# Patient Record
Sex: MALE | Race: WHITE | NOT HISPANIC OR LATINO | Employment: FULL TIME | URBAN - METROPOLITAN AREA
[De-identification: names, ages, dates, MRNs, and addresses within clinical notes are randomized per-mention and may not be internally consistent; named-entity substitution may affect disease eponyms.]

---

## 2017-01-02 DIAGNOSIS — M10.9 PODAGRA: Primary | ICD-10-CM

## 2017-01-02 RX ORDER — ALLOPURINOL 300 MG/1
300 TABLET ORAL DAILY
Qty: 135 TABLET | Refills: 0 | Status: SHIPPED | OUTPATIENT
Start: 2017-01-02 | End: 2017-03-28

## 2017-01-02 NOTE — TELEPHONE ENCOUNTER
Routing refill request to provider for review/approval because:  Labs not current:  Uric, cr, CBC, CMP.  Please sign if ok. Labs are ordered as future.  Please help patient schedule a lab only visit for fasting labs. (TC)  Basia Barroso, ADOLFO  Triage Nurse

## 2017-01-02 NOTE — TELEPHONE ENCOUNTER
ALLOPURINOL 300MG       Last Written Prescription Date: 8/21/2015  Last Fill Quantity: 135, # refills: 3  Last Office Visit with [facility] [place], [facility] or [facility] prescribing provider:  11/7/2016        URIC ACID   Date Value Ref Range Status   12/19/2015 3.9 3.5 - 7.2 mg/dL Final   ]  CREATININE   Date Value Ref Range Status   12/19/2015 0.93 0.66 - 1.25 mg/dL Final   ]  WBC      8.4   7/13/2015  RBC     4.72   7/13/2015  HGB     11.8   6/17/2016  HCT     41.8   7/13/2015  No components found with this name: mct  MCV       89   7/13/2015  MCH     31.6   7/13/2015  MCHC     35.6   7/13/2015  RDW     12.7   7/13/2015  PLT      211   7/13/2015  AST       27   12/19/2015  ALT       67   12/19/2015

## 2017-01-02 NOTE — TELEPHONE ENCOUNTER
rx sent. Due for fasting labs as below. Please let know.    Gali Del Valle MD  Internal Medicine/Pediatrics

## 2017-01-17 DIAGNOSIS — M10.9 PODAGRA: ICD-10-CM

## 2017-01-17 LAB
ALBUMIN SERPL-MCNC: 4.4 G/DL (ref 3.4–5)
ALP SERPL-CCNC: 31 U/L (ref 40–150)
ALT SERPL W P-5'-P-CCNC: 104 U/L (ref 0–70)
ANION GAP SERPL CALCULATED.3IONS-SCNC: 5 MMOL/L (ref 3–14)
AST SERPL W P-5'-P-CCNC: 60 U/L (ref 0–45)
BILIRUB SERPL-MCNC: 0.4 MG/DL (ref 0.2–1.3)
BUN SERPL-MCNC: 17 MG/DL (ref 7–30)
CALCIUM SERPL-MCNC: 9.5 MG/DL (ref 8.5–10.1)
CHLORIDE SERPL-SCNC: 106 MMOL/L (ref 94–109)
CHOLEST SERPL-MCNC: 169 MG/DL
CO2 SERPL-SCNC: 30 MMOL/L (ref 20–32)
CREAT SERPL-MCNC: 1.08 MG/DL (ref 0.66–1.25)
ERYTHROCYTE [DISTWIDTH] IN BLOOD BY AUTOMATED COUNT: 12.6 % (ref 10–15)
GFR SERPL CREATININE-BSD FRML MDRD: 76 ML/MIN/1.7M2
GLUCOSE SERPL-MCNC: 107 MG/DL (ref 70–99)
HCT VFR BLD AUTO: 39.9 % (ref 40–53)
HDLC SERPL-MCNC: 29 MG/DL
HGB BLD-MCNC: 14 G/DL (ref 13.3–17.7)
LDLC SERPL CALC-MCNC: 95 MG/DL
MCH RBC QN AUTO: 31.5 PG (ref 26.5–33)
MCHC RBC AUTO-ENTMCNC: 35.1 G/DL (ref 31.5–36.5)
MCV RBC AUTO: 90 FL (ref 78–100)
NONHDLC SERPL-MCNC: 140 MG/DL
PLATELET # BLD AUTO: 236 10E9/L (ref 150–450)
POTASSIUM SERPL-SCNC: 4.1 MMOL/L (ref 3.4–5.3)
PROT SERPL-MCNC: 8.2 G/DL (ref 6.8–8.8)
RBC # BLD AUTO: 4.45 10E12/L (ref 4.4–5.9)
SODIUM SERPL-SCNC: 141 MMOL/L (ref 133–144)
TRIGL SERPL-MCNC: 225 MG/DL
URATE SERPL-MCNC: 4.7 MG/DL (ref 3.5–7.2)
WBC # BLD AUTO: 7.1 10E9/L (ref 4–11)

## 2017-01-17 PROCEDURE — 80053 COMPREHEN METABOLIC PANEL: CPT | Performed by: INTERNAL MEDICINE

## 2017-01-17 PROCEDURE — 85027 COMPLETE CBC AUTOMATED: CPT | Performed by: INTERNAL MEDICINE

## 2017-01-17 PROCEDURE — 36415 COLL VENOUS BLD VENIPUNCTURE: CPT | Performed by: INTERNAL MEDICINE

## 2017-01-17 PROCEDURE — 80061 LIPID PANEL: CPT | Performed by: INTERNAL MEDICINE

## 2017-01-17 PROCEDURE — 84550 ASSAY OF BLOOD/URIC ACID: CPT | Performed by: INTERNAL MEDICINE

## 2017-02-20 ENCOUNTER — OFFICE VISIT (OUTPATIENT)
Dept: PEDIATRICS | Facility: CLINIC | Age: 37
End: 2017-02-20
Payer: COMMERCIAL

## 2017-02-20 VITALS
HEART RATE: 62 BPM | WEIGHT: 260 LBS | HEIGHT: 72 IN | TEMPERATURE: 97.4 F | BODY MASS INDEX: 35.21 KG/M2 | DIASTOLIC BLOOD PRESSURE: 80 MMHG | OXYGEN SATURATION: 96 % | SYSTOLIC BLOOD PRESSURE: 108 MMHG

## 2017-02-20 DIAGNOSIS — J02.9 ACUTE PHARYNGITIS, UNSPECIFIED ETIOLOGY: Primary | ICD-10-CM

## 2017-02-20 LAB
DEPRECATED S PYO AG THROAT QL EIA: NORMAL
MICRO REPORT STATUS: NORMAL
SPECIMEN SOURCE: NORMAL

## 2017-02-20 PROCEDURE — 87081 CULTURE SCREEN ONLY: CPT | Performed by: INTERNAL MEDICINE

## 2017-02-20 PROCEDURE — 87880 STREP A ASSAY W/OPTIC: CPT | Performed by: INTERNAL MEDICINE

## 2017-02-20 PROCEDURE — 99213 OFFICE O/P EST LOW 20 MIN: CPT | Performed by: INTERNAL MEDICINE

## 2017-02-20 NOTE — MR AVS SNAPSHOT
After Visit Summary   2/20/2017    Denny Herrera    MRN: [id number]           Patient Information     Date Of Birth          1980        Visit Information        Provider Department      2/20/2017 12:20 PM Gali Del Valle MD [facility]        Today's Diagnoses     Acute pharyngitis    -  1      Care Instructions    1. Strep positive  2. Azithromycin 2 pills today, then 1 pill once a day for next 4 days.  3. Change out toothbrush after 48 hours  4. Contagious until on antibiotics for 24 hours        Follow-ups after your visit        Who to contact     If you have questions or need follow up information about today's clinic visit or your schedule please contact [facility] directly at [phone].  Normal or non-critical lab and imaging results will be communicated to you by Zanghart, letter or phone within 4 business days after the clinic has received the results. If you do not hear from us within 7 days, please contact the clinic through [employer] or phone. If you have a critical or abnormal lab result, we will notify you by phone as soon as possible.  Submit refill requests through [employer] or call your pharmacy and they will forward the refill request to us. Please allow 3 business days for your refill to be completed.          Additional Information About Your Visit        MyChart Information     [employer] gives you secure access to your electronic health record. If you see a primary care provider, you can also send messages to your care team and make appointments. If you have questions, please call your primary care clinic.  If you do not have a primary care provider, please call [phone] and they will assist you.        Care EveryWhere ID     This is your Care EveryWhere ID. This could be used by other organizations to access your [place] medical records  [id number]        Your Vitals Were     Pulse Temperature Height Pulse Oximetry BMI (Body Mass Index)        62 97.4  F (36.3  C) (Tympanic) 6' (1.829 m) 96% 35.26 kg/m2        Blood Pressure from Last 3 Encounters:   02/20/17 108/80   11/07/16 143/88   06/17/16 139/85    Weight from Last 3 Encounters:   02/20/17 260 lb (117.9 kg)   11/07/16 263 lb 14.4 oz (119.7 kg)   06/15/16 258 lb 2.5 oz (117.1 kg)              We Performed the Following     Beta strep group A culture     Strep, Rapid Screen        Primary Care Provider Office Phone # Fax #    Gali Del Valle [phone]       [facility] [address] [place] [address] [address] [place] [address]        Thank you!     Thank you for choosing [facility]  for your care. Our goal is always to provide you with excellent care. Hearing back from our patients is one way we can continue to improve our services. Please take a few minutes to complete the written survey that you may receive in the mail after your visit with us. Thank you!             Your Updated Medication List - Protect others around you: Learn how to safely use, store and throw away your medicines at www.disposemymeds.org.          This list is accurate as of: 2/20/17  1:30 PM.  Always use your most recent med list.                   Brand Name Dispense Instructions for use    albuterol 108 (90 BASE) MCG/ACT Inhaler    PROAIR HFA/PROVENTIL HFA/VENTOLIN HFA    1 Inhaler    Inhale 2 puffs into the lungs every 4 hours as needed for shortness of breath / dyspnea or wheezing       allopurinol 300 MG tablet    ZYLOPRIM    135 tablet    Take 1 tablet (300 mg) by mouth daily And 1/2 tablet (150 mg) by mouth in evening       fenofibrate 160 MG tablet     90 tablet    Take 1 tablet (160 mg) by mouth daily       MIRALAX powder   Generic drug:  polyethylene glycol     510 g    Take 17 g (1 capful) by mouth daily       Multi-vitamin Tabs tablet      Take 1 tablet by mouth daily       OMEGA-3 FISH OIL PO      Take 1 capsule by mouth daily       VITAMIN D (CHOLECALCIFEROL) PO      Take by mouth  daily

## 2017-02-20 NOTE — NURSING NOTE
Chief Complaint   Patient presents with     URI       Initial /80 (BP Location: Right arm, Patient Position: Chair, Cuff Size: Adult Large)  Pulse 62  Temp 97.4  F (36.3  C) (Tympanic)  Ht 6' (1.829 m)  Wt 260 lb (117.9 kg)  SpO2 96%  BMI 35.26 kg/m2 Estimated body mass index is 35.26 kg/(m^2) as calculated from the following:    Height as of this encounter: 6' (1.829 m).    Weight as of this encounter: 260 lb (117.9 kg).  Medication Reconciliation: complete   Marge Osullivan LPN

## 2017-02-20 NOTE — PROGRESS NOTES
SUBJECTIVE:                                                    Denny Herrera is a 36 year old male who presents to clinic today for the following health issues:      RESPIRATORY SYMPTOMS      Duration: 1 day    Description  sore throat    Severity: moderate    Accompanying signs and symptoms: None    History (predisposing factors):  strep exposure    Precipitating or alleviating factors: None    Therapies tried and outcome:  None    Pt here with son who was diagnosed with strep. Patient developed scratchy throat this am, a little sore. Feels like getting sick. No cough or fevers.      -------------------------------------    Problem list and histories reviewed & adjusted, as indicated.  Additional history: as documented    ROS:  Constitutional, HEENT, cardiovascular, pulmonary, gi and gu systems are negative, except as otherwise noted.    Problem list, Medication list, Allergies, and Medical/Social/Surgical histories reviewed in EPIC and updated as appropriate.    OBJECTIVE:                                                    /80 (BP Location: Right arm, Patient Position: Chair, Cuff Size: Adult Large)  Pulse 62  Temp 97.4  F (36.3  C) (Tympanic)  Ht 6' (1.829 m)  Wt 260 lb (117.9 kg)  SpO2 96%  BMI 35.26 kg/m2   Body mass index is 35.26 kg/(m^2).  General Appearance: healthy, alert and no distress  Eyes:   no discharge, erythema.  Normal pupils.  Nose: no discharge and normal mucosa  Oropharynx: mild erythema of posterior oropharnyx  Respiratory: lungs clear to auscultation - no rales, rhonchi or wheezes.  Cardiovascular: regular rate and rhythm, normal S1 S2, no S3 or S4 and no murmur, click or rub.  No peripheral edema.  Skin: no rashes or lesions.  Well perfused and normal turgor.    Diagnostic Test Results:  Results for orders placed or performed in visit on 02/20/17   Strep, Rapid Screen   Result Value Ref Range    Specimen Description Throat     Rapid Strep A Screen       NEGATIVE: No Group A  streptococcal antigen detected by immunoassay, await   culture report.      Micro Report Status FINAL 02/20/2017    Beta strep group A culture   Result Value Ref Range    Specimen Description Throat     Culture Micro No Beta Streptococcus isolated     Micro Report Status FINAL 02/22/2017         ASSESSMENT/PLAN:                                                      (J02.9) Acute pharyngitis  (primary encounter diagnosis)  Comment: strep negative.   Plan: Strep, Rapid Screen, Beta strep group A         Culture  - may have been too early to detect  - will call if continues to have sore throat and develops fevers. Would tx over the phone with PCN given known exposure    Follow up with Provider - annual visit and prn     [facility] MANNY

## 2017-02-22 LAB
BACTERIA SPEC CULT: NORMAL
MICRO REPORT STATUS: NORMAL
SPECIMEN SOURCE: NORMAL

## 2017-03-28 ENCOUNTER — MYC MEDICAL ADVICE (OUTPATIENT)
Dept: PEDIATRICS | Facility: CLINIC | Age: 37
End: 2017-03-28

## 2017-03-28 DIAGNOSIS — E78.1 HYPERTRIGLYCERIDEMIA: ICD-10-CM

## 2017-03-28 DIAGNOSIS — Z30.09 VISIT FOR VASECTOMY EVALUATION: Primary | ICD-10-CM

## 2017-03-28 DIAGNOSIS — M10.9 PODAGRA: ICD-10-CM

## 2017-03-28 RX ORDER — ALLOPURINOL 300 MG/1
300 TABLET ORAL DAILY
Qty: 135 TABLET | Refills: 2 | Status: SHIPPED | OUTPATIENT
Start: 2017-03-28 | End: 2018-04-22

## 2017-03-28 RX ORDER — FENOFIBRATE 160 MG/1
160 TABLET ORAL DAILY
Qty: 90 TABLET | Refills: 2 | Status: SHIPPED | OUTPATIENT
Start: 2017-03-28 | End: 2018-04-22

## 2017-03-28 NOTE — TELEPHONE ENCOUNTER
Fenofibrate 160 mg       Last Written Prescription Date: 02/29/16  Last Fill Quantity: 90, # refills: 1    Last Office Visit with [facility] [place], [facility] or [facility] prescribing provider:  02/20/17-for acute visit   Future Office Visit:      Cholesterol   Date Value Ref Range Status   01/17/2017 169 <200 mg/dL Final     HDL Cholesterol   Date Value Ref Range Status   01/17/2017 29 (L) >39 mg/dL Final     LDL Cholesterol Calculated   Date Value Ref Range Status   01/17/2017 95 <100 mg/dL Final     Comment:     Desirable:       <100 mg/dl     Triglycerides   Date Value Ref Range Status   01/17/2017 225 (H) <150 mg/dL Final     Comment:     Borderline high:  150-199 mg/dl   High:             200-499 mg/dl   Very high:       >499 mg/dl       Cholesterol/HDL Ratio   Date Value Ref Range Status   06/13/2014 6.1 (H) 0.0 - 5.0 Final     ALT   Date Value Ref Range Status   01/17/2017 104 (H) 0 - 70 U/L Final      Routing refill request to provider for review/approval because:  Labs out of range:  ALT elevated.    2. Allopurinol  300 mg      Last Written Prescription Date: 01/02/17  Last Fill Quantity: 135, # refills: 0  Last Office Visit with [facility] [place] [facility], [facility] or [facility] prescribing provider:  02/20/17        Uric Acid   Date Value Ref Range Status   01/17/2017 4.7 3.5 - 7.2 mg/dL Final   ]  Creatinine   Date Value Ref Range Status   01/17/2017 1.08 0.66 - 1.25 mg/dL Final   ]  Lab Results   Component Value Date    WBC 7.1 01/17/2017     Lab Results   Component Value Date    RBC 4.45 01/17/2017     Lab Results   Component Value Date    HGB 14.0 01/17/2017     Lab Results   Component Value Date    HCT 39.9 01/17/2017     No components found for: MCT  Lab Results   Component Value Date    MCV 90 01/17/2017     Lab Results   Component Value Date    MCH 31.5 01/17/2017     Lab Results   Component Value Date    MCHC 35.1 01/17/2017     Lab Results   Component Value Date    RDW 12.6 01/17/2017     Lab Results   Component Value Date     01/17/2017      Lab Results   Component Value Date    AST 60 01/17/2017     Lab Results   Component Value Date     01/17/2017     Routing refill request to provider for review/approval because:  Labs out of range:  Elevated ALT/AST  ABDON Low RN

## 2017-05-08 ENCOUNTER — OFFICE VISIT (OUTPATIENT)
Dept: UROLOGY | Facility: CLINIC | Age: 37
End: 2017-05-08
Payer: COMMERCIAL

## 2017-05-08 VITALS — WEIGHT: 260 LBS | HEIGHT: 72 IN | HEART RATE: 72 BPM | OXYGEN SATURATION: 95 % | BODY MASS INDEX: 35.21 KG/M2

## 2017-05-08 DIAGNOSIS — Z30.2 ENCOUNTER FOR STERILIZATION: Primary | ICD-10-CM

## 2017-05-08 PROCEDURE — 99203 OFFICE O/P NEW LOW 30 MIN: CPT | Performed by: UROLOGY

## 2017-05-08 ASSESSMENT — PAIN SCALES - GENERAL: PAINLEVEL: MILD PAIN (2)

## 2017-05-08 NOTE — MR AVS SNAPSHOT
After Visit Summary   5/8/2017    Denny Herrera    MRN: [id number]           Patient Information     Date Of Birth          1980        Visit Information        Provider Department      5/8/2017 3:00 PM Alden Albarran MD [facility] [place]        Today's Diagnoses     Encounter for sterilization    -  1      Care Instructions    [facility]  Vasectomy Information  [phone]    DATE OF PROCEDURE ___________________________________    TIME OF PROCEDURE ___________________________    TIME TO REPORT FOR PROCEDURE _______________________________    Your Physician:  _____ Olivier Kaplan M.D.     _____ French Garcia M.D.     _____ Alden Albarran M.D.    LOCATION OF PROCEDURE:     _____ [facility]  _____ [address]   [address] [facility]  [address]    [place]   [address]    [place]   [address]    Preoperative Instructions:    _____ You may have breakfast on the morning of your procedure.  If your procedure is    in the afternoon, you may have lunch as well.    _____ You must have someone drive you home after the procedure if you have been    prescribed an oral sedative (valium).    _____ Do not take any aspirin, blood-thinning or anti-inflammatory medication for at    least 7-10 days before the procedure (this includes but is not limited to Advil,   Aleve, Ecotrin and Bufferin).      Postoperative Instructions Follow Vasectomy    Under routine circumstances, please note the following:    -No heavy lifting (over 15 lbs) for 48 hour.  -You may shower after 48 hours.  You may have a tub bath or use a swimming pool after one week postoperatively.  Your doctor will advise you if he feels it is helpful to soak in a bathtub postoperatively.  -Do not engage in intercourse for at least ten days and then proceed when comfortable.  -Wear an athletic supporter for 48 hours postoperatively or  "until any discomfort ceases.  -Your physician will instruct you regarding the use of ice in the recovery room or at home after the procedure, as necessary.  -Please remember as you resume your activity that you may experience some discomfor and/or swelling for the one to two weeks following the procedure.  If this occurs decrease activity and slightly elevate the scrotum (athletic supporter).  -As your stitches dissolve it may appear that the incision is \"gaping.\"  This is normal.    Necessary follow-up:  You do not need a follow up appointment unless you have problems after your procedure.  Please call our office at [phone] if you do.    At the time of your procedure you will be given the supplies for your post procedure follow up.  The test should be done AT LEAST THREE MONTHS AFTER your vasecomy.  During this time, be certain to maintain birth control measure!    Between the time of your procedure and the time that you have your first sperm count it is very important that you have a minimum of 30 ejaculations.  If you have any questions about this, please consult your physician.    If the sperm count that is done at three months is negative, you will be considered sterile.  Until, you are told that you are free to discontinue birth control you are considered fertile.    If your sperm counts reveal the presence of sperm, you will be advised to repeat the test until a negative result is obtained.     NOTE:  Please call our office one week after dropping off your sample for the result.  Test results will only be given to the patient.               Follow-ups after your visit        Follow-up notes from your care team     Return for Schedule vasectomy in office.      Your next 10 appointments already scheduled     Jun 02, 2017 10:30 AM CDT   Vasectomy with Alden Albarran MD, U[facility] VAS ROOM   [facility] ([facility])    [address]  [address]  [facility] [id number] [phone]              Who to contact     If you have questions or need follow up information about today's clinic visit or your schedule please contact [facility] [place] directly at [phone].  Normal or non-critical lab and imaging results will be communicated to you by MyChart, letter or phone within 4 business days after the clinic has received the results. If you do not hear from us within 7 days, please contact the clinic through MyChart or phone. If you have a critical or abnormal lab result, we will notify you by phone as soon as possible.  Submit refill requests through [employer] or call your pharmacy and they will forward the refill request to us. Please allow 3 business days for your refill to be completed.          Additional Information About Your Visit        MyChart Information     [employer] gives you secure access to your electronic health record. If you see a primary care provider, you can also send messages to your care team and make appointments. If you have questions, please call your primary care clinic.  If you do not have a primary care provider, please call [phone] and they will assist you.        Care EveryWhere ID     This is your Care EveryWhere ID. This could be used by other organizations to access your [place] medical records  [id number]        Your Vitals Were     Pulse Height Pulse Oximetry BMI (Body Mass Index)          72 1.829 m (6') 95% 35.26 kg/m2         Blood Pressure from Last 3 Encounters:   02/20/17 108/80   11/07/16 143/88   06/17/16 139/85    Weight from Last 3 Encounters:   05/08/17 117.9 kg (260 lb)   02/20/17 117.9 kg (260 lb)   11/07/16 119.7 kg (263 lb 14.4 oz)              Today, you had the following     No orders found for display       Primary Care Provider Office Phone # Fax #    Gali Del Valle [phone]       [address]  [facility] [address] [place] [address]        Thank you!     Thank you for choosing [facility] [place]  for your care. Our goal is always to provide you with excellent care. Hearing back from our patients is one way we can continue to improve our services. Please take a few minutes to complete the written survey that you may receive in the mail after your visit with us. Thank you!             Your Updated Medication List - Protect others around you: Learn how to safely use, store and throw away your medicines at www.disposemymeds.org.          This list is accurate as of: 5/8/17  3:11 PM.  Always use your most recent med list.                   Brand Name Dispense Instructions for use    albuterol 108 (90 BASE) MCG/ACT Inhaler    PROAIR HFA/PROVENTIL HFA/VENTOLIN HFA    1 Inhaler    Inhale 2 puffs into the lungs every 4 hours as needed for shortness of breath / dyspnea or wheezing       allopurinol 300 MG tablet    ZYLOPRIM    135 tablet    Take 1 tablet (300 mg) by mouth daily And 1/2 tablet (150 mg) by mouth in evening       fenofibrate 160 MG tablet     90 tablet    Take 1 tablet (160 mg) by mouth daily       MIRALAX powder   Generic drug:  polyethylene glycol     510 g    Take 17 g (1 capful) by mouth daily       Multi-vitamin Tabs tablet      Take 1 tablet by mouth daily       OMEGA-3 FISH OIL PO      Take 1 capsule by mouth daily       VITAMIN D (CHOLECALCIFEROL) PO      Take by mouth daily

## 2017-05-08 NOTE — NURSING NOTE
Chief Complaint   Patient presents with     Other     vas consult     ABDON Parker CMA  Pt last saw blood in semen back in 1999.

## 2017-05-08 NOTE — LETTER
5/8/2017       RE: Denny Herrera  [id number] [facility] [id number]     Dear Colleague,    Thank you for referring your patient, Denny Herrera, to the [facility]OLOG[facility] [place] [facility]. Please see a copy of my visit note below.    VASECTOMY CONSULTATION NOTE  DATE OF VISIT: 5/8/2017    PATIENT NAME: Denny Herrera    YOB: 1980      REASON FOR CONSULTATION: Mr. Denny Herrera is a 36 year old year old gentleman who came to the urology clinic today requesting a vasectomy. He has 3 children and he wishes to have a vasectomy for birth control.     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 2 for R knee, 3 L knee, ACL and meniscus arthroscopic procedures x 3, 2 open surgery     COLONOSCOPY N/A 8/19/2015    Procedure: COLONOSCOPY;  Surgeon: Timbo Greenberg MD;  Location:  GI     ORTHOPEDIC SURGERY       OSTEOTOMY TIBIA Right 6/15/2016    Procedure: OSTEOTOMY TIBIA;  Surgeon: Tim Valdez MD;  [facility]: [facility] OR     REMOVE  HARDWARE KNEE Right 6/15/2016    Procedure: REMOVE HARDWARE KNEE;  Surgeon: Tim Valdez MD;  [facility] OR       MEDICATIONS:   Current Outpatient Prescriptions:      allopurinol (ZYLOPRIM) 300 MG tablet, Take 1 tablet (300 mg) by mouth daily And 1/2 tablet (150 mg) by mouth in evening, Disp: 135 tablet, Rfl: 2     fenofibrate 160 MG tablet, Take 1 tablet (160 mg) by mouth daily, Disp: 90 tablet, Rfl: 2     polyethylene glycol (MIRALAX) powder, Take 17 g (1 capful) by mouth daily, Disp: 510 g, Rfl: 1     Omega-3 Fatty Acids (OMEGA-3 FISH OIL PO), Take 1 capsule by mouth daily, Disp: , Rfl:      multivitamin, therapeutic with minerals (MULTI-VITAMIN) TABS, Take 1 tablet by mouth daily, Disp: , Rfl:      VITAMIN D, CHOLECALCIFEROL, PO, Take by mouth daily, Disp: , Rfl:      albuterol (PROAIR HFA, PROVENTIL HFA, VENTOLIN HFA) 108 (90 BASE) MCG/ACT inhaler, Inhale 2 puffs into the lungs every 4 hours as needed for shortness of breath / dyspnea or wheezing (Patient not taking: Reported on 5/8/2017), Disp: 1 Inhaler, Rfl: 1    ALLERGIES:   Allergies   Allergen Reactions     No Known Drug Allergies        FAMILY HISTORY:   Family History   Problem Relation Age of Onset     Family History Negative Other      neg for RA, IBD, psoriasis, does not know much about his father side of his family, neg for gout     Arthritis Mother      back DJD     Alcohol/Drug Mother      Depression Mother      Psychotic Disorder Mother      Endocrine Disease Mother      Unknown/Adopted Mother      back issues     Arthritis Brother      back DJD     Arthritis Maternal Grandmother      back DJD     Circulatory Maternal Grandmother      Eye Disorder Maternal Grandmother      CANCER Maternal Aunt      ?type     Hypertension Brother      Circulatory Maternal Grandfather      Eye Disorder Maternal Grandfather      Hypertension Other      maternal grandparents     Alcohol/Drug Brother      Unknown/Adopted Brother      back issues      "Unknown/Adopted Other      maternal grandparents - back issues       SOCIAL HISTORY:   Social History     Social History     Marital status:      Spouse name: N/A     Number of children: 1     Years of education: N/A     Occupational History     IT work [employer]     Social History Main Topics     Smoking status: Never Smoker     Smokeless tobacco: Never Used     Alcohol use 0.0 - 1.0 oz/week     0 - 2 Standard drinks or equivalent per week      Comment: rarely     Drug use: No     Sexual activity: Yes     Partners: Female     Birth control/ protection: Pill     Other Topics Concern     Not on file     Social History Narrative       HEIGHT: 6' 0\"     WEIGHT: 260 lbs 0 oz   BP: Data Unavailable    PULSE: 72    EXAM: He is alert and oriented and well-appearing.  Examination of the scrotum reveals normal scrotal skin.  The testicles are normal to palpation bilaterally with no intratesticular lesions.  He has normally palpable vasa bilaterally.    DIAGNOSIS: Request for sterilization    PLAN: The risks of the procedure as well as expectations for recovery and outcomes were splint in detail to him.  He was counseled on the risks for bleeding infection and pain after the procedure.  He was instructed to continue to use contraception until he had proven azoospermia on a semen specimen.  This would normally be collected at least 3 months after the procedure.  He was instructed to hold all anticoagulants medications for one week prior to the procedure.  He was also instructed to shave the scrotum prior to procedure.  It was recommended that he have someone else drive him home after his vasectomy.  In light of these risks and expectations he would like to proceed.  We are scheduling a vasectomy in the office in the near future.    Alden Albarran M.D.        "

## 2017-05-08 NOTE — PROGRESS NOTES
VASECTOMY CONSULTATION NOTE  DATE OF VISIT: 5/8/2017    PATIENT NAME: Denny Herrera    YOB: 1980      REASON FOR CONSULTATION: Mr. Denny Herrera is a 36 year old year old gentleman who came to the urology clinic today requesting a vasectomy. He has 3 children and he wishes to have a vasectomy for birth control.     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 2 for R knee, 3 L knee, ACL and meniscus arthroscopic procedures x 3, 2 open surgery     COLONOSCOPY N/A 8/19/2015    Procedure: COLONOSCOPY;  Surgeon: Timbo Greenberg MD;  Location:  GI     ORTHOPEDIC SURGERY       OSTEOTOMY TIBIA Right 6/15/2016    Procedure: OSTEOTOMY TIBIA;  Surgeon: Tim Valdez MD;  [facility] OR     REMOVE HARDWARE KNEE Right 6/15/2016    Procedure: REMOVE HARDWARE KNEE;  Surgeon: Tim Valdez MD;  [facility]       MEDICATIONS:   Current Outpatient Prescriptions:      allopurinol (ZYLOPRIM) 300 MG tablet, Take 1 tablet (300 mg) by mouth daily And 1/2 tablet (150 mg) by mouth in evening, Disp: 135  tablet, Rfl: 2     fenofibrate 160 MG tablet, Take 1 tablet (160 mg) by mouth daily, Disp: 90 tablet, Rfl: 2     polyethylene glycol (MIRALAX) powder, Take 17 g (1 capful) by mouth daily, Disp: 510 g, Rfl: 1     Omega-3 Fatty Acids (OMEGA-3 FISH OIL PO), Take 1 capsule by mouth daily, Disp: , Rfl:      multivitamin, therapeutic with minerals (MULTI-VITAMIN) TABS, Take 1 tablet by mouth daily, Disp: , Rfl:      VITAMIN D, CHOLECALCIFEROL, PO, Take by mouth daily, Disp: , Rfl:      albuterol (PROAIR HFA, PROVENTIL HFA, VENTOLIN HFA) 108 (90 BASE) MCG/ACT inhaler, Inhale 2 puffs into the lungs every 4 hours as needed for shortness of breath / dyspnea or wheezing (Patient not taking: Reported on 5/8/2017), Disp: 1 Inhaler, Rfl: 1    ALLERGIES:   Allergies   Allergen Reactions     No Known Drug Allergies        FAMILY HISTORY:   Family History   Problem Relation Age of Onset     Family History Negative Other      neg for RA, IBD, psoriasis, does not know much about his father side of his family, neg for gout     Arthritis Mother      back DJD     Alcohol/Drug Mother      Depression Mother      Psychotic Disorder Mother      Endocrine Disease Mother      Unknown/Adopted Mother      back issues     Arthritis Brother      back DJD     Arthritis Maternal Grandmother      back DJD     Circulatory Maternal Grandmother      Eye Disorder Maternal Grandmother      CANCER Maternal Aunt      ?type     Hypertension Brother      Circulatory Maternal Grandfather      Eye Disorder Maternal Grandfather      Hypertension Other      maternal grandparents     Alcohol/Drug Brother      Unknown/Adopted Brother      back issues     Unknown/Adopted Other      maternal grandparents - back issues       SOCIAL HISTORY:   Social History     Social History     Marital status:      Spouse name: N/A     Number of children: 1     Years of education: N/A     Occupational History     IT work [employer]     Social History Main Topics      "Smoking status: Never Smoker     Smokeless tobacco: Never Used     Alcohol use 0.0 - 1.0 oz/week     0 - 2 Standard drinks or equivalent per week      Comment: rarely     Drug use: No     Sexual activity: Yes     Partners: Female     Birth control/ protection: Pill     Other Topics Concern     Not on file     Social History Narrative       HEIGHT: 6' 0\"     WEIGHT: 260 lbs 0 oz   BP: Data Unavailable    PULSE: 72    EXAM: He is alert and oriented and well-appearing.  Examination of the scrotum reveals normal scrotal skin.  The testicles are normal to palpation bilaterally with no intratesticular lesions.  He has normally palpable vasa bilaterally.    DIAGNOSIS: Request for sterilization    PLAN: The risks of the procedure as well as expectations for recovery and outcomes were splint in detail to him.  He was counseled on the risks for bleeding infection and pain after the procedure.  He was instructed to continue to use contraception until he had proven azoospermia on a semen specimen.  This would normally be collected at least 3 months after the procedure.  He was instructed to hold all anticoagulants medications for one week prior to the procedure.  He was also instructed to shave the scrotum prior to procedure.  It was recommended that he have someone else drive him home after his vasectomy.  In light of these risks and expectations he would like to proceed.  We are scheduling a vasectomy in the office in the near future.    Alden Albarran M.D.    "

## 2017-05-08 NOTE — PATIENT INSTRUCTIONS
[facility] UROLOGY  Vasectomy Information  [phone]    DATE OF PROCEDURE ___________________________________    TIME OF PROCEDURE ___________________________    TIME TO REPORT FOR PROCEDURE _______________________________    Your Physician:  _____ Olivier Kaplan M.D.     _____ French Garcia M.D.     _____ Alden Albarran M.D.    LOCATION OF PROCEDURE:     _____ [facility]  _____ [address]   [address]   [address] [facility]  [address]    [place]   [address]    [place]   [address]    Preoperative Instructions:    _____ You may have breakfast on the morning of your procedure.  If your procedure is    in the afternoon, you may have lunch as well.    _____ You must have someone drive you home after the procedure if you have been    prescribed an oral sedative (valium).    _____ Do not take any aspirin, blood-thinning or anti-inflammatory medication for at    least 7-10 days before the procedure (this includes but is not limited to Advil,   Aleve, Ecotrin and Bufferin).      Postoperative Instructions Follow Vasectomy    Under routine circumstances, please note the following:    -No heavy lifting (over 15 lbs) for 48 hour.  -You may shower after 48 hours.  You may have a tub bath or use a swimming pool after one week postoperatively.  Your doctor will advise you if he feels it is helpful to soak in a bathtub postoperatively.  -Do not engage in intercourse for at least ten days and then proceed when comfortable.  -Wear an athletic supporter for 48 hours postoperatively or until any discomfort ceases.  -Your physician will instruct you regarding the use of ice in the recovery room or at home after the procedure, as necessary.  -Please remember as you resume your activity that you may experience some discomfor and/or swelling for the one to two weeks following the procedure.  If this occurs decrease activity and slightly elevate the scrotum (athletic  "supporter).  -As your stitches dissolve it may appear that the incision is \"gaping.\"  This is normal.    Necessary follow-up:  You do not need a follow up appointment unless you have problems after your procedure.  Please call our office at [phone] if you do.    At the time of your procedure you will be given the supplies for your post procedure follow up.  The test should be done AT LEAST THREE MONTHS AFTER your vasecomy.  During this time, be certain to maintain birth control measure!    Between the time of your procedure and the time that you have your first sperm count it is very important that you have a minimum of 30 ejaculations.  If you have any questions about this, please consult your physician.    If the sperm count that is done at three months is negative, you will be considered sterile.  Until, you are told that you are free to discontinue birth control you are considered fertile.    If your sperm counts reveal the presence of sperm, you will be advised to repeat the test until a negative result is obtained.     NOTE:  Please call our office one week after dropping off your sample for the result.  Test results will only be given to the patient.         "

## 2017-05-30 DIAGNOSIS — Z30.2 ENCOUNTER FOR STERILIZATION: Primary | ICD-10-CM

## 2017-06-02 ENCOUNTER — OFFICE VISIT (OUTPATIENT)
Dept: UROLOGY | Facility: CLINIC | Age: 37
End: 2017-06-02
Payer: COMMERCIAL

## 2017-06-02 VITALS — HEART RATE: 68 BPM | OXYGEN SATURATION: 97 %

## 2017-06-02 DIAGNOSIS — Z30.2 ENCOUNTER FOR STERILIZATION: Primary | ICD-10-CM

## 2017-06-02 PROCEDURE — 88302 TISSUE EXAM BY PATHOLOGIST: CPT | Performed by: UROLOGY

## 2017-06-02 PROCEDURE — 55250 REMOVAL OF SPERM DUCT(S): CPT | Performed by: UROLOGY

## 2017-06-02 RX ORDER — HYDROCODONE BITARTRATE AND ACETAMINOPHEN 5; 325 MG/1; MG/1
1 TABLET ORAL EVERY 4 HOURS PRN
Qty: 10 TABLET | Refills: 0 | Status: SHIPPED | OUTPATIENT
Start: 2017-06-02 | End: 2017-07-27

## 2017-06-02 ASSESSMENT — PAIN SCALES - GENERAL
PAINLEVEL: NO PAIN (0)
PAINLEVEL: NO PAIN (0)

## 2017-06-02 NOTE — NURSING NOTE
Prior to the start of the procedure and with procedural staff participation, I verbally confirmed the patient s identity using two indicators, relevant allergies, that the procedure was appropriate and matched the consent or emergent situation, and that the correct equipment/implants were available. Immediately prior to starting the procedure I conducted the Time Out with the procedural staff and re-confirmed the patient s name, procedure, and site/side. (The Joint Commission universal protocol was followed.)  Yes    Sedation (Moderate or Deep): None  Pt has signed the consent form today confirming that a VASECTOMY is the correct procedure today. I verbally confirmed the patient s identity using two indicators, that the pt has started any medication as prescribed for this procedure, relevant allergies, that they have not used blood thinning products in the last 7 - 10 days, and that the correct equipment was available. Immediately prior to starting the procedure I conducted the Time Out with the MD and re-confirmed the patient s name and procedure. Pathology ordered and sent to lab. Post-procedure information, also specimen collection cup with instructions, given to pt at time of check out.    ABDON Parker CMA

## 2017-06-02 NOTE — NURSING NOTE
The following medication was given:     MEDICATION:  8.4% sodium bicarbonate  ROUTE: inject  SITE: scrotum  DOSE: 50 mEq  LOT #: -ev  : Libby  EXPIRATION DATE: 1aug2017  NDC#: 6955-5919-29   Was there drug waste? Yes  Amount of drug waste (mL): 45.  Reason for waste:  Single use vial      Matt Parker CMA  June 2, 2017

## 2017-06-02 NOTE — PATIENT INSTRUCTIONS
POST VASECTOMY INSTRUCTIONS    1.) If you have any concerns or questions, please contact our office at [phone].     2.) It is okay to take a shower, however, do not soak in water (bath,swimming, hot tub,etc....) until your incision is healed.    3.) You might notice some swelling, mild bruising, and discomfort for several days after your vasectomy. This is to be expected. For at least the next 24 hours, an ice pack should be applied for 20 minutes every hour that you are awake. Ice will help with discomfort and swelling. Do not place directly on the skin.    4.) No intercourse, strenuous activity or exercise for at least 7-10 days, even if you feel fine.    5.) You need to wear good scrotal support while you are healing. We strongly recommend an athletic supporter or a pair of regular briefs that are one size too small. Boxer briefs do not offer enough support.    6.) Tylenol as directed on the bottle is preferred for discomfort. Please avoid any blood thinning products such as ibuprofen and aspirin (Motrin, Advil, Excedrin, Aleve, ect..) for at least the next week.    7.) It is normal to have mild drainage from the incision area for several days. However, please contact our office if you notice: bright red blood that does not stop after three days, increased pain, heat at the incision, red streaks, foul smelling discharge, or if you start to run a fever.     8.) YOU MUST CONTINUE BIRTH CONTROL UNTIL WE CONFIRM YOUR STERILITY.  This process can take up to a year to complete (rare occurrence).     9.) You have been given a form with specimen cup and instructions for your follow up specimen. You will be cleared once we receive ONE negative specimen. If your specimen comes back positive (sperm seen) you will be asked to repeat the test. This does not mean that your vasectomy has failed.

## 2017-06-02 NOTE — PROGRESS NOTES
OFFICE VASECTOMY OPERATIVE NOTE    DATE: 06/02/17  PATIENT: Denny Herrera    YOB: 1980    Denny Herrera is a 36 year old male.  He has 3 children and he wishes a vasectomy for birth control.  He has read the brochure and he has shaved himself.  I reviewed the vasectomy procedure with him explaining that it would be done with a local anesthetic given just in the location where the vasectomy would be done.  It would be done through scalpel-less incisions with the removal of segments of the vasa, cauterization of the ends, and burying the ends separate with sutures.      Pt. Understands:  1/1000-1/3000 risk of future pregnancy even with perfectly done vasectomy  -vasectomy is a permanent procedure    -he may cryopreserve sperm if he wishes   -1-5% risk of post-vasectomy pain syndrome   -1-5% risk of complication, primarily infection or bleeding  - he needs to have a semen sample that shows no sperm before getting approval for unprotected intercourse.      Complications such as bleeding, infection, and damage to other tissues in the area were discussed.  I recommended that an ice bag be placed on the scrotum off and on tonight to help reduce pain and swelling.      He was reminded that he was not sterile immediately after the vasectomy that it would take at least 20 ejaculations to empty the vas of any remaining sperm.  He was not to provide a semen sample until after the 20th ejaculation and not before 12 weeks after the vas. He was  to fulfill both of those requirements.   He understands it is his responsibility to find out the results of the vas before proceeding with intercourse without birth control protection.  Other items discussed were activity afterwards, returning to work, voluntary physical activity,  resuming sexual activity, clothing to wear, bathing, and care of the vas site and expected changes in the site as healing progresses.  After signing the permit, bilateral vasectomy was done as  described through scalpel-less incisions.       ANESTHESIA: Local    DETAILS OF PROCEDURE: The risks of the procedure were explained in detail to the patient and informed consent was obtained. The patient was placed supine on the procedure table and the penis and scrotum were prepped and draped in the standard sterile fashion. The right vas deferens was isolated and brought up to the median raphe of the scrotum. 1% lidocaine local anesthesia was used to infiltrate the skin and the spermatic cord. A sharp hemostat was used to make a skin puncture. Adventitial tissues were swept away from the vas. A 1 cm segment of the vas was excised and sent for pathology. The proximal and distal lumina of the vas were cauterized and then each segment was tied off in a knuckling-fashion with a 3-0 chromic suture. Hemostasis was ensured and the segments were released back into the scrotum. Next the left vas was brought up to the same incision and a vasectomy was performed in the similar fashion. At the end of the procedure a single 3-0 chromic suture was placed in the skin.     COMPLICATIONS: None    TAKE HOME MEDICATIONS: Vicodin, 1-2 tabs every 6 hours, PRN    DISMISSAL INSTRUCTIONS:  - Ice pack to scrotum 15 to 20 minutes each hour awake for 36 to 40 hours.  - No strenuous activity or ejaculation for 14 days.  - No unprotected sexual activity until proven azoospermia on semen samples at 3 months.  - Referred to patient handout for normal postop expectations and indications to contact nurse or physician.    M.D.: Alden Albarran M.D.

## 2017-06-02 NOTE — LETTER
6/2/2017       RE: Denny Herrera  [address] [facility] [address]     Dear Colleague,    Thank you for referring your patient, Denny Herrera, to the [facility] [place] at [facility]. Please see a copy of my visit note below.    OFFICE VASECTOMY OPERATIVE NOTE    DATE: 06/02/17  PATIENT: Denny Herrera    YOB: 1980    Denny Herrera is a 36 year old male.  He has 3 children and he wishes a vasectomy for birth control.  He has read the brochure and he has shaved himself.  I reviewed the vasectomy procedure with him explaining that it would be done with a local anesthetic given just in the location where the vasectomy would be done.  It would be done through scalpel-less incisions with the removal of segments of the vasa, cauterization of the ends, and burying the ends separate with sutures.      Pt. Understands:  1/1000-1/3000 risk of future pregnancy even with perfectly done vasectomy  -vasectomy is a permanent procedure    -he may cryopreserve sperm if he wishes   -1-5% risk of post-vasectomy pain syndrome   -1-5% risk of complication, primarily infection or bleeding  - he needs to have a semen sample that shows no sperm before getting approval for unprotected intercourse.      Complications such as bleeding, infection, and damage to other tissues in the area were discussed.  I recommended that an ice bag be placed on the scrotum off and on tonight to help reduce pain and swelling.      He was reminded that he was not sterile immediately after the vasectomy that it would take at least 20 ejaculations to empty the vas of any remaining sperm.  He was not to provide a semen sample until after the 20th ejaculation and not before 12 weeks after the vas. He was  to fulfill both of those requirements.   He understands it is his responsibility to find out the results of the vas before proceeding with intercourse without birth control  protection.  Other items discussed were activity afterwards, returning to work, voluntary physical activity,  resuming sexual activity, clothing to wear, bathing, and care of the vas site and expected changes in the site as healing progresses.  After signing the permit, bilateral vasectomy was done as described through scalpel-less incisions.     ANESTHESIA: Local    DETAILS OF PROCEDURE: The risks of the procedure were explained in detail to the patient and informed consent was obtained. The patient was placed supine on the procedure table and the penis and scrotum were prepped and draped in the standard sterile fashion. The right vas deferens was isolated and brought up to the median raphe of the scrotum. 1% lidocaine local anesthesia was used to infiltrate the skin and the spermatic cord. A sharp hemostat was used to make a skin puncture. Adventitial tissues were swept away from the vas. A 1 cm segment of the vas was excised and sent for pathology. The proximal and distal lumina of the vas were cauterized and then each segment was tied off in a knuckling-fashion with a 3-0 chromic suture. Hemostasis was ensured and the segments were released back into the scrotum. Next the left vas was brought up to the same incision and a vasectomy was performed in the similar fashion. At the end of the procedure a single 3-0 chromic suture was placed in the skin.     COMPLICATIONS: None    TAKE HOME MEDICATIONS: Vicodin, 1-2 tabs every 6 hours, PRN    DISMISSAL INSTRUCTIONS:  - Ice pack to scrotum 15 to 20 minutes each hour awake for 36 to 40 hours.  - No strenuous activity or ejaculation for 14 days.  - No unprotected sexual activity until proven azoospermia on semen samples at 3 months.  - Referred to patient handout for normal postop expectations and indications to contact nurse or physician.    M.D.: Alden Albarran M.D.

## 2017-06-02 NOTE — MR AVS SNAPSHOT
After Visit Summary   6/2/2017    Denny Herrera    MRN: [id number]           Patient Information     Date Of Birth          1980        Visit Information        Provider Department      6/2/2017 10:30 AM Alden Albarran MD; UB VAS ROOM [facility] [place]        Today's Diagnoses     Encounter for sterilization    -  1      Care Instructions    POST VASECTOMY INSTRUCTIONS    1.) If you have any concerns or questions, please contact our office at [phone].     2.) It is okay to take a shower, however, do not soak in water (bath,swimming, hot tub,etc....) until your incision is healed.    3.) You might notice some swelling, mild bruising, and discomfort for several days after your vasectomy. This is to be expected. For at least the next 24 hours, an ice pack should be applied for 20 minutes every hour that you are awake. Ice will help with discomfort and swelling. Do not place directly on the skin.    4.) No intercourse, strenuous activity or exercise for at least 7-10 days, even if you feel fine.    5.) You need to wear good scrotal support while you are healing. We strongly recommend an athletic supporter or a pair of regular briefs that are one size too small. Boxer briefs do not offer enough support.    6.) Tylenol as directed on the bottle is preferred for discomfort. Please avoid any blood thinning products such as ibuprofen and aspirin (Motrin, Advil, Excedrin, Aleve, ect..) for at least the next week.    7.) It is normal to have mild drainage from the incision area for several days. However, please contact our office if you notice: bright red blood that does not stop after three days, increased pain, heat at the incision, red streaks, foul smelling discharge, or if you start to run a fever.     8.) YOU MUST CONTINUE BIRTH CONTROL UNTIL WE CONFIRM YOUR STERILITY.  This process can take up to a year to complete (rare occurrence).     9.) You have  been given a form with specimen cup and instructions for your follow up specimen. You will be cleared once we receive ONE negative specimen. If your specimen comes back positive (sperm seen) you will be asked to repeat the test. This does not mean that your vasectomy has failed.             Follow-ups after your visit        Who to contact     If you have questions or need follow up information about today's clinic visit or your schedule please contact [facility] [place] directly at [phone].  Normal or non-critical lab and imaging results will be communicated to you by my[employer]hart, letter or phone within 4 business days after the clinic has received the results. If you do not hear from us within 7 days, please contact the clinic through [employer] or phone. If you have a critical or abnormal lab result, we will notify you by phone as soon as possible.  Submit refill requests through [employer] or call your pharmacy and they will forward the refill request to us. Please allow 3 business days for your refill to be completed.          Additional Information About Your Visit        my[employer]us[employer]J-Kan Information     [employer] gives you secure access to your electronic health record. If you see a primary care provider, you can also send messages to your care team and make appointments. If you have questions, please call your primary care clinic.  If you do not have a primary care provider, please call [phone] and they will assist you.        Care EveryWhere ID     This is your Care EveryWhere ID. This could be used by other organizations to access your [place] medical records  [id number]        Your Vitals Were     Pulse Pulse Oximetry                68 97%           Blood Pressure from Last 3 Encounters:   02/20/17 108/80   11/07/16 143/88   06/17/16 139/85    Weight from Last 3 Encounters:   05/08/17 117.9 kg (260 lb)   02/20/17 117.9 kg (260 lb)   11/07/16 119.7 kg (263 lb 14.4 oz)               We Performed the Following     Surgical pathology exam [[id number]]          Today's Medication Changes          These changes are accurate as of: 6/2/17 10:58 AM.  If you have any questions, ask your nurse or doctor.               Start taking these medicines.        Dose/Directions    HYDROcodone-acetaminophen 5-325 MG per tablet   Commonly known as:  NORCO   Used for:  Encounter for sterilization        Dose:  1 tablet   Take 1 tablet by mouth every 4 hours as needed for pain maximum 6 tablet(s) per day   Quantity:  10 tablet   Refills:  0            Where to get your medicines      Some of these will need a paper prescription and others can be bought over the counter.  Ask your nurse if you have questions.     Bring a paper prescription for each of these medications     HYDROcodone-acetaminophen 5-325 MG per tablet                Primary Care Provider Office Phone # Fax #    Gali Del Valle [phone]       [address] [place] [address]        Thank you!     Thank you for choosing [facility] [place]  for your care. Our goal is always to provide you with excellent care. Hearing back from our patients is one way we can continue to improve our services. Please take a few minutes to complete the written survey that you may receive in the mail after your visit with us. Thank you!             Your Updated Medication List - Protect others around you: Learn how to safely use, store and throw away your medicines at www.disposemymeds.org.          This list is accurate as of: 6/2/17 10:58 AM.  Always use your most recent med list.                   Brand Name Dispense Instructions for use    albuterol 108 (90 BASE) MCG/ACT Inhaler    PROAIR HFA/PROVENTIL HFA/VENTOLIN HFA    1 Inhaler    Inhale 2 puffs into the lungs every 4 hours as needed for shortness of breath / dyspnea or wheezing       allopurinol 300 MG tablet     ZYLOPRIM    135 tablet    Take 1 tablet (300 mg) by mouth daily And 1/2 tablet (150 mg) by mouth in evening       fenofibrate 160 MG tablet     90 tablet    Take 1 tablet (160 mg) by mouth daily       HYDROcodone-acetaminophen 5-325 MG per tablet    NORCO    10 tablet    Take 1 tablet by mouth every 4 hours as needed for pain maximum 6 tablet(s) per day       MIRALAX powder   Generic drug:  polyethylene glycol     510 g    Take 17 g (1 capful) by mouth daily       Multi-vitamin Tabs tablet      Take 1 tablet by mouth daily       OMEGA-3 FISH OIL PO      Take 1 capsule by mouth daily       VITAMIN D (CHOLECALCIFEROL) PO      Take by mouth daily

## 2017-06-06 LAB — COPATH REPORT: NORMAL

## 2017-07-17 DIAGNOSIS — M25.561 ACUTE PAIN OF RIGHT KNEE: Primary | ICD-10-CM

## 2017-07-27 ENCOUNTER — OFFICE VISIT (OUTPATIENT)
Dept: PEDIATRICS | Facility: CLINIC | Age: 37
End: 2017-07-27
Payer: COMMERCIAL

## 2017-07-27 VITALS
BODY MASS INDEX: 36.16 KG/M2 | DIASTOLIC BLOOD PRESSURE: 80 MMHG | OXYGEN SATURATION: 98 % | HEART RATE: 71 BPM | SYSTOLIC BLOOD PRESSURE: 132 MMHG | WEIGHT: 267 LBS | RESPIRATION RATE: 12 BRPM | HEIGHT: 72 IN

## 2017-07-27 DIAGNOSIS — S83.241D TEAR OF MEDIAL MENISCUS OF RIGHT KNEE, CURRENT, UNSPECIFIED TEAR TYPE, SUBSEQUENT ENCOUNTER: ICD-10-CM

## 2017-07-27 DIAGNOSIS — Z01.818 PREOP GENERAL PHYSICAL EXAM: Primary | ICD-10-CM

## 2017-07-27 PROCEDURE — 99214 OFFICE O/P EST MOD 30 MIN: CPT | Mod: GC | Performed by: PEDIATRICS

## 2017-07-27 NOTE — LETTER
[address] [address]                  [phone]   July 27, 2017        To whom it may concern:    Denny Herrera is a patient under my care. Given a recent surgery, he will need to have access to an elevator for the next couple of months. Thank you.       Please contact me if you have any questions.           Best regards,      Maria D Callejas MD  IM/Peds PGY-4

## 2017-07-27 NOTE — PATIENT INSTRUCTIONS
Thank you for coming in to clinic today. It was a pleasure to meet you. I am currently building my patient panel and would be happy to see you back in clinic. I currently see patients on Thursday mornings and afternoons.     Maria D Callejas MD  Internal Medicine-Pediatrics Resident  For appointments: [phone]      Doctor's Instructions:         Before Your Surgery      Call your surgeon if there is any change in your health. This includes signs of a cold or flu (such as a sore throat, runny nose, cough, rash or fever).    Do not smoke, drink alcohol or take over the counter medicine (unless your surgeon or primary care doctor tells you to) for the 24 hours before and after surgery.    If you take prescribed drugs: Follow your doctor s orders about which medicines to take and which to stop until after surgery.    Eating and drinking prior to surgery: follow the instructions from your surgeon    Take a shower or bath the night before surgery. Use the soap your surgeon gave you to gently clean your skin. If you do not have soap from your surgeon, use your regular soap. Do not shave or scrub the surgery site.  Wear clean pajamas and have clean sheets on your bed.

## 2017-07-27 NOTE — PROGRESS NOTES
[facility] [place]  [address]  [address]  [place] [address]  [phone]  Dept: [phone]    PRE-OP EVALUATION:  Today's date: 2017    Denny Herrera (: 1980) presents for pre-operative evaluation assessment as requested by Dr. Jared Valdez.  He requires evaluation and anesthesia risk assessment prior to undergoing surgery/procedure for treatment of right knee injury.  Proposed procedure: Right knee arthroscopy     Date of Surgery/ Procedure: 17  Time of Surgery/ Procedure: 1600  Hospital/Surgical Facility: [facility]   Fax number for surgical facility: not available  Primary Physician: Gali Del Valle  Type of Anesthesia Anticipated: General    Patient has a Health Care Directive or Living Will:  NO    Preop Questions 2017   1.  Do you have a history of heart attack, stroke, stent, bypass or surgery on an artery in the head, neck, heart or legs? No   2.  Do you ever have any pain or discomfort in your chest? No   3.  Do you have a history of  Heart Failure? No   4.   Are you troubled by shortness of breath when:  walking on a level surface, or up a slight hill, or at night? No   5.  Do you currently have a cold, bronchitis or other respiratory infection? No   6.  Do you have a cough, shortness of breath, or wheezing? No   7.  Do you sometimes get pains in the calves of your legs when you walk? No   8. Do you or anyone in your family have previous history of blood clots? No   9.  Do you or does anyone in your family have a serious bleeding problem such as prolonged bleeding following surgeries or cuts? No   10. Have you ever had problems with anemia or been told to take iron pills? No   11. Have you had any abnormal blood loss such as black, tarry or bloody stools? No   12. Have you ever had a blood transfusion? No   13. Have you or any of your relatives ever had problems with anesthesia? No   14. Do you have sleep apnea, excessive snoring or daytime  drowsiness? No   15. Do you have any prosthetic heart valves? No   16. Do you have prosthetic joints? No           HPI:                                                      Brief HPI related to upcoming procedure: 1. Near complete radial tear of the posterior body/posterior horn of the medial meniscus superimposed on meniscal degeneration. This is in the setting of prior meniscectomy. 2. 6mm transverse by 6mm AP area of full-thickness cartilage loss in the medial femoral condyle in the site of donor osteochondral allograft with underlying bony edema in the medial femoral condyle      Pre-HYPERTENSION - Not currently on any medication for HTN management. Currently denies any symptoms referable to elevated blood pressure. Specifically denies chest pain, palpitations, dyspnea, orthopnea, PND or peripheral edema. Blood pressure readings have been in 130s in clinic.                                                                                                                                       .  HYPERLIPIDEMIA - Patient has a long history of significant Hyperlipidemia requiring medication for treatment with recent good control. Patient reports no problems or side effects with the medication.                                                                                                                                                       .    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 No evidence of sclerosis on exam.       Raynaud's syndrome 03/14/2014     Priority: Medium     Hepatic steatosis 03/14/2014     Priority: Medium      Hypertriglyceridemia 02/08/2013     Priority: Medium     Obesity 02/08/2013     Priority: Medium     Elevated blood pressure reading without diagnosis of hypertension 02/23/2012     Priority: Medium     CARDIOVASCULAR SCREENING; LDL GOAL LESS THAN 160 06/20/2011     Priority: Medium      Past Medical History:   Diagnosis Date     Ankle joint pain 11/9/2012     Blood in semen      CARDIOVASCULAR SCREENING; LDL GOAL LESS THAN 160 6/20/2011     Elevated blood pressure reading without diagnosis of hypertension      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     Gout      Hepatic steatosis 3/14/2014     Hypertriglyceridaemia      Hypertriglyceridemia 2/8/2013     Obesity 2/8/2013     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 2 for R knee, 3 L knee, ACL and meniscus arthroscopic procedures x 3, 2 open surgery     COLONOSCOPY N/A 8/19/2015    Procedure: COLONOSCOPY;  Surgeon: Timbo Greenberg MD;  Location:  GI     ORTHOPEDIC SURGERY       OSTEOTOMY TIBIA Right 6/15/2016    Procedure: OSTEOTOMY TIBIA;  Surgeon: Tim Valdez MD;  Location: [facility] OR     REMOVE HARDWARE KNEE Right 6/15/2016    Procedure: REMOVE HARDWARE KNEE;  Surgeon: Tim Valdez MD;  Location: [facility] OR     Current Outpatient Prescriptions   Medication Sig Dispense Refill     allopurinol (ZYLOPRIM) 300 MG tablet Take 1 tablet (300 mg) by mouth daily And 1/2 tablet (150 mg) by mouth in evening 135 tablet 2     fenofibrate 160 MG tablet Take 1 tablet (160 mg) by mouth daily 90  tablet 2     polyethylene glycol (MIRALAX) powder Take 17 g (1 capful) by mouth daily 510 g 1     Omega-3 Fatty Acids (OMEGA-3 FISH OIL PO) Take 1 capsule by mouth daily       multivitamin, therapeutic with minerals (MULTI-VITAMIN) TABS Take 1 tablet by mouth daily       VITAMIN D, CHOLECALCIFEROL, PO Take by mouth daily       OTC products: no recent use of OTC ASA, NSAIDS or Steroids    Allergies   Allergen Reactions     No Known Drug Allergies       Latex Allergy: NO    Social History   Substance Use Topics     Smoking status: Never Smoker     Smokeless tobacco: Never Used     Alcohol use 0.0 - 1.0 oz/week     0 - 2 Standard drinks or equivalent per week      Comment: rarely     History   Drug Use No       REVIEW OF SYSTEMS:                                                    No fevers chills cough cold congestion. No chest pain, shortness of breath or dyspnea with exertion. No abdominal pain, nausea, vomiting, diarrhea, constipation. No urinary symptoms. NO joint pain outside of his right knee or skin changes.     EXAM:                                                    /80 (BP Location: Right arm, Patient Position: Chair, Cuff Size: Adult Regular)  Pulse 71  Resp 12  Ht 6' (1.829 m)  Wt 267 lb (121.1 kg)  SpO2 98%  BMI 36.21 kg/m2     Exam:  General: the patient is pleasant, resting comfortably, no acute distress.   HEENT: Normocephalic, atraumatic.  Lungs: Breathing comfortably on room air. Clear to auscultation, no wheezes or crackles.   CV: RRR, nl s1 and s2, no murmurs.   Abdomen: Soft, nondistended, nontender. No rebound or guarding. Bowel sounds active.  Extremities: No lower extremity edema. Peripheral pulses strong and symmetric.   Skin: no appreciable skin lesions/rashes on exposed skin.   Neuro: Alert and oriented x4, grossly normal neurologic exam. Antalgic gait    DIAGNOSTICS:                                                    No labs or EKG required for low risk surgery (cataract, skin  procedure, breast biopsy, etc)    Recent Labs   Lab Test  01/17/17   0714  06/17/16   0646   12/19/15   0823  07/13/15   1949   HGB  14.0  11.8*   < >   --   14.9   PLT  236   --    --    --   211   NA  141   --    --   140   --    POTASSIUM  4.1   --    --   3.7   --    CR  1.08   --    --   0.93   --     < > = values in this interval not displayed.        IMPRESSION:                                                    Reason for surgery/procedure: meniscal injury  Diagnosis/reason for consult: preop evaluation    The proposed surgical procedure is considered INTERMEDIATE risk.    REVISED CARDIAC RISK INDEX  The patient has the following serious cardiovascular risks for perioperative complications such as (MI, PE, VFib and 3  AV Block):  No serious cardiac risks  INTERPRETATION: 0 risks: Class I (very low risk - 0.4% complication rate)    The patient has the following additional risks for perioperative complications:  Obesity.      ICD-10-CM    1. Preop general physical exam Z01.818    2. Tear of medial meniscus of right knee, current, unspecified tear type, subsequent encounter S83.470D        RECOMMENDATIONS:                                                        Cardiovascular Risk  Pre-hypertension, not currently on medications.     --Patient instructed to hold all of his medications on the day of surgery and resume the following day    APPROVAL GIVEN to proceed with proposed procedure, without further diagnostic evaluation       Signed Electronically by: Maria D Callejas MD    Copy of this evaluation report is provided to requesting physician.    [facility] Preop Guidelines    ===========  STAFF NOTE:  Patient seen with resident physician today.  I was physically present during key portions of the visit and participated in the evaluation and management of the patient today.     ASSESSMENT:      ICD-10-CM    1. Preop general physical exam Z01.818    2. Tear of medial meniscus of right knee, current, unspecified tear  type, subsequent encounter S83.241I        Matt Andrews MD

## 2017-07-27 NOTE — NURSING NOTE
Chief Complaint   Patient presents with     Pre-Op Exam       Initial /80 (BP Location: Right arm, Patient Position: Chair, Cuff Size: Adult Regular)  Pulse 71  Resp 12  Ht 6' (1.829 m)  Wt 267 lb (121.1 kg)  SpO2 98%  BMI 36.21 kg/m2 Estimated body mass index is 36.21 kg/(m^2) as calculated from the following:    Height as of this encounter: 6' (1.829 m).    Weight as of this encounter: 267 lb (121.1 kg).  Medication Reconciliation: complete  Sylvia Jones, CMA

## 2017-07-27 NOTE — MR AVS SNAPSHOT
After Visit Summary   7/27/2017    Denny Herrera    MRN: [id number]           Patient Information     Date Of Birth          1980        Visit Information        Provider Department      7/27/2017 3:45 PM Maria D Callejas MD [facility]        Today's Diagnoses     Preop general physical exam    -  1      Care Instructions    Thank you for coming in to clinic today. It was a pleasure to meet you. I am currently building my patient panel and would be happy to see you back in clinic. I currently see patients on Thursday mornings and afternoons.     Maria D Callejas MD  Internal Medicine-Pediatrics Resident  For appointments: [phone]      Doctor's Instructions:         Before Your Surgery      Call your surgeon if there is any change in your health. This includes signs of a cold or flu (such as a sore throat, runny nose, cough, rash or fever).    Do not smoke, drink alcohol or take over the counter medicine (unless your surgeon or primary care doctor tells you to) for the 24 hours before and after surgery.    If you take prescribed drugs: Follow your doctor s orders about which medicines to take and which to stop until after surgery.    Eating and drinking prior to surgery: follow the instructions from your surgeon    Take a shower or bath the night before surgery. Use the soap your surgeon gave you to gently clean your skin. If you do not have soap from your surgeon, use your regular soap. Do not shave or scrub the surgery site.  Wear clean pajamas and have clean sheets on your bed.           Follow-ups after your visit        Who to contact     If you have questions or need follow up information about today's clinic visit or your schedule please contact [facility] directly at [phone].  Normal or non-critical lab and imaging results will be communicated to you by MyChart, letter or phone within 4 business days after the clinic has received the results. If you do not hear from us  within 7 days, please contact the clinic through [employer] or phone. If you have a critical or abnormal lab result, we will notify you by phone as soon as possible.  Submit refill requests through [employer] or call your pharmacy and they will forward the refill request to us. Please allow 3 business days for your refill to be completed.          Additional Information About Your Visit        [facility] Medical Information     [employer] gives you secure access to your electronic health record. If you see a primary care provider, you can also send messages to your care team and make appointments. If you have questions, please call your primary care clinic.  If you do not have a primary care provider, please call [phone] and they will assist you.        Care EveryWhere ID     This is your Care EveryWhere ID. This could be used by other organizations to access your [facility] medical records  [id number]        Your Vitals Were     Pulse Respirations Height Pulse Oximetry BMI (Body Mass Index)       71 12 6' (1.829 m) 98% 36.21 kg/m2        Blood Pressure from Last 3 Encounters:   07/27/17 132/80   02/20/17 108/80   11/07/16 143/88    Weight from Last 3 Encounters:   07/27/17 267 lb (121.1 kg)   05/08/17 260 lb (117.9 kg)   02/20/17 260 lb (117.9 kg)              Today, you had the following     No orders found for display         Today's Medication Changes          These changes are accurate as of: 7/27/17  4:46 PM.  If you have any questions, ask your nurse or doctor.               Stop taking these medicines if you haven't already. Please contact your care team if you have questions.     albuterol 108 (90 BASE) MCG/ACT Inhaler   Commonly known as:  PROAIR HFA/PROVENTIL HFA/VENTOLIN HFA   Stopped by:  Maria D Callejas MD           HYDROcodone-acetaminophen 5-325 MG per tablet   Commonly known as:  NORCO   Stopped by:  Maria D Callejas MD                    Primary Care Provider Office Phone # Fax #    Gali Del Valle MD  [phone]       [facility] [address] [place] [address]        Equal Access to Services     VENKAT HARTMAN : Hadii aad ku hadkieranerick Noonan, marizada norrisblueha, jasmina kalienda maite, hardeep mondragonnikolai tushar. So [facility] [phone].    ATENCIÓN: Si habla español, tiene a tapia disposición servicios gratuitos de asistencia lingüística. Llame al [phone].    We comply with applicable federal civil rights laws and [place] laws. We do not discriminate on the basis of race, color, national origin, age, disability sex, sexual orientation or gender identity.            Thank you!     [facility]  [facility] your [facility]. Our goal is always to provide you with excellent care. Hearing back from our patients is one way we can continue to improve our services. Please take a few minutes to complete the written survey that you may receive in the mail after your visit with us. Thank you!             Your Updated Medication List - Protect others around you: Learn how to safely use, store and throw away your medicines at www.disposemymeds.org.          This list is accurate as of: 7/27/17  4:46 PM.  Always use your most recent med list.                   Brand Name Dispense Instructions for use Diagnosis    allopurinol 300 MG tablet    ZYLOPRIM    135 tablet    Take 1 tablet (300 mg) by mouth daily And 1/2 tablet (150 mg) by mouth in evening    Podagra       fenofibrate 160 MG tablet     90 tablet    Take 1 tablet (160 mg) by mouth daily    Hypertriglyceridemia       MIRALAX powder   Generic drug:  polyethylene glycol     510 g    Take 17 g (1 capful) by mouth daily    Status post osteotomy       Multi-vitamin Tabs tablet      Take 1 tablet by mouth daily        OMEGA-3 FISH OIL PO      Take 1 capsule by mouth daily        VITAMIN D (CHOLECALCIFEROL) PO      Take by mouth daily

## 2017-08-03 ENCOUNTER — THERAPY VISIT (OUTPATIENT)
Dept: PHYSICAL THERAPY | Facility: CLINIC | Age: 37
End: 2017-08-03
Payer: COMMERCIAL

## 2017-08-03 DIAGNOSIS — Z98.890 STATUS POST SURGERY: ICD-10-CM

## 2017-08-03 DIAGNOSIS — M25.561 ACUTE PAIN OF RIGHT KNEE: ICD-10-CM

## 2017-08-03 PROCEDURE — 97110 THERAPEUTIC EXERCISES: CPT | Mod: GP | Performed by: PHYSICAL THERAPIST

## 2017-08-03 PROCEDURE — 97161 PT EVAL LOW COMPLEX 20 MIN: CPT | Mod: GP | Performed by: PHYSICAL THERAPIST

## 2017-08-03 ASSESSMENT — ACTIVITIES OF DAILY LIVING (ADL)
WALK: ACTIVITY IS VERY DIFFICULT
STAND: ACTIVITY IS FAIRLY DIFFICULT
HOW_WOULD_YOU_RATE_THE_CURRENT_FUNCTION_OF_YOUR_KNEE_DURING_YOUR_USUAL_DAILY_ACTIVITIES_ON_A_SCALE_FROM_0_TO_100_WITH_100_BEING_YOUR_LEVEL_OF_KNEE_FUNCTION_PRIOR_TO_YOUR_INJURY_AND_0_BEING_THE_INABILITY_TO_PERFORM_ANY_OF_YOUR_USUAL_DAILY_ACTIVITIES?: 30
PAIN: THE SYMPTOM AFFECTS MY ACTIVITY SEVERELY
AS_A_RESULT_OF_YOUR_KNEE_INJURY,_HOW_WOULD_YOU_RATE_YOUR_CURRENT_LEVEL_OF_DAILY_ACTIVITY?: SEVERELY ABNORMAL
RAW_SCORE: 21
SWELLING: THE SYMPTOM AFFECTS MY ACTIVITY SEVERELY
LIMPING: THE SYMPTOM AFFECTS MY ACTIVITY SEVERELY
KNEE_ACTIVITY_OF_DAILY_LIVING_SUM: 21
SIT WITH YOUR KNEE BENT: ACTIVITY IS MINIMALLY DIFFICULT
SQUAT: ACTIVITY IS VERY DIFFICULT
GO DOWN STAIRS: ACTIVITY IS VERY DIFFICULT
HOW_WOULD_YOU_RATE_THE_OVERALL_FUNCTION_OF_YOUR_KNEE_DURING_YOUR_USUAL_DAILY_ACTIVITIES?: SEVERELY ABNORMAL
KNEEL ON THE FRONT OF YOUR KNEE: I AM UNABLE TO DO THE ACTIVITY
RISE FROM A CHAIR: ACTIVITY IS MINIMALLY DIFFICULT
STIFFNESS: THE SYMPTOM AFFECTS MY ACTIVITY SEVERELY
GIVING WAY, BUCKLING OR SHIFTING OF KNEE: THE SYMPTOM AFFECTS MY ACTIVITY MODERATELY
KNEE_ACTIVITY_OF_DAILY_LIVING_SCORE: 30
GO UP STAIRS: ACTIVITY IS VERY DIFFICULT
WEAKNESS: THE SYMPTOM AFFECTS MY ACTIVITY SEVERELY

## 2017-08-03 NOTE — PROGRESS NOTES
Subjective:    Patient is a 37 year old male presenting with rehab left knee hpi. The history is provided by the patient. No  was used.   Denny Herrera is a 37 year old male with a right knee condition.  Condition occurred with:  A fall/slip.  Condition occurred: in the community.  This is a new condition  Pt reports tearing meniscus/cartilage graft surgically implanted one year ago while stepping off a deck a few weeks ago. DOS 7/31/17..    Patient reports pain:  Anterior and medial.  Radiates to:  Knee.  Pain is described as aching and is intermittent and reported as 7/10.  Associated symptoms:  Loss of strength and loss of motion/stiffness. Pain is worse during the day.  Symptoms are exacerbated by activity, weight bearing, standing, walking, ascending stairs and descending stairs and relieved by rest.  Since onset symptoms are gradually improving.    Previous treatment includes physical therapy.  There was moderate improvement following previous treatment.  General health as reported by patient is good.  Pertinent medical history includes:  Overweight, osteoarthritis and high blood pressure.  Medical allergies: no.  Other surgeries include:  Orthopedic surgery (R and L knee).  Current medications:  Pain medication.  Current occupation is IT  .  Patient is working in normal job without restrictions.  Primary job tasks include:  Prolonged sitting.    Barriers include:  None as reported by the patient.    Red flags:  None as reported by the patient.                        Objective:      Gait:    Gait Type:  Antalgic   Assistive Devices:  Crutches                                                        Knee Evaluation:  ROM:      PROM    Hyperextension: Left:   Right:  0  Extension: Left:   Right:  0  Flexion: Left:   Right:  102            Palpation:      Left knee tenderness not present at:  Incisional  Right knee tenderness present at:  Incisional  Edema:  Normal            General      ROS    Assessment/Plan:      Patient is a 37 year old male with right side knee complaints.    Patient has the following significant findings with corresponding treatment plan.                Diagnosis 1:  S/p R knee scope  Pain -  hot/cold therapy, self management, education, directional preference exercise and home program  Decreased ROM/flexibility - manual therapy and therapeutic exercise  Decreased joint mobility - manual therapy and therapeutic exercise  Decreased strength - therapeutic exercise and therapeutic activities  Impaired balance - neuro re-education and therapeutic activities  Decreased proprioception - neuro re-education and therapeutic activities  Inflammation - cold therapy  Impaired gait - gait training  Impaired muscle performance - neuro re-education  Decreased function - therapeutic activities    Therapy Evaluation Codes:   1) History comprised of:   Personal factors that impact the plan of care:      None.    Comorbidity factors that impact the plan of care are:      High blood pressure, Osteoarthritis and Overweight.     Medications impacting care: Pain.  2) Examination of Body Systems comprised of:   Body structures and functions that impact the plan of care:      Knee.   Activity limitations that impact the plan of care are:      Bathing, Bending, Dressing, Squatting/kneeling, Stairs and Walking.  3) Clinical presentation characteristics are:   Stable/Uncomplicated.  4) Decision-Making    Low complexity using standardized patient assessment instrument and/or measureable assessment of functional outcome.  Cumulative Therapy Evaluation is: Low complexity.    Previous and current functional limitations:  (See Goal Flow Sheet for this information)    Short term and Long term goals: (See Goal Flow Sheet for this information)     Communication ability:  Patient appears to be able to clearly communicate and understand verbal and written communication and follow directions  correctly.  Treatment Explanation - The following has been discussed with the patient:   RX ordered/plan of care  Anticipated outcomes  Possible risks and side effects  This patient would benefit from PT intervention to resume normal activities.   Rehab potential is good.    Frequency:  2 X week, once daily  Duration:  for 4 weeks  Discharge Plan:  Achieve all LTG.  Independent in home treatment program.  Reach maximal therapeutic benefit.    Please refer to the daily flowsheet for treatment today, total treatment time and time spent performing 1:1 timed codes.

## 2017-08-08 ENCOUNTER — THERAPY VISIT (OUTPATIENT)
Dept: PHYSICAL THERAPY | Facility: CLINIC | Age: 37
End: 2017-08-08
Payer: COMMERCIAL

## 2017-08-08 DIAGNOSIS — Z98.890 STATUS POST SURGERY: ICD-10-CM

## 2017-08-08 DIAGNOSIS — M25.561 ACUTE PAIN OF RIGHT KNEE: ICD-10-CM

## 2017-08-08 PROCEDURE — 97112 NEUROMUSCULAR REEDUCATION: CPT | Mod: GP | Performed by: PHYSICAL THERAPIST

## 2017-08-08 PROCEDURE — 97010 HOT OR COLD PACKS THERAPY: CPT | Mod: GP | Performed by: PHYSICAL THERAPIST

## 2017-08-08 PROCEDURE — 97110 THERAPEUTIC EXERCISES: CPT | Mod: GP | Performed by: PHYSICAL THERAPIST

## 2017-08-11 ENCOUNTER — THERAPY VISIT (OUTPATIENT)
Dept: PHYSICAL THERAPY | Facility: CLINIC | Age: 37
End: 2017-08-11
Payer: COMMERCIAL

## 2017-08-11 DIAGNOSIS — Z98.890 STATUS POST SURGERY: ICD-10-CM

## 2017-08-11 DIAGNOSIS — M25.561 ACUTE PAIN OF RIGHT KNEE: ICD-10-CM

## 2017-08-11 PROCEDURE — 97110 THERAPEUTIC EXERCISES: CPT | Mod: GP | Performed by: PHYSICAL THERAPIST

## 2017-08-11 PROCEDURE — 97112 NEUROMUSCULAR REEDUCATION: CPT | Mod: GP | Performed by: PHYSICAL THERAPIST

## 2017-08-11 PROCEDURE — 97530 THERAPEUTIC ACTIVITIES: CPT | Mod: GP | Performed by: PHYSICAL THERAPIST

## 2017-08-11 ASSESSMENT — ACTIVITIES OF DAILY LIVING (ADL)
AS_A_RESULT_OF_YOUR_KNEE_INJURY,_HOW_WOULD_YOU_RATE_YOUR_CURRENT_LEVEL_OF_DAILY_ACTIVITY?: NEARLY NORMAL
STAND: ACTIVITY IS NOT DIFFICULT
GO DOWN STAIRS: ACTIVITY IS MINIMALLY DIFFICULT
GO UP STAIRS: ACTIVITY IS MINIMALLY DIFFICULT
KNEEL ON THE FRONT OF YOUR KNEE: ACTIVITY IS FAIRLY DIFFICULT
PAIN: I HAVE THE SYMPTOM BUT IT DOES NOT AFFECT MY ACTIVITY
STIFFNESS: I HAVE THE SYMPTOM BUT IT DOES NOT AFFECT MY ACTIVITY
LIMPING: I DO NOT HAVE THE SYMPTOM
RAW_SCORE: 58
RISE FROM A CHAIR: ACTIVITY IS NOT DIFFICULT
HOW_WOULD_YOU_RATE_THE_CURRENT_FUNCTION_OF_YOUR_KNEE_DURING_YOUR_USUAL_DAILY_ACTIVITIES_ON_A_SCALE_FROM_0_TO_100_WITH_100_BEING_YOUR_LEVEL_OF_KNEE_FUNCTION_PRIOR_TO_YOUR_INJURY_AND_0_BEING_THE_INABILITY_TO_PERFORM_ANY_OF_YOUR_USUAL_DAILY_ACTIVITIES?: 80
KNEE_ACTIVITY_OF_DAILY_LIVING_SCORE: 82.86
SIT WITH YOUR KNEE BENT: ACTIVITY IS NOT DIFFICULT
WALK: ACTIVITY IS MINIMALLY DIFFICULT
HOW_WOULD_YOU_RATE_THE_OVERALL_FUNCTION_OF_YOUR_KNEE_DURING_YOUR_USUAL_DAILY_ACTIVITIES?: NEARLY NORMAL
KNEE_ACTIVITY_OF_DAILY_LIVING_SUM: 58
SWELLING: I HAVE THE SYMPTOM BUT IT DOES NOT AFFECT MY ACTIVITY
SQUAT: ACTIVITY IS MINIMALLY DIFFICULT
WEAKNESS: I HAVE THE SYMPTOM BUT IT DOES NOT AFFECT MY ACTIVITY
GIVING WAY, BUCKLING OR SHIFTING OF KNEE: I HAVE THE SYMPTOM BUT IT DOES NOT AFFECT MY ACTIVITY

## 2017-08-11 NOTE — MR AVS SNAPSHOT
After Visit Summary   8/11/2017    Denny Herrera    MRN: [id number]           Patient Information     Date Of Birth          1980        Visit Information        Provider Department      8/11/2017 10:20 AM Gerber Amador, PT [facility]        Today's Diagnoses     Acute pain of right knee        Status post surgery           Follow-ups after your visit        Your next 10 appointments already scheduled     Aug 15, 2017  7:50 AM CDT   RENETTA Extremity with Jose Durand PT   [facility] ([facility])    [address]  [facility] [address]   [phone]            Aug 18, 2017  8:20 AM CDT   RENETTA Extremity with Gerber Amador PT   [facility] ([facility])    [address]  [facility] [id number] [phone]              Who to contact     If you have questions or need follow up information about today's clinic visit or your schedule please contact [facility] directly at [phone].  Normal or non-critical lab and imaging results will be communicated to you by MyChart, letter or phone within 4 business days after the clinic has received the results. If you do not hear from us within 7 days, please contact the clinic through [employer] or phone. If you have a critical or abnormal lab result, we will notify you by phone as soon as possible.  Submit refill requests through Tred or call your pharmacy and they will forward the refill request to us. Please allow 3 business days for your refill to be completed.          Additional Information About Your Visit        MyChart Information     [employer] gives you secure access to your electronic health record. If you see a primary care provider, you can also send messages to your care team and make  appointments. If you have questions, please call your primary care clinic.  If you do not have a primary care provider, please call [phone] and they will assist you.        Care EveryWhere ID     This is your Care EveryWhere ID. This could be used by other organizations to access your [facility] medical records  [id number]         Blood Pressure from Last 3 Encounters:   07/27/17 132/80   02/20/17 108/80   11/07/16 143/88    Weight from Last 3 Encounters:   07/27/17 121.1 kg (267 lb)   05/08/17 117.9 kg (260 lb)   02/20/17 117.9 kg (260 lb)              We Performed the Following     NEUROMUSCULAR RE-EDUCATION     THERAPEUTIC ACTIVITIES     THERAPEUTIC EXERCISES        Primary Care Provider Office Phone # Fax #    Gali Del Valle [phone] [address] [place] [address]        Equal Access to Services     [facility]: Hadii andrea kim hadasho Soomaali, waaxda luqadaha, qaybta kaalmada adeegyada, hardeep mcneal . So [facility] [phone].    ATENCIÓN: Si habla español, tiene a tapia disposición servicios gratuitos de asistencia lingüística. Llame al [phone].    We comply with applicable federal civil rights laws and [place] laws. We do not discriminate on the basis of race, color, national origin, age, disability sex, sexual orientation or gender identity.            Thank you!     Thank you for choosing [facility]  for your care. Our goal is always to provide you with excellent care. Hearing back from our patients is one way we can continue to improve our services. Please take a few minutes to complete the written survey that you may receive in the mail after your visit with us. Thank you!             Your Updated Medication List - Protect others around you: Learn how to safely use, store and throw away your medicines at www.disposemymeds.org.          This list is accurate as of: 8/11/17  10:51 AM.  Always use your most recent med list.                   Brand Name Dispense Instructions for use Diagnosis    allopurinol 300 MG tablet    ZYLOPRIM    135 tablet    Take 1 tablet (300 mg) by mouth daily And 1/2 tablet (150 mg) by mouth in evening    Podagra       fenofibrate 160 MG tablet     90 tablet    Take 1 tablet (160 mg) by mouth daily    Hypertriglyceridemia       MIRALAX powder   Generic drug:  polyethylene glycol     510 g    Take 17 g (1 capful) by mouth daily    Status post osteotomy       Multi-vitamin Tabs tablet      Take 1 tablet by mouth daily        OMEGA-3 FISH OIL PO      Take 1 capsule by mouth daily        VITAMIN D (CHOLECALCIFEROL) PO      Take by mouth daily

## 2017-09-05 NOTE — PROGRESS NOTES
"Discharge Note    Progress reporting period is from initial eval to Aug 11, 2017.     Denny failed to return for next follow up visit and current status is unknown.  Please see information below for last relevant information on current status.  Patient seen for Rxs Used: 3 visits.  SUBJECTIVE  Subjective changes noted by patient:  Subjective: doing well functioning at 80%, still weak and stiff but improving.  Pt stated he may be transferring care to PT at work location.  Current pain level is Current Pain level: 3/10.     Previous pain level was  Initial Pain level: 7/10.   Changes in function:  Yes (See Goal flowsheet attached for changes in current functional level)  Adverse reaction to treatment or activity: None    OBJECTIVE  Changes noted in objective findings: Objective: AROM: 0-0-121 (heel slide) fair control 4\" step, SLS 30sec on airex with mod mm activity     ASSESSMENT/PLAN  Diagnosis: s/p knee scope   DIAGP:  Diagnoses of Acute pain of right knee and Status post surgery were pertinent to this visit.  Updated problem list and treatment plan:   Pain - HEP  Decreased ROM/flexibility - HEP  Decreased function - HEP  Decreased strength - HEP  Impaired muscle performance - HEP  Decreased proprioception - HEP  STG/LTGs have been met or progress has been made towards goals:  Yes, please see goal flowsheet for most current information  Assessment of Progress: current status is unknown.  Last current status: Assessment of progress: Pt is progressing as expected   Self Management Plans:  HEP  I have re-evaluated this patient and find that the nature, scope, duration and intensity of the therapy is appropriate for the medical condition of the patient.  Denny continues to require the following intervention to meet STG and LTG's:  HEP.    Recommendations:  Discharge with current home program.  Patient to follow up with MD as needed.    Please refer to the daily flowsheet for treatment today, total treatment time and " time spent performing 1:1 timed codes.

## 2017-10-25 ENCOUNTER — OFFICE VISIT (OUTPATIENT)
Dept: PEDIATRICS | Facility: CLINIC | Age: 37
End: 2017-10-25
Payer: COMMERCIAL

## 2017-10-25 VITALS
OXYGEN SATURATION: 94 % | SYSTOLIC BLOOD PRESSURE: 142 MMHG | HEIGHT: 72 IN | BODY MASS INDEX: 35.3 KG/M2 | RESPIRATION RATE: 16 BRPM | DIASTOLIC BLOOD PRESSURE: 100 MMHG | HEART RATE: 85 BPM | WEIGHT: 260.6 LBS

## 2017-10-25 DIAGNOSIS — E66.812 CLASS 2 OBESITY DUE TO EXCESS CALORIES WITHOUT SERIOUS COMORBIDITY WITH BODY MASS INDEX (BMI) OF 35.0 TO 35.9 IN ADULT: ICD-10-CM

## 2017-10-25 DIAGNOSIS — E78.1 HYPERTRIGLYCERIDEMIA: ICD-10-CM

## 2017-10-25 DIAGNOSIS — J02.9 ACUTE PHARYNGITIS, UNSPECIFIED ETIOLOGY: ICD-10-CM

## 2017-10-25 DIAGNOSIS — L72.3 SEBACEOUS CYST: ICD-10-CM

## 2017-10-25 DIAGNOSIS — R73.03 PREDIABETES: ICD-10-CM

## 2017-10-25 DIAGNOSIS — E66.09 CLASS 2 OBESITY DUE TO EXCESS CALORIES WITHOUT SERIOUS COMORBIDITY WITH BODY MASS INDEX (BMI) OF 35.0 TO 35.9 IN ADULT: ICD-10-CM

## 2017-10-25 DIAGNOSIS — R03.0 ELEVATED BLOOD PRESSURE READING WITHOUT DIAGNOSIS OF HYPERTENSION: Primary | ICD-10-CM

## 2017-10-25 DIAGNOSIS — K76.0 HEPATIC STEATOSIS: ICD-10-CM

## 2017-10-25 PROCEDURE — 99214 OFFICE O/P EST MOD 30 MIN: CPT | Performed by: PHYSICIAN ASSISTANT

## 2017-10-25 NOTE — PROGRESS NOTES
SUBJECTIVE:   Denny Herrera is a 37 year old male who presents to clinic today for the following health issues:    High blood pressure     Patient seen at [facility] two days ago for a sore throat (negative strep/culture). BP was elevated (140/100) and has continued to be high since. This morning 172/111 left arm - Right arm 146/106.     Lightheaded the past several days when changing positions. Denies SOB, chest pain, palpitations, numbness, tingling, weakness. Can feel pulse. Patient is fatigued.     ST x one week. RST NEGATIVE. No fevers, chills. Cough from PND.     Mentions that about 1.5 weeks ago had a large boil on back. Wife lanced it and it has reduced in size. No discharge. No pain.      ROS:  C: NEGATIVE for fever, chills POSITIVE for fatigue  E/M: NEGATIVE for ear, mouth and throat problems  R: NEGATIVE for significant cough or SOB  CV: NEGATIVE for chest pain, palpitations or peripheral edema  GI: NEGATIVE for nausea, abdominal pain  MUSCULOSKELETAL: NEGATIVE for significant arthralgias or myalgia  NEURO: POSITIVE for lightheadedness; NEGATIVE for weakness, or paresthesias    OBJECTIVE:                                                    BP (!) 142/100 (BP Location: Right arm, Patient Position: Chair, Cuff Size: Adult Large)  Pulse 85  Resp 16  Ht 6' (1.829 m)  Wt 260 lb 9.6 oz (118.2 kg)  SpO2 94%  BMI 35.34 kg/m2  Body mass index is 35.34 kg/(m^2).   GENERAL: alert, no distress  HENT: ear canals- normal; TMs- normal; Nose- normal; Mouth- no ulcers, no lesions; no sinus tenderness   NECK: no tenderness, no adenopathy  RESP: lungs clear to auscultation - no rales, no rhonchi, no wheezes  CV: regular rates and rhythm, normal S1 S2, no S3 or S4 and no murmur, no click or rub  SKIN: inspection of the right upper back reveals a small sebaceous cyst. Slight redness. No warmth, tenderness or discharge.     Diagnostic test results:  No results found for this or any previous visit (from the past 24  hour(s)).       ASSESSMENT/PLAN:                                                    (R03.0) Elevated blood pressure reading without diagnosis of hypertension  (primary encounter diagnosis)  Comment: patient will continue to monitor bp. Return for bp check in 1-2 weeks. Signs for emergent evaluation discussed with patient.  Plan: Comprehensive metabolic panel          (E78.1) Hypertriglyceridemia  Comment: continue to monitor lipids.   Plan: Lipid panel reflex to direct LDL Fasting         (K76.0) Hepatic steatosis  Comment: monitor LFTs.   Plan: Comprehensive metabolic panel          (R73.03) Prediabetes  Comment:   Plan: Comprehensive metabolic panel          (E66.09,  Z68.35) Class 2 obesity due to excess calories without serious comorbidity with body mass index (BMI) of 35.0 to 35.9 in adult  Comment:   Plan:     (J02.9) Acute pharyngitis, unspecified etiology  Comment: continue with symptomatic treatment.   Plan:     (L72.3) Sebaceous cyst  Comment: stable. If symptoms return, will refer to derm for excision.   Plan:     See Patient Instructions    Franky Blanco PA-C  [facility]

## 2017-10-25 NOTE — MR AVS SNAPSHOT
After Visit Summary   10/25/2017    Denny Herrera    MRN: [id number]           Patient Information     Date Of Birth          1980        Visit Information        Provider Department      10/25/2017 8:30 AM Franky Blanco PA-C [facility]        Today's Diagnoses     Elevated blood pressure reading without diagnosis of hypertension    -  1    Hypertriglyceridemia        Hepatic steatosis        Prediabetes          Care Instructions    Increase fluid intake  Continue to check bp three-four times weekly and keep a record  Calibrate home bp cuff at next OV  Have labs completed in [place]   BP check in [place]  Follow up with  Serum             Follow-ups after your visit        Future tests that were ordered for you today     Open Future Orders        Priority Expected Expires Ordered    Comprehensive metabolic panel Routine 11/8/2017 11/25/2017 10/25/2017    Lipid panel reflex to direct LDL Fasting Routine 11/8/2017 11/25/2017 10/25/2017            Who to contact     If you have questions or need follow up information about today's clinic visit or your schedule please contact [facility] directly at [phone].  Normal or non-critical lab and imaging results will be communicated to you by PhaseRxhart, letter or phone within 4 business days after the clinic has received the results. If you do not hear from us within 7 days, please contact the clinic through PhaseRxhart or phone. If you have a critical or abnormal lab result, we will notify you by phone as soon as possible.  Submit refill requests through [employer] or call your pharmacy and they will forward the refill request to us. Please allow 3 business days for your refill to be completed.          Additional Information About Your Visit        MyChart Information     [employer] gives you secure access to your electronic health record. If you see a primary care provider, you can also send messages to your care team  and make appointments. If you have questions, please call your primary care clinic.  If you do not have a primary care provider, please call [phone] and they will assist you.        Care EveryWhere ID     This is your Care EveryWhere ID. This could be used by other organizations to access your [place] medical records  [id number]        Your Vitals Were     Pulse Respirations Height Pulse Oximetry BMI (Body Mass Index)       85 16 6' (1.829 m) 94% 35.34 kg/m2        Blood Pressure from Last 3 Encounters:   10/25/17 (!) 142/100   07/27/17 132/80   02/20/17 108/80    Weight from Last 3 Encounters:   10/25/17 260 lb 9.6 oz (118.2 kg)   07/27/17 267 lb (121.1 kg)   05/08/17 260 lb (117.9 kg)               Primary Care Provider Office Phone # Fax #    Gali Del Valle [phone] [address] [place] [address]        Equal Access to Services     [facility]: Hadii aad ku hadasho Soomaali, waaxda luqadaha, qaybta kaalmada adeegyada, waxay idiin haymauricen kristian mcneal . So [facility] [phone].    ATENCIÓN: Si habla español, tiene a tapia disposición servicios gratuitos de asistencia lingüística. Llame al [phone].    We comply with applicable federal civil rights laws and [place] laws. We do not discriminate on the basis of race, color, national origin, age, disability, sex, sexual orientation, or gender identity.            Thank you!     Thank you for choosing [facility]  for your care. Our goal is always to provide you with excellent care. Hearing back from our patients is one way we can continue to improve our services. Please take a few minutes to complete the written survey that you may receive in the mail after your visit with us. Thank you!             Your Updated Medication List - Protect others around you: Learn how to safely use, store and throw away your medicines at www.disposemymeds.org.          This list is accurate as of: 10/25/17   9:07 AM.  Always use your most recent med list.                   Brand Name Dispense Instructions for use Diagnosis    allopurinol 300 MG tablet    ZYLOPRIM    135 tablet    Take 1 tablet (300 mg) by mouth daily And 1/2 tablet (150 mg) by mouth in evening    Podagra       fenofibrate 160 MG tablet     90 tablet    Take 1 tablet (160 mg) by mouth daily    Hypertriglyceridemia       MIRALAX powder   Generic drug:  polyethylene glycol     510 g    Take 17 g (1 capful) by mouth daily    Status post osteotomy       Multi-vitamin Tabs tablet      Take 1 tablet by mouth daily        OMEGA-3 FISH OIL PO      Take 1 capsule by mouth daily        VITAMIN D (CHOLECALCIFEROL) PO      Take by mouth daily

## 2017-10-25 NOTE — PATIENT INSTRUCTIONS
Increase fluid intake  Continue to check bp three-four times weekly and keep a record  Calibrate home bp cuff at next OV  Have labs completed in [place]   BP check in [place]  Follow up with Dr. Del Valle

## 2017-10-25 NOTE — NURSING NOTE
Chief Complaint   Patient presents with     Hypertension       Initial BP (!) 148/96 (BP Location: Right arm, Patient Position: Chair, Cuff Size: Adult Large)  Pulse 85  Resp 16  Ht 6' (1.829 m)  Wt 265 lb 6.4 oz (120.4 kg)  SpO2 94%  BMI 35.99 kg/m2 Estimated body mass index is 35.99 kg/(m^2) as calculated from the following:    Height as of this encounter: 6' (1.829 m).    Weight as of this encounter: 265 lb 6.4 oz (120.4 kg).  Medication Reconciliation: complete  Sylvia Jones, CMA

## 2017-11-03 ENCOUNTER — OFFICE VISIT (OUTPATIENT)
Dept: FAMILY MEDICINE | Facility: CLINIC | Age: 37
End: 2017-11-03
Payer: COMMERCIAL

## 2017-11-03 VITALS
BODY MASS INDEX: 35.4 KG/M2 | DIASTOLIC BLOOD PRESSURE: 90 MMHG | WEIGHT: 261 LBS | TEMPERATURE: 98.2 F | SYSTOLIC BLOOD PRESSURE: 124 MMHG | OXYGEN SATURATION: 97 % | HEART RATE: 72 BPM

## 2017-11-03 DIAGNOSIS — E78.1 HYPERTRIGLYCERIDEMIA: ICD-10-CM

## 2017-11-03 DIAGNOSIS — R73.03 PREDIABETES: ICD-10-CM

## 2017-11-03 DIAGNOSIS — K76.0 HEPATIC STEATOSIS: ICD-10-CM

## 2017-11-03 DIAGNOSIS — J02.0 ACUTE STREPTOCOCCAL PHARYNGITIS: Primary | ICD-10-CM

## 2017-11-03 DIAGNOSIS — R03.0 ELEVATED BLOOD PRESSURE READING WITHOUT DIAGNOSIS OF HYPERTENSION: ICD-10-CM

## 2017-11-03 PROCEDURE — 80061 LIPID PANEL: CPT | Performed by: FAMILY MEDICINE

## 2017-11-03 PROCEDURE — 99213 OFFICE O/P EST LOW 20 MIN: CPT | Performed by: FAMILY MEDICINE

## 2017-11-03 PROCEDURE — 36415 COLL VENOUS BLD VENIPUNCTURE: CPT | Performed by: FAMILY MEDICINE

## 2017-11-03 PROCEDURE — 80053 COMPREHEN METABOLIC PANEL: CPT | Performed by: FAMILY MEDICINE

## 2017-11-03 RX ORDER — CEPHALEXIN 500 MG/1
500 CAPSULE ORAL 2 TIMES DAILY
Qty: 10 CAPSULE | Refills: 0 | Status: SHIPPED | OUTPATIENT
Start: 2017-11-03 | End: 2017-11-03

## 2017-11-03 RX ORDER — CEPHALEXIN 500 MG/1
500 CAPSULE ORAL 2 TIMES DAILY
Qty: 10 CAPSULE | Refills: 0 | Status: SHIPPED | OUTPATIENT
Start: 2017-11-03 | End: 2017-11-08

## 2017-11-03 NOTE — PROGRESS NOTES
SUBJECTIVE:   Denyn Herrera is a 37 year old male who presents to clinic today for the following health issues:      RESPIRATORY SYMPTOMS      Duration: over a week    Description  sore throat and stomach ache    Severity: mild    Accompanying signs and symptoms: still having ST after being on antibiotics - should be done with 10 day dose for Strep on sunday    History (predisposing factors):  strep     Precipitating or alleviating factors: None    Therapies tried and outcome:  Amoxicillin     Positive strep culture- [facility]          Problem list and histories reviewed & adjusted, as indicated.  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 2 for R knee, 3 L knee, ACL and meniscus arthroscopic procedures x 3, 2 open surgery     COLONOSCOPY N/A 8/19/2015    Procedure: COLONOSCOPY;  Surgeon: Timbo Greenberg MD;  Location:  GI     ORTHOPEDIC SURGERY       OSTEOTOMY TIBIA Right 6/15/2016    Procedure: OSTEOTOMY TIBIA;  Surgeon: Tim Valdez MD;  [facility] OR     REMOVE HARDWARE KNEE Right 6/15/2016    Procedure: REMOVE HARDWARE KNEE;  Surgeon: Tim Valdez MD;  [facility] OR       Social History    Substance Use Topics     Smoking status: Never Smoker     Smokeless tobacco: Never Used     Alcohol use 0.0 - 1.0 oz/week     0 - 2 Standard drinks or equivalent per week      Comment: rarely     Family History   Problem Relation Age of Onset     Family History Negative Other      neg for RA, IBD, psoriasis, does not know much about his father side of his family, neg for gout     Arthritis Mother      back DJD     Alcohol/Drug Mother      Depression Mother      Psychotic Disorder Mother      Endocrine Disease Mother      Unknown/Adopted Mother      back issues     Arthritis Brother      back DJD     Arthritis Maternal Grandmother      back DJD     Circulatory Maternal Grandmother      Eye Disorder Maternal Grandmother      CANCER Maternal Aunt      ?type     Hypertension Brother      Circulatory Maternal Grandfather      Eye Disorder Maternal Grandfather      Hypertension Other      maternal grandparents     Alcohol/Drug Brother      Unknown/Adopted Brother      back issues     Unknown/Adopted Other      maternal grandparents - back issues             Reviewed and updated as needed this visit by clinical staffTobacco  Allergies  Meds  Med Hx  Surg Hx  Fam Hx  Soc Hx      Reviewed and updated as needed this visit by Provider         ROS:  Constitutional, HEENT, pulmonary, gi systems are negative, except as otherwise noted.      OBJECTIVE:   /90 (BP Location: Right arm, Patient Position: Chair, Cuff Size: Adult Large)  Pulse 72  Temp 98.2  F (36.8  C) (Oral)  Wt 261 lb (118.4 kg)  SpO2 97%  BMI 35.4 kg/m2  Body mass index is 35.4 kg/(m^2).  GENERAL: healthy, alert and no distress  HENT: normal cephalic/atraumatic, ear canals and TM's normal, nose and mouth without ulcers or lesions, oral mucous membranes moist and mild erythema throat, left tonsil- 2+ (chronic)   NECK: no adenopathy, no asymmetry, masses, or scars and thyroid normal to palpation  RESP: lungs clear to auscultation - no rales, rhonchi  or wheezes  CV: regular rate and rhythm, normal S1 S2, no S3 or S4, no murmur, click or rub, no peripheral edema and peripheral pulses strong    Diagnostic Test Results:  none     ASSESSMENT/PLAN:         1. Acute streptococcal pharyngitis  - will change Abx to see if this helps. Supportive care reviewed   - cephALEXin (KEFLEX) 500 MG capsule; Take 1 capsule (500 mg) by mouth 2 times daily for 5 days  Dispense: 10 capsule; Refill: 0    See Patient Instructions    Carina Wyatt MD  [facility]

## 2017-11-03 NOTE — MR AVS SNAPSHOT
After Visit Summary   11/3/2017    Denny Herrera    MRN: [id number]           Patient Information     Date Of Birth          1980        Visit Information        Provider Department      11/3/2017 10:45 AM Carina Wyatt MD [facility]        Today's Diagnoses     Acute streptococcal pharyngitis    -  1      Care Instructions    Start on keflex  Gargle warm water with salt  Recommend soft bland food for now  Tylenol/ibuprofen as needed for pain           Follow-ups after your visit        Your next 10 appointments already scheduled     Nov 03, 2017 10:45 AM CDT   SHORT with Carina Wyatt MD   [facility] ([facility])    [address] [place] [address] [phone]              Who to contact     If you have questions or need follow up information about today's clinic visit or your schedule please contact [facility] directly at [phone].  Normal or non-critical lab and imaging results will be communicated to you by Koalifyhart, letter or phone within 4 business days after the clinic has received the results. If you do not hear from us within 7 days, please contact the clinic through DrDoctor or phone. If you have a critical or abnormal lab result, we will notify you by phone as soon as possible.  Submit refill requests through DrDoctor or call your pharmacy and they will forward the refill request to us. Please allow 3 business days for your refill to be completed.          Additional Information About Your Visit        Koalifyhart Information     DrDoctor gives you secure access to your electronic health record. If you see a primary care provider, you can also send messages to your care team and make appointments. If you have questions, please call your primary care clinic.  If you do not have a primary care provider, please call [phone] and they will assist you.        Care  EveryWhere ID     This is your Care EveryWhere ID. This could be used by other organizations to access your [place] medical records  [id number]        Your Vitals Were     Pulse Temperature Pulse Oximetry BMI (Body Mass Index)          72 98.2  F (36.8  C) (Oral) 97% 35.4 kg/m2         Blood Pressure from Last 3 Encounters:   11/03/17 124/90   10/25/17 (!) 142/100   07/27/17 132/80    Weight from Last 3 Encounters:   11/03/17 261 lb (118.4 kg)   10/25/17 260 lb 9.6 oz (118.2 kg)   07/27/17 267 lb (121.1 kg)              Today, you had the following     No orders found for display         Today's Medication Changes          These changes are accurate as of: 11/3/17 10:04 AM.  If you have any questions, ask your nurse or doctor.               Start taking these medicines.        Dose/Directions    cephALEXin 500 MG capsule   Commonly known as:  KEFLEX   Used for:  Acute streptococcal pharyngitis   Started by:  Carina Wyatt MD        Dose:  500 mg   Take 1 capsule (500 mg) by mouth 2 times daily for 5 days   Quantity:  10 capsule   Refills:  0            Where to get your medicines      These medications were sent to [facility] [address] [place] [address]    Hours:  24-hours Phone:  [phone]     cephALEXin 500 MG capsule                Primary Care Provider Office Phone # Fax #    Gali Del Valle [phone]       [facility] [facility] [address] [place] [address]        Equal Access to Services     [facility]: Haddamian Noonan, tierney ramsey, qahardeep reyes. So [facility] [phone].    ATENCIÓN: Si habla español, tiene a tapia disposición servicios gratuitos de asistencia lingüística. Llame al [phone].    We comply with applicable federal civil rights laws and [place] laws. We do not discriminate on the  basis of race, color, national origin, age, disability, sex, sexual orientation, or gender identity.            Thank you!     Thank you for choosing [facility]  for your care. Our goal is always to provide you with excellent care. Hearing back from our patients is one way we can continue to improve our services. Please take a few minutes to complete the written survey that you may receive in the mail after your visit with us. Thank you!             Your Updated Medication List - Protect others around you: Learn how to safely use, store and throw away your medicines at www.disposemymeds.org.          This list is accurate as of: 11/3/17 10:04 AM.  Always use your most recent med list.                   Brand Name Dispense Instructions for use Diagnosis    allopurinol 300 MG tablet    ZYLOPRIM    135 tablet    Take 1 tablet (300 mg) by mouth daily And 1/2 tablet (150 mg) by mouth in evening    Podagra       cephALEXin 500 MG capsule    KEFLEX    10 capsule    Take 1 capsule (500 mg) by mouth 2 times daily for 5 days    Acute streptococcal pharyngitis       fenofibrate 160 MG tablet     90 tablet    Take 1 tablet (160 mg) by mouth daily    Hypertriglyceridemia       MIRALAX powder   Generic drug:  polyethylene glycol     510 g    Take 17 g (1 capful) by mouth daily    Status post osteotomy       Multi-vitamin Tabs tablet      Take 1 tablet by mouth daily        OMEGA-3 FISH OIL PO      Take 1 capsule by mouth daily        VITAMIN D (CHOLECALCIFEROL) PO      Take by mouth daily

## 2017-11-03 NOTE — PATIENT INSTRUCTIONS
Start on keflex  Gargle warm water with salt  Recommend soft bland food for now  Tylenol/ibuprofen as needed for pain

## 2017-11-03 NOTE — NURSING NOTE
Chief Complaint   Patient presents with     Pharyngitis     Hypertension       Initial There were no vitals taken for this visit. Estimated body mass index is 35.34 kg/(m^2) as calculated from the following:    Height as of 10/25/17: 6' (1.829 m).    Weight as of 10/25/17: 260 lb 9.6 oz (118.2 kg).  Medication Reconciliation: complete   Rossy Goode CMA

## 2017-11-04 LAB
ALBUMIN SERPL-MCNC: 4.4 G/DL (ref 3.4–5)
ALP SERPL-CCNC: 26 U/L (ref 40–150)
ALT SERPL W P-5'-P-CCNC: 71 U/L (ref 0–70)
ANION GAP SERPL CALCULATED.3IONS-SCNC: 9 MMOL/L (ref 3–14)
AST SERPL W P-5'-P-CCNC: 38 U/L (ref 0–45)
BILIRUB SERPL-MCNC: 0.6 MG/DL (ref 0.2–1.3)
BUN SERPL-MCNC: 16 MG/DL (ref 7–30)
CALCIUM SERPL-MCNC: 9.4 MG/DL (ref 8.5–10.1)
CHLORIDE SERPL-SCNC: 107 MMOL/L (ref 94–109)
CHOLEST SERPL-MCNC: 178 MG/DL
CO2 SERPL-SCNC: 24 MMOL/L (ref 20–32)
CREAT SERPL-MCNC: 1.02 MG/DL (ref 0.66–1.25)
GFR SERPL CREATININE-BSD FRML MDRD: 81 ML/MIN/1.7M2
GLUCOSE SERPL-MCNC: 103 MG/DL (ref 70–99)
HDLC SERPL-MCNC: 29 MG/DL
LDLC SERPL CALC-MCNC: 109 MG/DL
NONHDLC SERPL-MCNC: 149 MG/DL
POTASSIUM SERPL-SCNC: 3.9 MMOL/L (ref 3.4–5.3)
PROT SERPL-MCNC: 8.3 G/DL (ref 6.8–8.8)
SODIUM SERPL-SCNC: 140 MMOL/L (ref 133–144)
TRIGL SERPL-MCNC: 202 MG/DL

## 2017-11-22 DIAGNOSIS — Z30.2 ENCOUNTER FOR STERILIZATION: ICD-10-CM

## 2017-11-22 LAB — SEMEN ANALYSIS P VAS PNL: NORMAL

## 2017-11-22 PROCEDURE — 89321 SEMEN ANAL SPERM DETECTION: CPT | Performed by: UROLOGY

## 2017-11-27 NOTE — PROGRESS NOTES
I called patient, with no answer. I didn't leave a message. Lien Rodarte LPN 1:06 PM November 27, 2017

## 2018-03-05 ENCOUNTER — OFFICE VISIT (OUTPATIENT)
Dept: PEDIATRICS | Facility: CLINIC | Age: 38
End: 2018-03-05
Payer: COMMERCIAL

## 2018-03-05 VITALS
HEART RATE: 63 BPM | BODY MASS INDEX: 36 KG/M2 | SYSTOLIC BLOOD PRESSURE: 140 MMHG | OXYGEN SATURATION: 98 % | HEIGHT: 72 IN | DIASTOLIC BLOOD PRESSURE: 88 MMHG | TEMPERATURE: 96.9 F | WEIGHT: 265.8 LBS

## 2018-03-05 DIAGNOSIS — R41.840 ATTENTION DEFICIT: Primary | ICD-10-CM

## 2018-03-05 DIAGNOSIS — I10 BENIGN ESSENTIAL HYPERTENSION: ICD-10-CM

## 2018-03-05 DIAGNOSIS — E66.01 MORBID OBESITY (H): ICD-10-CM

## 2018-03-05 PROCEDURE — 99214 OFFICE O/P EST MOD 30 MIN: CPT | Performed by: INTERNAL MEDICINE

## 2018-03-05 RX ORDER — LISINOPRIL 10 MG/1
10 TABLET ORAL DAILY
Qty: 30 TABLET | Refills: 3 | Status: SHIPPED | OUTPATIENT
Start: 2018-03-05 | End: 2018-10-25

## 2018-03-05 NOTE — MR AVS SNAPSHOT
After Visit Summary   3/5/2018    Denny Herrera    MRN: [id number]           Patient Information     Date Of Birth          1980        Visit Information        Provider Department      3/5/2018 2:40 PM Gali Del Valle MD [facility]        Today's Diagnoses     Attention deficit    -  1    Benign essential hypertension          Care Instructions    1. You will be contacted to set up testing for ADHD  - if too far out, can call insurance and find out what is covered for neuropsych testing for ADHD  2. Follow-up once testing is back  3. Start lisinopril 10 mg once a day          Follow-ups after your visit        Additional Services     MENTAL HEALTH REFERRAL  - Adult; Assessments and Testing; ADHD; FMG: [facility] [phone]; We will contact you to schedule the appointment or please call with any questions       All scheduling is subject to the client's specific insurance plan & benefits, provider/location availability, and provider clinical specialities.  Please arrive 15 minutes early for your first appointment and bring your completed paperwork.    Please be aware that coverage of these services is subject to the terms and limitations of your health insurance plan.  Call member services at your health plan with any benefit or coverage questions.                            Who to contact     If you have questions or need follow up information about today's clinic visit or your schedule please contact [facility] directly at [phone].  Normal or non-critical lab and imaging results will be communicated to you by MyChart, letter or phone within 4 business days after the clinic has received the results. If you do not hear from us within 7 days, please contact the clinic through MyChart or phone. If you have a critical or abnormal lab result, we will notify you by phone as soon as possible.  Submit refill requests through [employer] masterhart or call your  "pharmacy and they will forward the refill request to us. Please allow 3 business days for your refill to be completed.          Additional Information About Your Visit        Targeted Instant Communicationshart Information     [employer] gives you secure access to your electronic health record. If you see a primary care provider, you can also send messages to your care team and make appointments. If you have questions, please call your primary care clinic.  If you do not have a primary care provider, please call [phone] and they will assist you.        Care EveryWhere ID     This is your Care EveryWhere ID. This could be used by other organizations to access your [place] medical records  [id number]        Your Vitals Were     Pulse Temperature Height Pulse Oximetry BMI (Body Mass Index)       63 96.9  F (36.1  C) (Tympanic) 5' 11.75\" (1.822 m) 98% 36.3 kg/m2        Blood Pressure from Last 3 Encounters:   03/05/18 140/88   11/03/17 124/90   10/25/17 (!) 142/100    Weight from Last 3 Encounters:   03/05/18 265 lb 12.8 oz (120.6 kg)   11/03/17 261 lb (118.4 kg)   10/25/17 260 lb 9.6 oz (118.2 kg)              We Performed the Following     MENTAL HEALTH REFERRAL  - Adult; Assessments and Testing; ADHD; FMG: [facility] [phone]; We will contact you to schedule the appointment or please call with any questions          Today's Medication Changes          These changes are accurate as of 3/5/18  3:20 PM.  If you have any questions, ask your nurse or doctor.               Start taking these medicines.        Dose/Directions    lisinopril 10 MG tablet   Commonly known as:  PRINIVIL/ZESTRIL   Used for:  Benign essential hypertension   Started by:  Gali Del Valle MD        Dose:  10 mg   Take 1 tablet (10 mg) by mouth daily   Quantity:  30 tablet   Refills:  3            Where to get your medicines      These medications were sent to [facility] [address] [place] [address] [address] AT [facility] & " [place] [address]  [address] [facility] [address]     Phone:  [phone]     lisinopril 10 MG tablet                Primary Care Provider Office Phone # Fax #    Gali Del Valle [phone] [address] [place] [address]        Equal Access to Services     [facility] : Hadii aad ku hadasho Soomaali, waaxda luqadaha, qaybta kaalmada adeegyada, waxay idiin hayaan adeeg khlarrysh larajanin . So [facility] [phone].    ATENCIÓN: Si habla español, tiene a tapia disposición servicios gratuitos de asistencia lingüística. Llame al [phone].    We comply with applicable federal civil rights laws and [place] laws. We do not discriminate on the basis of race, color, national origin, age, disability, sex, sexual orientation, or gender identity.            Thank you!     Thank you for choosing [facility]  for your care. Our goal is always to provide you with excellent care. Hearing back from our patients is one way we can continue to improve our services. Please take a few minutes to complete the written survey that you may receive in the mail after your visit with us. Thank you!             Your Updated Medication List - Protect others around you: Learn how to safely use, store and throw away your medicines at www.disposemymeds.org.          This list is accurate as of 3/5/18  3:20 PM.  Always use your most recent med list.                   Brand Name Dispense Instructions for use Diagnosis    allopurinol 300 MG tablet    ZYLOPRIM    135 tablet    Take 1 tablet (300 mg) by mouth daily And 1/2 tablet (150 mg) by mouth in evening    Podagra       fenofibrate 160 MG tablet     90 tablet    Take 1 tablet (160 mg) by mouth daily    Hypertriglyceridemia       lisinopril 10 MG tablet    PRINIVIL/ZESTRIL    30 tablet    Take 1 tablet (10 mg) by mouth daily    Benign essential hypertension       MIRALAX powder   Generic drug:  polyethylene glycol     510 g    Take 17 g  (1 capful) by mouth daily    Status post osteotomy       Multi-vitamin Tabs tablet      Take 1 tablet by mouth daily        OMEGA-3 FISH OIL PO      Take 1 capsule by mouth daily        VITAMIN D (CHOLECALCIFEROL) PO      Take by mouth daily

## 2018-03-05 NOTE — PROGRESS NOTES
"  SUBJECTIVE:   Denny Herrera is a 37 year old male who presents to clinic today for the following health issues:    Patient would like to be evaluated for ADD. Son showing signs, brother diagnosed with ADD, did tests online and tests came out positive for ADD.    Son having difficulty at school with focusing and getting work done. Patient had similar problems at that age. Continues to have problems with procrastination. Has a hard time focusing on a project for more than an hour. Stops listening to people when they talk for more than a few minutes. Makes lists and forces self to get back to things that need to get done. Has similar issues at home - multiple projects that are started. When researching for son, did a lot of online tests and thinks he meets the criteria for ADD and wonders if he might benefit from treatment.    Elevated BP: has been mildly elevated since he was about 16 years old. Keeps hoping it will improve if he loses some weight, but has not been able to do so. No headaches, SOB or chest pain.    Reviewed and updated as needed this visit by clinical staff  Tobacco  Allergies  Meds  Problems  Med Hx  Surg Hx  Fam Hx  Soc Hx        Reviewed and updated as needed this visit by Provider  Allergies  Meds  Problems       -------------------------------------    ROS:  Constitutional, HEENT, cardiovascular, pulmonary, gi and gu systems are negative, except as otherwise noted.    OBJECTIVE:                                                    /88 (BP Location: Right arm, Patient Position: Sitting, Cuff Size: Adult Large)  Pulse 63  Temp 96.9  F (36.1  C) (Tympanic)  Ht 5' 11.75\" (1.822 m)  Wt 265 lb 12.8 oz (120.6 kg)  SpO2 98%  BMI 36.3 kg/m2   Body mass index is 36.3 kg/(m^2).  General Appearance: healthy, alert and no distress  Eyes:   no discharge, erythema.  Normal pupils.  Respiratory: lungs clear to auscultation - no rales, rhonchi or wheezes.  Cardiovascular: regular rate and " "rhythm, normal S1 S2, no S3 or S4 and no murmur, click or rub.  No peripheral edema.  Skin: no rashes or lesions.  Well perfused and normal turgor.    Diagnostic Test Results:  none      ASSESSMENT/PLAN:                                                      (R48.360) Attention deficit  (primary encounter diagnosis)  Plan: MENTAL HEALTH REFERRAL  - Adult; Assessments         and Testing; ADHD; FMG: Merged with [facility] [phone]; We will contact you to         schedule the appointment or please call with         any questions  - recommended formal neuropsych testing  - discussed options for treatment include psychology to help with time management skills and medication. Briefly discussed medication side effects including risk for elevated blood pressure.     (I10) Benign essential hypertension  Comment: has had mildly elevated blood pressure for years and has wanted to try to lower with lifestyle changes.  Plan: lisinopril (PRINIVIL/ZESTRIL) 10 MG tablet  - has failed lifestyle management trial  - also interested in starting stimulant medications. Discussed BP needs to be controlled prior to starting.  - start lisinopril 10 mg daily - discussed possible side effects  - recheck BP when follow's up for possible ADD, will need BMP at that point as well.    (E66.01) Morbid Obesity  Comment: BMI of 36 with hypertension and hyperlipidemia  Plan: discussed diet/exercise and weight loss    BMI:   Estimated body mass index is 36.3 kg/(m^2) as calculated from the following:    Height as of this encounter: 5' 11.75\" (1.822 m).    Weight as of this encounter: 265 lb 12.8 oz (120.6 kg).   Weight management plan: Discussed healthy diet and exercise guidelines and patient will follow up in 6 months in clinic to re-evaluate.    Follow up with Provider - 1-2 months when results of neuropsych testing back     [facility]          "

## 2018-03-05 NOTE — PATIENT INSTRUCTIONS
1. You will be contacted to set up testing for ADHD  - if too far out, can call insurance and find out what is covered for neuropsych testing for ADHD  2. Follow-up once testing is back  3. Start lisinopril 10 mg once a day

## 2018-04-09 ENCOUNTER — OFFICE VISIT (OUTPATIENT)
Dept: PEDIATRICS | Facility: CLINIC | Age: 38
End: 2018-04-09
Payer: COMMERCIAL

## 2018-04-09 VITALS
BODY MASS INDEX: 37.34 KG/M2 | WEIGHT: 266.7 LBS | HEART RATE: 68 BPM | OXYGEN SATURATION: 95 % | TEMPERATURE: 97.7 F | SYSTOLIC BLOOD PRESSURE: 134 MMHG | DIASTOLIC BLOOD PRESSURE: 76 MMHG | HEIGHT: 71 IN

## 2018-04-09 DIAGNOSIS — M62.838 NECK MUSCLE SPASM: ICD-10-CM

## 2018-04-09 DIAGNOSIS — I10 BENIGN ESSENTIAL HYPERTENSION: ICD-10-CM

## 2018-04-09 DIAGNOSIS — M54.12 CERVICAL RADICULOPATHY: Primary | ICD-10-CM

## 2018-04-09 PROCEDURE — 99214 OFFICE O/P EST MOD 30 MIN: CPT | Performed by: INTERNAL MEDICINE

## 2018-04-09 RX ORDER — CYCLOBENZAPRINE HCL 10 MG
5-10 TABLET ORAL 3 TIMES DAILY PRN
Qty: 90 TABLET | Refills: 1 | Status: SHIPPED | OUTPATIENT
Start: 2018-04-09 | End: 2019-02-21

## 2018-04-09 RX ORDER — PREDNISONE 20 MG/1
40 TABLET ORAL DAILY
Qty: 10 TABLET | Refills: 0 | Status: SHIPPED | OUTPATIENT
Start: 2018-04-09 | End: 2019-02-21

## 2018-04-09 ASSESSMENT — PAIN SCALES - GENERAL: PAINLEVEL: SEVERE PAIN (7)

## 2018-04-09 NOTE — MR AVS SNAPSHOT
After Visit Summary   4/9/2018    Denny Herrera    MRN: [id number]           Patient Information     Date Of Birth          1980        Visit Information        Provider Department      4/9/2018 12:20 PM Gali Del Valle MD [facility]        Today's Diagnoses     Cervical radiculopathy    -  1    Neck muscle spasm          Care Instructions    1. Continue ice  2. Prednisone 40 mg (2 pills) once a day for 5 days - take in the morning with food  3. Cyclobenzaprine at bedtime as needed to help relax muscles  4. Can use naproxen 2 pills up to twice a day to help with inflammation/pain  5. Continue lisinopril at night - if fatigue not improving over the next month, will plan to try switching to amlodipine instead  6. Referral for physical therapy   7. Follow-up after ADD testing          Follow-ups after your visit        Additional Services     [facility] PT, HAND, AND CHIROPRACTIC REFERRAL       === This order will print in the [facility] Scheduling  Office ===    Physical therapy, hand therapy and chiropractic care are available through:    [facility]  [facility]    Call one easy number to schedule at any of the above locations:  [phone].    Your provider has referred you to Physical Therapy at [facility] or [facility]    Indication/Reason for Referral: Low Back Pain  Onset of Illness:  Years, flare over last 9 days  Therapy Orders:  Evaluate and Treat  Special Programs:  None  Special Request:  None    Additional Comments for the therapist or chiropractor:  none    Please be aware that coverage of these services is subject to the terms and limitations of your health insurance plan.  Call member services at your health plan with any benefit or coverage questions.      Please bring the following to your appointment:    >>   Your personal calendar for scheduling future appointments  >>   Comfortable clothing                  Follow-up  "notes from your care team     Return in about 2 months (around 6/9/2018).      Your next 10 appointments already scheduled     May 21, 2018 12:00 PM CDT   (Arrive by 11:30 AM)   New Visit with Cindy Castañeda PsyD   [facility] ([facility]    [address] [place] [address] [phone]            May 29, 2018  2:00 PM CDT   Return Visit with Cindy Castañeda PsyD   [facility] ([facility]    [address]  [facility] [address] [phone]              Who to contact     If you have questions or need follow up information about today's clinic visit or your schedule please contact [facility] directly at [phone].  Normal or non-critical lab and imaging results will be communicated to you by MyChart, letter or phone within 4 business days after the clinic has received the results. If you do not hear from us within 7 days, please contact the clinic through [employer] or phone. If you have a critical or abnormal lab result, we will notify you by phone as soon as possible.  Submit refill requests through [employer] or call your pharmacy and they will forward the refill request to us. Please allow 3 business days for your refill to be completed.          Additional Information About Your Visit        MyChart Information     [employer] gives you secure access to your electronic health record. If you see a primary care provider, you can also send messages to your care team and make appointments. If you have questions, please call your primary care clinic.  If you do not have a primary care provider, please call [phone] and they will assist you.        Care EveryWhere ID     This is your Care EveryWhere ID. This could be used by other organizations to access your [facility] medical records  [id number]        Your Vitals Were     Pulse Temperature Height Pulse Oximetry BMI (Body Mass Index)       68 97.7  F (36.5  C) (Oral) 5' 10.5\" (1.791 m) 95% " 37.73 kg/m2        Blood Pressure from Last 3 Encounters:   04/09/18 134/76   03/05/18 140/88   11/03/17 124/90    Weight from Last 3 Encounters:   04/09/18 266 lb 11.2 oz (121 kg)   03/05/18 265 lb 12.8 oz (120.6 kg)   11/03/17 261 lb (118.4 kg)              We Performed the Following     [facility] PT, HAND, AND CHIROPRACTIC REFERRAL          Today's Medication Changes          These changes are accurate as of 4/9/18  1:07 PM.  If you have any questions, ask your nurse or doctor.               Start taking these medicines.        Dose/Directions    cyclobenzaprine 10 MG tablet   Commonly known as:  FLEXERIL   Used for:  Neck muscle spasm   Started by:  Gali Del Valle MD        Dose:  5-10 mg   Take 0.5-1 tablets (5-10 mg) by mouth 3 times daily as needed for muscle spasms   Quantity:  90 tablet   Refills:  1       predniSONE 20 MG tablet   Commonly known as:  DELTASONE   Used for:  Cervical radiculopathy   Started by:  Gali Del Valle MD        Dose:  40 mg   Take 2 tablets (40 mg) by mouth daily for 5 days   Quantity:  10 tablet   Refills:  0            Where to get your medicines      These medications were sent to [facility] [address] [place] [id number]    Hours:  24-hours Phone:  [phone]     cyclobenzaprine 10 MG tablet    predniSONE 20 MG tablet                Primary Care Provider Office Phone # Fax #    Gali Del Valle [phone] [address] [place] [address]        Equal Access to Services     KAZ HARTMAN AH: Haddamian Noonan, tierney ramsey, qahardeep reyes. So [facility] [phone].    ATENCIÓN: Si habla español, tiene a tapia disposición servicios gratuitos de asistencia lingüística. Wood al [phone].    We comply with applicable federal civil rights laws and [place] laws. We do  not discriminate on the basis of race, color, national origin, age, disability, sex, sexual orientation, or gender identity.            Thank you!     Thank you for choosing [facility]  for your care. Our goal is always to provide you with excellent care. Hearing back from our patients is one way we can continue to improve our services. Please take a few minutes to complete the written survey that you may receive in the mail after your visit with us. Thank you!             Your Updated Medication List - Protect others around you: Learn how to safely use, store and throw away your medicines at www.disposemymeds.org.          This list is accurate as of 4/9/18  1:07 PM.  Always use your most recent med list.                   Brand Name Dispense Instructions for use Diagnosis    allopurinol 300 MG tablet    ZYLOPRIM    135 tablet    Take 1 tablet (300 mg) by mouth daily And 1/2 tablet (150 mg) by mouth in evening    Podagra       cyclobenzaprine 10 MG tablet    FLEXERIL    90 tablet    Take 0.5-1 tablets (5-10 mg) by mouth 3 times daily as needed for muscle spasms    Neck muscle spasm       fenofibrate 160 MG tablet     90 tablet    Take 1 tablet (160 mg) by mouth daily    Hypertriglyceridemia       lisinopril 10 MG tablet    PRINIVIL/ZESTRIL    30 tablet    Take 1 tablet (10 mg) by mouth daily    Benign essential hypertension       MIRALAX powder   Generic drug:  polyethylene glycol     510 g    Take 17 g (1 capful) by mouth daily    Status post osteotomy       Multi-vitamin Tabs tablet      Take 1 tablet by mouth daily        OMEGA-3 FISH OIL PO      Take 1 capsule by mouth daily        predniSONE 20 MG tablet    DELTASONE    10 tablet    Take 2 tablets (40 mg) by mouth daily for 5 days    Cervical radiculopathy       VITAMIN D (CHOLECALCIFEROL) PO      Take by mouth daily

## 2018-04-09 NOTE — PROGRESS NOTES
SUBJECTIVE:   Denny Herrera is a 37 year old male who presents to clinic today for the following health issues:      Musculoskeletal problem/pain      Duration: 9 days    Description  Location: neck pain with right shoulder    Intensity:  severe, 7/10 radiating    Accompanying signs and symptoms: numbness in fingers and tingling in spine low back    History  Previous similar problem: YES- History of previous shoulder neck pain, 2-3 x per year, for 5 years  Previous evaluation:  none    Precipitating or alleviating factors:  Trauma or overuse: no   Aggravating factors include: heat worse for the neck    Therapies tried and outcome: ice on neck, prednisone - helped, naproxen - no help    Pain in neck and shoulder for past 9 days. 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 One day, when took empty totes and turned had acute onset of shooting pain down right side of neck/arm. Was on light duty for 2 weeks and pain resolved. Had no symptoms for several years. Now will have episodes where wakes up in the morning and has pain. Occasionally pain will be bad and locked up and cannot get out of bed. Current episode started on Saturday - had some pain when woke up and gradually got worse as the day went on. Ended up in bed. Pain constant, worse if turns the right way. Used heating pad on neck and developed numbness in fingers on right and tingling at base of neck. Has never had that in the past. Switched to ice, which helped more. Started naproxen 2 pills. Had left over prednisone - took 2 pills and helped significantly. Has been adjusted by chiro twice - helps some with ROM, but worse again the next day. Has never done physical therapy.     HTN: started on lisinopril earlier this month. Blood pressures have been better, but having significant fatigue. Sleeping all of the time. Taking naps on the weekends.    Reviewed and updated as needed this visit by clinical staff  Tobacco  Allergies  Meds   "Problems  Med Hx  Surg Hx  Fam Hx  Soc Hx        Reviewed and updated as needed this visit by Provider  Allergies  Meds  Problems       -------------------------------------    ROS:  Constitutional, HEENT, cardiovascular, pulmonary, gi and gu systems are negative, except as otherwise noted.    OBJECTIVE:                                                    /76 (BP Location: Right arm, Patient Position: Sitting, Cuff Size: Adult Large)  Pulse 68  Temp 97.7  F (36.5  C) (Oral)  Ht 5' 10.5\" (1.791 m)  Wt 266 lb 11.2 oz (121 kg)  SpO2 95%  BMI 37.73 kg/m2   Body mass index is 37.73 kg/(m^2).  General Appearance: healthy, alert and no distress  Eyes:   no discharge, erythema.  Normal pupils.  Neck: mildly decreased flexion, normal extension, mildly decreased rotation to left, rotation to right decreased to about 45 degrees. No significant midline tenderness. Tender and very tights over right neck extending up to occiput and just below shoulder blade  Skin: no rashes or lesions.  Well perfused and normal turgor.  Neurological: normal strength in bilateral upper extremities    Diagnostic Test Results:  none      ASSESSMENT/PLAN:                                                      (M54.12) Cervical radiculopathy  (primary encounter diagnosis)  (M62.838) Neck muscle spasm  Comment: suspect pinched nerve given radicular symptoms; however, definitely with significant tightness.  Plan: predniSONE (DELTASONE) 20 MG tablet, cyclobenzaprine (FLEXERIL) 10 MG tablet, [facility]         PT, HAND, AND CHIROPRACTIC REFERRAL  - prednisone 40 mg daily for 5 days - discussed taking with food and in the am  - cyclobenzaprine as needed for muscle spasms - discussed possible side effects. Likely will only be able to take at night  - naproxen 2 pills twice a day as needed  - ice or heat to area  - referral to PT  - would consider MRI if symptoms not improving in 6-8 weeks    (I10) Benign essential hypertension  Comment: improved " control, but having significant fatigue  Plan:  - continue lisinopril 10 mg daily  - if fatigue not improving, will switch to amlodipine 2.5 mg daily. Discussed possible side effects    Follow up with Provider - in about 6 weeks     [facility]

## 2018-04-09 NOTE — PATIENT INSTRUCTIONS
1. Continue ice  2. Prednisone 40 mg (2 pills) once a day for 5 days - take in the morning with food  3. Cyclobenzaprine at bedtime as needed to help relax muscles  4. Can use naproxen 2 pills up to twice a day to help with inflammation/pain  5. Continue lisinopril at night - if fatigue not improving over the next month, will plan to try switching to amlodipine instead  6. Referral for physical therapy   7. Follow-up after ADD testing

## 2018-04-22 DIAGNOSIS — M10.9 PODAGRA: ICD-10-CM

## 2018-04-22 DIAGNOSIS — E78.1 HYPERTRIGLYCERIDEMIA: ICD-10-CM

## 2018-04-22 NOTE — TELEPHONE ENCOUNTER
"Requested Prescriptions   Pending Prescriptions Disp Refills     allopurinol (ZYLOPRIM) 300 MG tablet [Pharmacy Med Name: ALLOPURINOL 300MG TABLETS]  Last Written Prescription Date:  03/28/2017  Last Fill Quantity: 135 tablet,  # refills: 2   Last office visit: 4/9/2018 with prescribing provider:  Gali Del Valle MD   Future Office Visit:   Next 5 appointments (look out 90 days)     May 29, 2018  2:00 PM CDT   Return Visit with Cindy Castañeda PsyD   [facility] [place] ([facility] [place])    [address]  [place] [address] [phone]                  135 tablet 0     Sig: TAKE 1 TABLET BY MOUTH EVERY DAY AND 1/2 TABLET BY MOUTH EVERY EVENING    Gout Agents Protocol Failed    4/22/2018 12:04 PM       Failed - CBC on file in past 12 months    Recent Labs   Lab Test  01/17/17   0714   WBC  7.1   RBC  4.45   HGB  14.0   HCT  39.9*   PLT  236            Failed - Uric acid greater than or equal to 6 on file in past 12 months    Recent Labs   Lab Test  01/17/17   0714   URIC  4.7     If level is 6mg/dL or greater, ok to refill one time and refer to provider.          Passed - ALT on file in past 12 months    Recent Labs   Lab Test  11/03/17   0848   ALT  71*            Passed - Recent (12 mo) or future (30 days) visit within the authorizing provider's specialty    Patient had office visit in the last 12 months or has a visit in the next 30 days with authorizing provider or within the authorizing provider's specialty.  See \"Patient Info\" tab in inbasket, or \"Choose Columns\" in Meds & Orders section of the refill encounter.           Passed - Patient is age 18 or older       Passed - Normal serum creatinine on file in the past 12 months    Recent Labs   Lab Test  11/03/17   0848   CR  1.02            Routing refill request to provider for review/approval because:  Drug not on the G, P or  Health refill protocol or controlled substance       fenofibrate 160 MG tablet [Pharmacy Med Name: " "FENOFIBRATE 160MG TABLETS]  Last Written Prescription Date:  03/28/2017  Last Fill Quantity: 90 tablet,  # refills: 2   Last office visit: 4/9/2018 with prescribing provider:  Gali Del Valle MD   Future Office Visit:   Next 5 appointments (look out 90 days)     May 29, 2018  2:00 PM CDT   Return Visit with Cindy Castañeda PsyD   [facility] [place] ([facility] [place])    [address] [place] [address] [phone]                  90 tablet 0     Sig: TAKE 1 TABLET(160 MG) BY MOUTH DAILY    Fibrates Passed    4/22/2018 12:04 PM       Passed - Lipid panel on file in past 12 months    Recent Labs   Lab Test  11/03/17   0848   06/13/14   0912   CHOL  178   < >  148   TRIG  202*   < >  314*   HDL  29*   < >  24*   LDL  109*   < >  61   NHDL  149*   < >   --    VLDL   --    --   63*   CHOLHDLRATIO   --    --   6.1*    < > = values in this interval not displayed.              Passed - No abnormal creatine kinase in past 12 months    Recent Labs   Lab Test  03/15/13   1522   CKT  178               Passed - Recent (12 mo) or future (30 days) visit within the authorizing provider's specialty    Patient had office visit in the last 12 months or has a visit in the next 30 days with authorizing provider or within the authorizing provider's specialty.  See \"Patient Info\" tab in inbasket, or \"Choose Columns\" in Meds & Orders section of the refill encounter.           Passed - Patient is age 18 or older          "

## 2018-04-26 RX ORDER — FENOFIBRATE 160 MG/1
TABLET ORAL
Qty: 90 TABLET | Refills: 3 | Status: SHIPPED | OUTPATIENT
Start: 2018-04-26 | End: 2019-02-12

## 2018-04-26 RX ORDER — ALLOPURINOL 300 MG/1
TABLET ORAL
Qty: 135 TABLET | Refills: 3 | Status: SHIPPED | OUTPATIENT
Start: 2018-04-26 | End: 2019-01-30

## 2018-04-27 NOTE — TELEPHONE ENCOUNTER
Ok to fill. Will recheck CBC and uric acid with next labs.    Gali Del Valle MD  Internal Medicine/Pediatrics

## 2018-05-08 PROBLEM — Z82.49 FAMILY HISTORY OF PREMATURE CORONARY HEART DISEASE: Status: ACTIVE | Noted: 2018-05-08

## 2018-05-21 ENCOUNTER — OFFICE VISIT (OUTPATIENT)
Dept: PSYCHOLOGY | Facility: CLINIC | Age: 38
End: 2018-05-21
Attending: INTERNAL MEDICINE
Payer: COMMERCIAL

## 2018-05-21 ENCOUNTER — FCC EXTENDED DOCUMENTATION (OUTPATIENT)
Dept: PSYCHOLOGY | Facility: CLINIC | Age: 38
End: 2018-05-21

## 2018-05-21 DIAGNOSIS — F32.1 MAJOR DEPRESSIVE DISORDER, SINGLE EPISODE, MODERATE (H): Primary | ICD-10-CM

## 2018-05-21 PROCEDURE — 90834 PSYTX W PT 45 MINUTES: CPT | Performed by: PSYCHOLOGIST

## 2018-05-21 ASSESSMENT — ANXIETY QUESTIONNAIRES
7. FEELING AFRAID AS IF SOMETHING AWFUL MIGHT HAPPEN: NOT AT ALL
3. WORRYING TOO MUCH ABOUT DIFFERENT THINGS: SEVERAL DAYS
2. NOT BEING ABLE TO STOP OR CONTROL WORRYING: NOT AT ALL
GAD7 TOTAL SCORE: 2
6. BECOMING EASILY ANNOYED OR IRRITABLE: MORE THAN HALF THE DAYS
2. NOT BEING ABLE TO STOP OR CONTROL WORRYING: NOT AT ALL
1. FEELING NERVOUS, ANXIOUS, OR ON EDGE: NOT AT ALL
GAD7 TOTAL SCORE: 4
6. BECOMING EASILY ANNOYED OR IRRITABLE: MORE THAN HALF THE DAYS
5. BEING SO RESTLESS THAT IT IS HARD TO SIT STILL: NOT AT ALL
GAD7 TOTAL SCORE: 4
GAD7 TOTAL SCORE: 4
7. FEELING AFRAID AS IF SOMETHING AWFUL MIGHT HAPPEN: NOT AT ALL
7. FEELING AFRAID AS IF SOMETHING AWFUL MIGHT HAPPEN: NOT AT ALL
1. FEELING NERVOUS, ANXIOUS, OR ON EDGE: NOT AT ALL
5. BEING SO RESTLESS THAT IT IS HARD TO SIT STILL: NOT AT ALL
4. TROUBLE RELAXING: SEVERAL DAYS
3. WORRYING TOO MUCH ABOUT DIFFERENT THINGS: NOT AT ALL

## 2018-05-21 ASSESSMENT — PATIENT HEALTH QUESTIONNAIRE - PHQ9
SUM OF ALL RESPONSES TO PHQ QUESTIONS 1-9: 7
5. POOR APPETITE OR OVEREATING: NOT AT ALL
SUM OF ALL RESPONSES TO PHQ QUESTIONS 1-9: 7
10. IF YOU CHECKED OFF ANY PROBLEMS, HOW DIFFICULT HAVE THESE PROBLEMS MADE IT FOR YOU TO DO YOUR WORK, TAKE CARE OF THINGS AT HOME, OR GET ALONG WITH OTHER PEOPLE: SOMEWHAT DIFFICULT

## 2018-05-21 NOTE — Clinical Note
Pearls Dr. Del Valle.   I saw the patient for a diagnostic assessment as part of the ADHD evaluation process. I have diagnosed him with Major Depressive Disorder, Single Episode, Mild. I will route the final results and diagnoses to you after the evaluation is completed.   Please let me know if you have any questions.   Cindy Castañeda PsyD

## 2018-05-21 NOTE — PROGRESS NOTES
Progress Note - Initial Session    Client Name:  Denny Herrera Date: 5/21/2018          Service Type: Individual      Session Start Time: 12:00PM  Session End Time: 12:45PM      Session Length: 38 - 52      Session #: 1     Attendees: Client attended alone         Diagnostic Assessment in progress.  Unable to complete documentation at the conclusion of the first session due to focusing on the client's history of depression.The client described symptoms of depression and attention difficulties. He said that he often procrastinates, has lack of follow through, and feels distracted. The client reported multiple surgeries on both of his knees and attributes his depression to his knee pain. He said that after one of his surgeries, he was required to be on bed rest for two months.       Mental Status Assessment:  Appearance:   Appropriate   Eye Contact:   Good   Psychomotor Behavior: Normal   Attitude:   Cooperative   Orientation:   All  Speech   Rate / Production: Normal    Volume:  Soft   Mood:    Normal  Affect:    Flat   Thought Content:  Clear   Thought Form:  Coherent  Logical   Insight:    Good       Safety Issues and Plan for Safety and Risk Management:  Client denies current fears or concerns for personal safety.  Client denies current or recent suicidal ideation or behaviors.  Client denies current or recent homicidal ideation or behaviors.  Client denies current or recent self injurious behavior or ideation.  Client denies other safety concerns.  A safety and risk management plan has not been developed at this time, however client was given the after-hours number / 911 should there be a change in any of these risk factors.  Client reports there are no firearms in the house.      Diagnostic Criteria:  A) Single episode - symptoms have been present during the same 2-week period and represent a change from previous functioning 5 or more symptoms (required for diagnosis)   - Depressed mood. Note:  "In children and adolescents, can be irritable mood.     - Diminished interest or pleasure in all, or almost all, activities.    - Psychomotor activity agitation.    - Fatigue or loss of energy.    - Feelings of worthlessness or inappropriate and excessive guilt.    - Diminished ability to think or concentrate, or indecisiveness.   B) The symptoms cause clinically significant distress or impairment in social, occupational, or other important areas of functioning  C) The episode is not attributable to the physiological effects of a substance or to another medical condition  D) The occurence of major depressive episode is not better explained by other thought / psychotic disorders  E) There has never been a manic episode or hypomanic episode        DSM5 Diagnoses: (Sustained by DSM5 Criteria Listed Above)  Diagnoses: 296.22 (F32.1)  Major Depressive Disorder, Single Episode, Moderate _  Psychosocial & Contextual Factors: The client reported depression that he attributes to his knee pain. He also said that he has \"only one friend.\" The client reported difficulty with attention.   WHODAS 2.0 (12 item)            This questionnaire asks about difficulties due to health conditions. Health conditions  include  disease or illnesses, other health problems that may be short or long lasting,  injuries, mental health or emotional problems, and problems with alcohol or drugs.                     Think back over the past 30 days and answer these questions, thinking about how much  difficulty you had doing the following activities. For each question, please [place] only  one response.    S1 Standing for long periods such as 30 minutes? Moderate =   3   S2 Taking care of household responsibilities? Moderate =   3   S3 Learning a new task, for example, learning how to get to a new place? None =         1   S4 How much of a problem do you have joining community activities (for example, festivals, Scientology or other activities) in the " same way as anyone else can? Severe =       4   S5 How much have you been emotionally affected by your health problems? Moderate =   3     In the past 30 days, how much difficulty did you have in:   S6 Concentrating on doing something for ten minutes? Moderate =   3   S7 Walking a long distance such as a kilometer (or equivalent)? Moderate =   3   S8 Washing your whole body? None =         1   S9 Getting dressed? None =         1   S10 Dealing with people you do not know? None =         1   S11 Maintaining a friendship? Mild =           2   S12 Your day to day work? Moderate =   3     H1 Overall, in the past 30 days, how many days were these difficulties present? Record number of days 15   H2 In the past 30 days, for how many days were you totally unable to carry out your usual activities or work because of any health condition? Record number of days  1   H3 In the past 30 days, not counting the days that you were totally unable, for how many days did you cut back or reduce your usual activities or work because of any health condition? Record number of days 20       Collateral Reports Completed:  Routed note to PCP      PLAN: (Homework, other):  Client stated that he plans to return for his next scheduled appointment. He also plans to bring his transcript and the completed [facility] questionnaires.     Cindy Castañeda PsyD LP 5/21/2018
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
## 2018-05-29 ENCOUNTER — OFFICE VISIT (OUTPATIENT)
Dept: PSYCHOLOGY | Facility: CLINIC | Age: 38
End: 2018-05-29
Attending: INTERNAL MEDICINE
Payer: COMMERCIAL

## 2018-05-29 DIAGNOSIS — F32.1 MAJOR DEPRESSIVE DISORDER, SINGLE EPISODE, MODERATE (H): Primary | ICD-10-CM

## 2018-05-29 PROCEDURE — 90791 PSYCH DIAGNOSTIC EVALUATION: CPT | Performed by: PSYCHOLOGIST

## 2018-05-29 ASSESSMENT — ANXIETY QUESTIONNAIRES
3. WORRYING TOO MUCH ABOUT DIFFERENT THINGS: SEVERAL DAYS
5. BEING SO RESTLESS THAT IT IS HARD TO SIT STILL: NOT AT ALL
1. FEELING NERVOUS, ANXIOUS, OR ON EDGE: NOT AT ALL
7. FEELING AFRAID AS IF SOMETHING AWFUL MIGHT HAPPEN: NOT AT ALL
6. BECOMING EASILY ANNOYED OR IRRITABLE: SEVERAL DAYS
GAD7 TOTAL SCORE: 3
2. NOT BEING ABLE TO STOP OR CONTROL WORRYING: NOT AT ALL
IF YOU CHECKED OFF ANY PROBLEMS ON THIS QUESTIONNAIRE, HOW DIFFICULT HAVE THESE PROBLEMS MADE IT FOR YOU TO DO YOUR WORK, TAKE CARE OF THINGS AT HOME, OR GET ALONG WITH OTHER PEOPLE: NOT DIFFICULT AT ALL

## 2018-05-29 ASSESSMENT — PATIENT HEALTH QUESTIONNAIRE - PHQ9: 5. POOR APPETITE OR OVEREATING: SEVERAL DAYS
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
## 2018-05-30 ENCOUNTER — TRANSFERRED RECORDS (OUTPATIENT)
Dept: HEALTH INFORMATION MANAGEMENT | Facility: CLINIC | Age: 38
End: 2018-05-30

## 2018-05-30 ASSESSMENT — PATIENT HEALTH QUESTIONNAIRE - PHQ9: SUM OF ALL RESPONSES TO PHQ QUESTIONS 1-9: 10

## 2018-05-30 ASSESSMENT — ANXIETY QUESTIONNAIRES: GAD7 TOTAL SCORE: 3

## 2018-06-04 ENCOUNTER — DOCUMENTATION ONLY (OUTPATIENT)
Dept: PSYCHOLOGY | Facility: CLINIC | Age: 38
End: 2018-06-04
Payer: COMMERCIAL

## 2018-06-04 DIAGNOSIS — F32.1 MAJOR DEPRESSIVE DISORDER, SINGLE EPISODE, MODERATE (H): Primary | ICD-10-CM

## 2018-06-04 PROCEDURE — 96101 HC PSYCHOLOGICAL TEST BY PSYCHOLOGIST/MD, PER HR: CPT | Performed by: PSYCHOLOGIST

## 2018-06-04 NOTE — PROGRESS NOTES
"  [facility]  ADHD Evaluation         Patient: Denny Herrera  YOB: 1980  MRN: [id number]  Date(s) of assessment:  Livier self-report and collateral measures scored and interpreted 06/04/2018      Assessment tools:      Livier Adult ADHD Rating Scale-IV: Self and Other Reports (BAARS-IV), Livier Functional Impairment Scale: Self and Other Reports (BFIS) and Liveir Deficits in Executive Functioning Scale: Self and Other Reports (BDEFS)    Assessment Results:      [place] Adult ADHD Rating Scale-IV: Self and Other Reports (BAARS-IV)  The BAARS-IV assesses for symptoms of ADHD that are experienced in one's daily life. This assessment measure includes self and collateral rating scales designed to provide information regarding current and childhood symptoms of ADHD including inattention, hyperactivity, and impulsivity. Self-report scores are reported as percentiles. Scores at the 76th-83rd percentile are considered marginal, scores at the 84th-92nd percentile are considered borderline, scores at the 93rd-95th percentile are considered mild, scores at the 96th-98th percentile are considered moderate, and those at the 99th percentile are considered severe. Collateral or \"other\" rating scales are reported as number of symptoms observed in comparison to those reported by the client. Norms and percentile scores are not available for collateral reports.     Current Symptoms Scale--Self Report:   Client completed the self-report inventory of current symptoms. The results indicate that the client's Total ADHD Score was 39 which places him in the 93rd percentile for overall ADHD symptoms. In addition, the client endorsed 6/9 (97th percentile) Inattention symptoms, 2/9 (88th percentile) Hyperactivity-Impulsivity symptoms, and 7/9 (97th percentile) Sluggish Cognitive Tempo symptoms. Client indicated that the reported symptoms have resulted in impaired functioning in school, home and work. Overall, " the results suggest the client is experiencing Mild ADHD symptoms.     Current Symptoms Scale--Other Report:  Client's wife completed the collateral report inventory of current symptoms. Based on the collateral contact's observation of symptoms, the client demonstrates 6/9 Inattention symptoms, 2/5 Hyperactivity symptoms, 0/4 Impulsivity symptoms, and 3/9 Sluggish Cognitive Tempo symptoms. The client's Total ADHD Score was 43. The collateral contact indicated the client demonstrates impaired functioning in home} The collateral- and self-report scores are not significantly different.     Childhood Symptoms Scale--Self-Report:  Client completed the self-report inventory of childhood symptoms. The results indicate that the client's Total ADHD Score was 31 which places him in the 51st-75th percentile for overall ADHD symptoms in childhood. In addition, the client endorsed 3/9 (85th percentile) Inattention symptoms and  0/9 (1-75th percentile) Hyperactivity-Impulsivity symptoms. Client indicated that the reported symptoms resulted in impaired functioning in no areas. Overall, the results suggest the client experienced little to Marginal symptoms of ADHD as a child.     Childhood Symptoms Scale--Other Report:  Client's mother completed the collateral report inventory of childhood symptoms. Based on the collateral contact's recollection of client's childhood symptoms, the client demonstrated 6/9 Inattention symptoms and 0/9 Hyperactivity-Impulsivity symptoms. The client's Total ADHD Score was 36. The collateral contact indicated the client demonstrates impaired functioning in schoolThe collateral- and self-report scores are not significantly different.                           Livier Functional Impairment Scale: Self and Other Reports (BFIS)  The BFIS is used to assess an individuals' psychosocial impairment in major life/daily activities that may be due to a mental health disorder. This assessment measure includes self  "and collateral rating scales. Self-report scores are reported as percentiles. Scores at the 76th-83rd percentile are considered marginal, scores at the 84th-92nd percentile are considered borderline, scores at the 93rd-95th percentile are considered mild, scores at the 96th-98th percentile are considered moderate, and those at the 99th percentile are considered severe.Collateral or \"other\" rating scales are reported as number of symptoms observed in comparison to those reported by the client. Norms and percentile scores are not available for collateral reports.     Results indicate the client identified impairment (scores at or greater than 93rd percentile) in the following areas: home-family, home-chores, work, money management, daily responsibilities and health maintenance The client's Mean Impairment Score was 5.13 (90th percentile) indicating the client is reporting Borderline impairment in functioning across domains. Client's wife completed the collateral rating scale, which indicated similar results. The collateral contact's scores were generally higher than the client's report.     Livier Deficits in Executive Functioning Scale (BDEFS)  The BDEFS is a measure used for evaluating dimensions of adult executive functioning in daily life.This assessment measure includes self and collateral rating scales. Self-report scores are reported as percentiles. Scores at the 76th-83rd percentile are considered marginal, scores at the 84th-92nd percentile are considered borderline, scores at the 93rd-95th percentile are considered mild, scores at the 96th-98th percentile are considered moderate, and those at the 99th percentile are considered severe.Collateral or \"other\" rating scales are reported as number of symptoms observed in comparison to those reported by the client. Norms and percentile scores are not available for collateral reports.     Results indicate the client's Total Executive Functioning Score was 205  " (93rd percentile). The ADHD-Executive Functioning Index score was 26 (94th Percentile). These scores suggest the client has Mild deficits in executive functioning. These deficits may be due to ADHD. Results indicate the client identified significant deficits in the following areas: self-management to time 99th Percentile , self-organization/problem-solving 93rd Percentile and self-motivation 94th Percentile. Client's wife completed the collateral rating scale, which indicated somewhat similar results. The collateral contact's scores were generally lower than the client's report.      Cindy Castñaeda PsyD LP 6/4/2018

## 2018-06-06 ENCOUNTER — THERAPY VISIT (OUTPATIENT)
Dept: PHYSICAL THERAPY | Facility: CLINIC | Age: 38
End: 2018-06-06
Payer: COMMERCIAL

## 2018-06-06 DIAGNOSIS — M54.2 CERVICAL PAIN: Primary | ICD-10-CM

## 2018-06-06 PROCEDURE — 97110 THERAPEUTIC EXERCISES: CPT | Mod: GP | Performed by: PHYSICAL THERAPIST

## 2018-06-06 PROCEDURE — 97112 NEUROMUSCULAR REEDUCATION: CPT | Mod: GP | Performed by: PHYSICAL THERAPIST

## 2018-06-06 PROCEDURE — 97161 PT EVAL LOW COMPLEX 20 MIN: CPT | Mod: GP | Performed by: PHYSICAL THERAPIST

## 2018-06-06 NOTE — PROGRESS NOTES
[facility] Initial Evaluation  Subjective:  Patient is a 37 year old male presenting with rehab cervical spine hpi. The history is provided by the patient.   Denny Herrera is a 37 year old male with a cervical spine (right cervical) condition.      This is a recurrent condition  Patient has chief complaint of right cervical and upper back pain which flared up after chiropractor treatment 2 days ago. He has had intermittent mid back and neck pain since 2005. He notes pain and tightness in full back and neck when wakes in the morning and pain with rotating to the right.He had some relief with ice and muscles relaxor medication. He has two more chiropractor appointments this week.     Goals:work at computer for work,hike and camp with kids.    Patient reports pain:  Thoracic right side, cervical right side and lower cervical spine.  Radiates to:  Hand right (numbness and tingling 4th & 5th digit-no pain).  Pain is described as aching and sharp and is constant and reported as 4/10.  Associated symptoms:  Loss of motion/stiffness, tingling and numbness. Pain is worse in the A.M. and worse in the P.M..  Symptoms are exacerbated by rotating head, certain positions, driving, looking up or down and lifting and relieved by NSAID's, ice and muscle relaxants.  Since onset symptoms are gradually improving.        General health as reported by patient is fair.                                              Objective:  Standing Alignment:    Cervical/Thoracic:  Forward head and thoracic kyphosis increased  Shoulder/UE:  Rounded shoulders                                  Cervical/Thoracic Evaluation    AROM:  AROM Cervical:    Flexion:            50% pain  Extension:       40% pain  Rotation:         Left: 40% pain     Right: 25% pain  Side Bend:      Left: 40% pain     Right:  20% pain  AROM Thoracic:    Flexion:               60%  Extension:          40%  Rotation:            Left: 50%     Right: 40%       Headaches: none  Cervical Myotomes:  normal                      Cervical Dermatomes:              C6 right:  Hypo-light touch    C7 right:  Hyper-light touch        Cervical Palpation:      Tenderness present at Right:    Rhomboids; Upper Trap; Levator and Facet        Cord Sign:  normal                                            General   Shoulder [id number] bilaterally  ROS    Assessment/Plan:    Patient is a 37 year old male with cervical and thoracic complaints.    Patient has the following significant findings with corresponding treatment plan.                Diagnosis 1:  upper back and neck pain  Pain -  hot/cold therapy and education  Decreased ROM/flexibility - manual therapy, therapeutic exercise and home program  Decreased strength - therapeutic exercise, therapeutic activities and home program  Impaired posture - neuro re-education and home program    Therapy Evaluation Codes:   1) History comprised of:   Personal factors that impact the plan of care:      None.    Comorbidity factors that impact the plan of care are:      None.     Medications impacting care: None.  2) Examination of Body Systems comprised of:   Body structures and functions that impact the plan of care:      Cervical spine and Thoracic Spine.   Activity limitations that impact the plan of care are:      Driving, Reading/Computer work and Sleeping.  3) Clinical presentation characteristics are:   Stable/Uncomplicated.  4) Decision-Making    Low complexity using standardized patient assessment instrument and/or measureable assessment of functional outcome.  Cumulative Therapy Evaluation is: Low complexity.    Previous and current functional limitations:  (See Goal Flow Sheet for this information)    Short term and Long term goals: (See Goal Flow Sheet for this information)     Communication ability:  Patient appears to be able to clearly communicate and understand verbal and written communication and follow directions  correctly.  Treatment Explanation - The following has been discussed with the patient:   RX ordered/plan of care  Anticipated outcomes  Possible risks and side effects  This patient would benefit from PT intervention to resume normal activities.   Rehab potential is good.    Frequency:  1 X week, once daily  Duration:  for 8 weeks  Discharge Plan:  Achieve all LTG.  Independent in home treatment program.  Reach maximal therapeutic benefit.    Please refer to the daily flowsheet for treatment today, total treatment time and time spent performing 1:1 timed codes.

## 2018-06-06 NOTE — PROGRESS NOTES
[place] [facility] Initial Evaluation  Subjective:  Patient is a 37 year old male presenting with rehab left ankle/foot hpi.                                      Pertinent medical history includes:  Depression, high blood pressure and osteoarthritis.    Other surgeries include:  Orthopedic surgery.  Current medications:  Anti-inflammatory, high blood pressure medication and muscle relaxants.  Current occupation is IT.    Primary job tasks include:  Prolonged sitting (Computer work).                                Objective:  System    Physical Exam    General     ROS    Assessment/Plan:

## 2018-06-11 ENCOUNTER — OFFICE VISIT (OUTPATIENT)
Dept: PSYCHOLOGY | Facility: CLINIC | Age: 38
End: 2018-06-11
Payer: COMMERCIAL

## 2018-06-11 DIAGNOSIS — F32.1 MAJOR DEPRESSIVE DISORDER, SINGLE EPISODE, MODERATE (H): Primary | ICD-10-CM

## 2018-06-11 PROCEDURE — 99207 ZZC NO CHARGE LOS: CPT | Performed by: PSYCHOLOGIST

## 2018-06-11 NOTE — PROGRESS NOTES
Client arrived to take the MMPI.  There were no reported difficulties with taking the test.  Client will receive results of the testing once the evaluation is completed.    Cinyd Castañeda PsyD LP 6/11/2018

## 2018-06-15 ENCOUNTER — THERAPY VISIT (OUTPATIENT)
Dept: PHYSICAL THERAPY | Facility: CLINIC | Age: 38
End: 2018-06-15
Payer: COMMERCIAL

## 2018-06-15 DIAGNOSIS — M54.2 CERVICAL PAIN: ICD-10-CM

## 2018-06-15 PROCEDURE — 97110 THERAPEUTIC EXERCISES: CPT | Mod: GP

## 2018-06-15 PROCEDURE — 97140 MANUAL THERAPY 1/> REGIONS: CPT | Mod: GP

## 2018-06-27 ENCOUNTER — FCC EXTENDED DOCUMENTATION (OUTPATIENT)
Dept: PSYCHOLOGY | Facility: CLINIC | Age: 38
End: 2018-06-27

## 2018-06-27 NOTE — PROGRESS NOTES
Summary of MMPI-2 Results     Client completed the [place] Multiphasic Personality Inventory-2 (MMPI-2), a self-report personality inventory, as a part of the psychological assessment for elvatuation of attention deficit disorders.  Validity scales indicate that the client responded in an inconsistent manner, resulting in an invalid profile.  His responses yielded a profile that is indicative of someone who engages in indiscriminately reporting false. For example, he answered false and then answered a question written in the opposite direction as false also.  His MMPI test results were not consistent with his report upon direct interview.         Cindy Castañeda PsyD LP 6/27/2018

## 2018-06-29 ENCOUNTER — THERAPY VISIT (OUTPATIENT)
Dept: PHYSICAL THERAPY | Facility: CLINIC | Age: 38
End: 2018-06-29
Payer: COMMERCIAL

## 2018-06-29 DIAGNOSIS — M54.2 CERVICAL PAIN: ICD-10-CM

## 2018-06-29 PROCEDURE — 97110 THERAPEUTIC EXERCISES: CPT | Mod: GP

## 2018-06-29 PROCEDURE — 97140 MANUAL THERAPY 1/> REGIONS: CPT | Mod: GP

## 2018-07-12 ENCOUNTER — THERAPY VISIT (OUTPATIENT)
Dept: PHYSICAL THERAPY | Facility: CLINIC | Age: 38
End: 2018-07-12
Payer: COMMERCIAL

## 2018-07-12 DIAGNOSIS — M54.2 CERVICAL PAIN: ICD-10-CM

## 2018-07-12 PROCEDURE — 97140 MANUAL THERAPY 1/> REGIONS: CPT | Mod: GP

## 2018-07-12 PROCEDURE — 97110 THERAPEUTIC EXERCISES: CPT | Mod: GP

## 2018-07-12 NOTE — PROGRESS NOTES
Subjective:  HPI                    Objective:  System    Physical Exam    General     ROS    Assessment/Plan:    DISCHARGE REPORT    Progress reporting period is from 6/13/2018 to 7/12/2018.       SUBJECTIVE  Subjective changes noted by patient:  States he is 95% back to normal.  He is able to drive and perform activities with his kids without pain.      Current pain level is: 0/10.     Previous pain level was: 4/10.   Changes in function:  Yes (See Goal flowsheet attached for changes in current functional level)  Adverse reaction to treatment or activity: None    OBJECTIVE  Changes noted in objective findings:  Yes,   Objective: CAROM flex-100, ext-100, SB R-100, L- 90, Rot R-95, L-100     ASSESSMENT/PLAN  Updated problem list and treatment plan: Diagnosis 1:  Cervical pain  Decreased ROM/flexibility - manual therapy and therapeutic exercise  Decreased strength - therapeutic exercise and therapeutic activities  STG/LTGs have been met or progress has been made towards goals:  Yes (See Goal flow sheet completed today.)  Assessment of Progress: The patient's condition is improving.  Self Management Plans:  Patient has been instructed in a home treatment program.    Denny continues to require the following intervention to meet STG and LTG's:  PT intervention is no longer required to meet STG/LTG.    Recommendations:  This patient is ready to be discharged from therapy and continue their home treatment program.    Please refer to the daily flowsheet for treatment today, total treatment time and time spent performing 1:1 timed codes.

## 2018-07-12 NOTE — MR AVS SNAPSHOT
After Visit Summary   7/12/2018    Denny Herrera    MRN: [id number]           Patient Information     Date Of Birth          1980        Visit Information        Provider Department      7/12/2018 12:40 PM Raysa Collins PTA [facility]        Today's Diagnoses     Cervical pain           Follow-ups after your visit        Your next 10 appointments already scheduled     Jul 30, 2018  2:30 PM CDT   Return Visit with Cindy Castañeda PsyD   Merged with [facility] [place] ([facility] [place]) [address]  [place] [address] [phone]              Who to contact     If you have questions or need follow up information about today's clinic visit or your schedule please contact [facility] directly at [phone].  Normal or non-critical lab and imaging results will be communicated to you by MyChart, letter or phone within 4 business days after the clinic has received the results. If you do not hear from us within 7 days, please contact the clinic through [employer] or phone. If you have a critical or abnormal lab result, we will notify you by phone as soon as possible.  Submit refill requests through [employer] or call your pharmacy and they will forward the refill request to us. Please allow 3 business days for your refill to be completed.          Additional Information About Your Visit        MyChart Information     [employer] gives you secure access to your electronic health record. If you see a primary care provider, you can also send messages to your care team and make appointments. If you have questions, please call your primary care clinic.  If you do not have a primary care provider, please call [phone] and they will assist you.        Care EveryWhere ID     This is your Care EveryWhere ID. This could be used by other organizations to access your [facility] medical records  [id number]         Blood Pressure from Last 3  Encounters:   04/09/18 134/76   03/05/18 140/88   11/03/17 124/90    Weight from Last 3 Encounters:   04/09/18 121 kg (266 lb 11.2 oz)   03/05/18 120.6 kg (265 lb 12.8 oz)   11/03/17 118.4 kg (261 lb)              We Performed the Following     Manual Ther Tech, 1+Regions, EA 15 min     Therapeutic Exercises        Primary Care Provider Office Phone # Fax #    Gali Del Valle [phone] [address] [place] [address]        Equal Access to Services     [facility]: Hadii aad ku hadasho Soomaali, waaxda luqadaha, qaybta kaalmada adecooperyada, hardeep mcneal . So [facility] [phone].    ATENCIÓN: Si habla español, tiene a tapia disposición servicios gratuitos de asistencia lingüística. [facility] [phone].    We comply with applicable federal civil rights laws and [place] laws. We do not discriminate on the basis of race, color, national origin, age, disability, sex, sexual orientation, or gender identity.            Thank you!     Thank you for choosing [facility] [place]  for your care. Our goal is always to provide you with excellent care. Hearing back from our patients is one way we can continue to improve our services. Please take a few minutes to complete the written survey that you may receive in the mail after your visit with us. Thank you!             Your Updated Medication List - Protect others around you: Learn how to safely use, store and throw away your medicines at www.disposemymeds.org.          This list is accurate as of 7/12/18  1:33 PM.  Always use your most recent med list.                   Brand Name Dispense Instructions for use Diagnosis    allopurinol 300 MG tablet    ZYLOPRIM    135 tablet    TAKE 1 TABLET BY MOUTH EVERY DAY AND 1/2 TABLET BY MOUTH EVERY EVENING    Podagra       cyclobenzaprine 10 MG tablet    FLEXERIL    90 tablet    Take 0.5-1 tablets (5-10 mg) by mouth 3 times daily as needed for  muscle spasms    Neck muscle spasm       fenofibrate 160 MG tablet     90 tablet    TAKE 1 TABLET(160 MG) BY MOUTH DAILY    Hypertriglyceridemia       lisinopril 10 MG tablet    PRINIVIL/ZESTRIL    30 tablet    Take 1 tablet (10 mg) by mouth daily    Benign essential hypertension       MIRALAX powder   Generic drug:  polyethylene glycol     510 g    Take 17 g (1 capful) by mouth daily    Status post osteotomy       Multi-vitamin Tabs tablet      Take 1 tablet by mouth daily        OMEGA-3 FISH OIL PO      Take 1 capsule by mouth daily        VITAMIN D (CHOLECALCIFEROL) PO      Take by mouth daily

## 2018-07-23 ENCOUNTER — OFFICE VISIT (OUTPATIENT)
Dept: URGENT CARE | Facility: URGENT CARE | Age: 38
End: 2018-07-23
Payer: COMMERCIAL

## 2018-07-23 ENCOUNTER — TELEPHONE (OUTPATIENT)
Dept: PEDIATRICS | Facility: CLINIC | Age: 38
End: 2018-07-23

## 2018-07-23 VITALS
HEART RATE: 82 BPM | WEIGHT: 266 LBS | TEMPERATURE: 98.9 F | DIASTOLIC BLOOD PRESSURE: 84 MMHG | SYSTOLIC BLOOD PRESSURE: 136 MMHG | OXYGEN SATURATION: 99 % | BODY MASS INDEX: 37.63 KG/M2

## 2018-07-23 DIAGNOSIS — B08.4 HAND, FOOT AND MOUTH DISEASE: Primary | ICD-10-CM

## 2018-07-23 PROCEDURE — 99213 OFFICE O/P EST LOW 20 MIN: CPT | Performed by: FAMILY MEDICINE

## 2018-07-23 NOTE — TELEPHONE ENCOUNTER
Patient calling because he has a severe pain on his tongue with swelling that began today.   Is able to swallow, and did eat dinner, but it was very painful on his tongue.   His tongue is rubbing against his teeth due to the swelling and this is causing discomfort.  Has a couple of blisters on his lips and hands.  His daughter was recently diagnosed with hand-foot and mouth.  Temp 99.7.  Notes that neck, upper back and shoulders are stiff.   Is able to move his neck without difficulty, and can touch his chin to his chest.    Developed a headache this afternoon.  Advised that patient be seen today due to degree of discomfort.   Patient is in agreement and will proceed to [facility] for evaluation.  No further questions.  Will call back if any other questions or concerns.  ABDON Low RN

## 2018-07-23 NOTE — MR AVS SNAPSHOT
After Visit Summary   7/23/2018    Denny Herrera    MRN: [id number]           Patient Information     Date Of Birth          1980        Visit Information        Provider Department      7/23/2018 7:10 PM Michelle Sotelo MD [facility]        Today's Diagnoses     Hand, foot and mouth disease    -  1      Care Instructions      Hand, Foot, and Mouth Disease (Child)    Hand, foot, and mouth disease (HFMD) is an illness caused by a virus. It is usually seen in young children. This virus causes small ulcers in the mouth (throat, lips, cheeks, gums, and tongue) and small blisters or red spots may appear on the palms (hands), diaper area, and soles of the feet. There is usually a low-grade fever and poor appetite. HFMD is not a serious illness and usually go away in 1 to 2 weeks. The painful sores in the mouth may prevent your child from eating and drinking.  It takes 3 to 5 days for the illness to appear in an exposed child. Generally, the HFMD is the most contagious during the first week of the illness. Sometimes, people can be contagious for days or weeks after the symptoms have disappeared.  HFMD can be transmitted from person to person by:    Touching your nose, mouth, eye after touching the stool of an infected person (has the virus)    Touching your nose, mouth, eye after touching fluid from the blisters/sores of an infected person    Respiratory secretions (sneezing, coughing, blowing your nose)    Touching contaminated objects (toys, doorknobs)    Oral secretions (kissing)  Home care  Mouth pain  Unless your healthcare provider has prescribed another medicine for mouth pain:    Acetaminophen or ibuprofen may be used for pain or discomfort or fever. Please consult your child's healthcare provider before giving your child acetaminophen or ibuprofen for dosing instructions and when to give the medicine (schedule).  Do not give ibuprofen to an infant 6 months of age or  younger. If your child has chronic liver or kidney disease or ever had a stomach ulcer or gastrointestinal bleeding, talk with your healthcare provider before using these medicines. Never give aspirin to anyone under 18 years of age who has a fever. It may cause severe disease (Reye Syndrome) or death. Talk to your child's healthcare provider before giving him or her over-the counter medicines.    Liquid rinses may be used in children over 12 months of age. Ask your child's healthcare provider for instructions.  Feeding  Follow a soft diet with plenty of fluids to prevent dehydration. If your child doesn't want to eat solid foods, it's OK for a few days, as long as he or she drinks lots of fluid. Cool drinks and frozen treats (sherbet) are soothing and easier to take. Avoid citrus juices (orange juice, lemonade, etc.) and salty or spicy foods. These may cause more pain in the mouth sores.  Return to  or school  Children may usually return to day care or school once the fever is gone and they are eating and drinking well. Contact your healthcare provider and ask when your child is able to return to  or school.  Follow up  Follow up with your child's healthcare provider, or as advised.  When to seek medical advice  Call your child's healthcare provider right away if any of these occur:    Your child complains of pain in the back of the neck    Your child has a severe headache or continued vomiting    Your child is having trouble breathing    Your child is drowsy or has trouble staying awake    Your child is having trouble swallowing    Mouth ulcers are present after 2 weeks    Your child's symptoms are getting worse    Your child appears to be dehydrated (dry mouth, no tears, haven' t urinated is 8 or more hours)    Your child has a fever (see Fever and children, below)  Call 911  Call 911 if any of these occur:    Unusual fussiness, drowsiness, or confusion    Severe headache or vomiting that  continues    Trouble breathing    Seizures  Fever and children  Always use a digital thermometer to check your child s temperature. Never use a mercury thermometer.  For infants and toddlers, be sure to use a rectal thermometer correctly. A rectal thermometer may accidentally poke a hole in (perforate) the rectum. It may also pass on germs from the stool. Always follow the product maker s directions for proper use. If you don t feel comfortable taking a rectal temperature, use another method. When you talk to your child s healthcare provider, tell him or her which method you used to take your child s temperature.  Here are guidelines for fever temperature. Ear temperatures aren t accurate before 6 months of age. Don t take an oral temperature until your child is at least 4 years old.  Infant under 3 months old:    Ask your child s healthcare provider how you should take the temperature.    Rectal or forehead (temporal artery) temperature of 100.4 F (38 C) or higher, or as directed by the provider    Armpit temperature of 99 F (37.2 C) or higher, or as directed by the provider  Child age 3 to 36 months:    Rectal, forehead (temporal artery), or ear temperature of 102 F (38.9 C) or higher, or as directed by the provider    Armpit temperature of 101 F (38.3 C) or higher, or as directed by the provider  Child of any age:    Repeated temperature of 104 F (40 C) or higher, or as directed by the provider    Fever that lasts more than 24 hours in a child under 2 years old. Or a fever that lasts for 3 days in a child 2 years or older.   Date Last Reviewed: 11/1/2017 2000-2017 [employer]. [address] [place] [address], [place] [address]. All rights reserved. This information is not intended as a substitute for professional medical care. Always follow your healthcare professional's instructions.                Follow-ups after your visit        Your next 10 appointments already scheduled     Jul 30, 2018  2:30 PM  CDT   Return Visit with Cindy Castañeda PsyD   [facility] ([facility] [place])    [address] [place] [address] [phone]              Who to contact     If you have questions or need follow up information about today's clinic visit or your schedule please contact [facility] directly at [phone].  Normal or non-critical lab and imaging results will be communicated to you by "[employer]"hart, letter or phone within 4 business days after the clinic has received the results. If you do not hear from us within 7 days, please contact the clinic through "[employer]"hart or phone. If you have a critical or abnormal lab result, we will notify you by phone as soon as possible.  Submit refill requests through [employer] or call your pharmacy and they will forward the refill request to us. Please allow 3 business days for your refill to be completed.          Additional Information About Your Visit        "[employer]u Information     [employer] gives you secure access to your electronic health record. If you see a primary care provider, you can also send messages to your care team and make appointments. If you have questions, please call your primary care clinic.  If you do not have a primary care provider, please call [phone] and they will assist you.        Care EveryWhere ID     This is your Care EveryWhere ID. This could be used by other organizations to access your [facility] medical records  [id number]        Your Vitals Were     Pulse Temperature Pulse Oximetry BMI (Body Mass Index)          82 98.9  F (37.2  C) (Oral) 99% 37.63 kg/m2         Blood Pressure from Last 3 Encounters:   07/23/18 136/84   04/09/18 134/76   03/05/18 140/88    Weight from Last 3 Encounters:   07/23/18 266 lb (120.7 kg)   04/09/18 266 lb 11.2 oz (121 kg)   03/05/18 265 lb 12.8 oz (120.6 kg)              Today, you had the following     No orders found for display       Primary Care Provider Office Phone # Fax #     Gali Del Valle [phone] [address] [place] [address]        Equal Access to Services     VENKAT HARTMAN : Haddamian andrea kim maryana Noonan, wabijalda norrisblueha, qasophyta kalienda maite, hardeep finley. So [facility] [phone].    ATENCIÓN: Si habla español, tiene a tapia disposición servicios gratuitos de asistencia lingüística. Llame al [phone].    We comply with applicable federal civil rights laws and [place] laws. We do not discriminate on the basis of race, color, national origin, age, disability, sex, sexual orientation, or gender identity.            Thank you!     Thank you for choosing [facility]  for your care. Our goal is always to provide you with excellent care. Hearing back from our patients is one way we can continue to improve our services. Please take a few minutes to complete the written survey that you may receive in the mail after your visit with us. Thank you!             Your Updated Medication List - Protect others around you: Learn how to safely use, store and throw away your medicines at www.disposemymeds.org.          This list is accurate as of 7/23/18  7:40 PM.  Always use your most recent med list.                   Brand Name Dispense Instructions for use Diagnosis    allopurinol 300 MG tablet    ZYLOPRIM    135 tablet    TAKE 1 TABLET BY MOUTH EVERY DAY AND 1/2 TABLET BY MOUTH EVERY EVENING    Podagra       cyclobenzaprine 10 MG tablet    FLEXERIL    90 tablet    Take 0.5-1 tablets (5-10 mg) by mouth 3 times daily as needed for muscle spasms    Neck muscle spasm       fenofibrate 160 MG tablet     90 tablet    TAKE 1 TABLET(160 MG) BY MOUTH DAILY    Hypertriglyceridemia       lisinopril 10 MG tablet    PRINIVIL/ZESTRIL    30 tablet    Take 1 tablet (10 mg) by mouth daily    Benign essential hypertension       MIRALAX powder   Generic drug:  polyethylene glycol     510 g    Take 17 g (1 capful)  by mouth daily    Status post osteotomy       Multi-vitamin Tabs tablet      Take 1 tablet by mouth daily        OMEGA-3 FISH OIL PO      Take 1 capsule by mouth daily        VITAMIN D (CHOLECALCIFEROL) PO      Take by mouth daily

## 2018-07-23 NOTE — LETTER
[facility]  [address]  [facility] [address]  [phone]      July 23, 2018    RE:  Denny RITA Carpenteres                                                                                                                                                       [address] [facility] [address]            To whom it may concern:    Denny Herrera is under my professional care for Hand, foot and mouth disease.   He  may return to work with the following: No restrictions when fevers, muscle pains, and sores of the skin and mouth are resolved          Sincerely,        Michelle Sotelo MD    [facility]

## 2018-07-24 NOTE — PROGRESS NOTES
SUBJECTIVE:  Chief Complaint   Patient presents with     Urgent Care     hand foot mouth at home      Mouth/Lip Problem     sores in mouth, fever, stiff neck and back pain      Denny Herrera is a 38 year old male who presents with a chief complaint of back pain, mouth pain, and sores on hands . It started 1 day(s) ago. Symptoms are sudden onset, still present and constant and moderate    Associated symptoms:    Fever: tactile fevers, sweats and chills,  Myalgias, worsening, especially his back    ENT: mouth sores on the tongue / pharynx    Chest:none- no respiratory symptoms    GIdecreased appetite  Recent illnesses: none  Today notices red bumps on fingers     Has   been exposed to hand foot mouth disease recently-  His wife and daughter had the disease recently    Past Medical History:   Diagnosis Date     Ankle joint pain 11/9/2012     Blood in semen      CARDIOVASCULAR SCREENING; LDL GOAL LESS THAN 160 6/20/2011     Elevated blood pressure reading without diagnosis of hypertension      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     Gout      Hepatic steatosis 3/14/2014     Hypertension      Hypertriglyceridaemia      Hypertriglyceridemia 2/8/2013     Obesity 2/8/2013     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     Raynaud disease      Raynaud's syndrome 3/14/2014     Patient Active Problem List   Diagnosis     CARDIOVASCULAR SCREENING; LDL GOAL LESS THAN 160     Hypertriglyceridemia     Raynaud's syndrome     Hepatic steatosis     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     Liver lesion - needs f/u 2/2015     Knee pain     Acute pain of right knee     Status post surgery     Obesity (H)     Benign essential hypertension     Family history of premature coronary heart disease       ALLERGIES:  No known drug allergies      Current Outpatient Prescriptions on File Prior to Visit:  allopurinol (ZYLOPRIM) 300 MG tablet TAKE 1 TABLET BY MOUTH EVERY DAY AND 1/2 TABLET BY MOUTH EVERY EVENING   cyclobenzaprine (FLEXERIL) 10 MG tablet Take 0.5-1 tablets (5-10  mg) by mouth 3 times daily as needed for muscle spasms   fenofibrate 160 MG tablet TAKE 1 TABLET(160 MG) BY MOUTH DAILY   lisinopril (PRINIVIL/ZESTRIL) 10 MG tablet Take 1 tablet (10 mg) by mouth daily   multivitamin, therapeutic with minerals (MULTI-VITAMIN) TABS Take 1 tablet by mouth daily   Omega-3 Fatty Acids (OMEGA-3 FISH OIL PO) Take 1 capsule by mouth daily   polyethylene glycol (MIRALAX) powder Take 17 g (1 capful) by mouth daily   VITAMIN D, CHOLECALCIFEROL, PO Take by mouth daily     No current facility-administered medications on file prior to visit.     Social History   Substance Use Topics     Smoking status: Never Smoker     Smokeless tobacco: Never Used     Alcohol use 0.0 - 1.0 oz/week     0 - 2 Standard drinks or equivalent per week      Comment:  rarely (up to 4 beers a month)       Family History   Problem Relation Age of Onset     Family History Negative Other      neg for RA, IBD, psoriasis, does not know much about his father side of his family, neg for gout     Attention Deficit Disorder Other      Arthritis Mother      back DJD     Alcohol/Drug Mother      Depression Mother      Psychotic Disorder Mother      Endocrine Disease Mother      Unknown/Adopted Mother      back issues     Anxiety Disorder Mother      Arthritis Brother      back DJD     HEART DISEASE Brother      MI at 40     Hypertension Brother      Bipolar Disorder Brother      Arthritis Maternal Grandmother      back DJD     Circulatory Maternal Grandmother      Eye Disorder Maternal Grandmother      Cancer Maternal Aunt      ?type     Hypertension Brother      Circulatory Maternal Grandfather      Eye Disorder Maternal Grandfather      Hypertension Other      maternal grandparents     Depression Other      Alcohol/Drug Brother      Unknown/Adopted Brother      back issues     Unknown/Adopted Other      maternal grandparents - back issues     HEART DISEASE Father      Unknown/Adopted Father      HEART DISEASE Paternal  Grandmother        ROS:  CONSTITUTIONAL:NEGATIVE for fever, chills, change in weight  INTEGUMENTARY/SKIN: NEGATIVE for worrisome rashes, moles or lesions  EYES: NEGATIVE for vision changes or irritation  ENT/MOUTH: NEGATIVE for ear, mouth and throat problems  RESP:NEGATIVE for significant cough or SOB    OBJECTIVE:  /84  Pulse 82  Temp 98.9  F (37.2  C) (Oral)  Wt 266 lb (120.7 kg)  SpO2 99%  BMI 37.63 kg/m2  GENERAL: Alert , fatigued, mild distress.    SKIN:  Few erythematous papules on bilateral hand(s),   *   HEAD: The head is normocephalic.   EYES: conjunctivae and cornea normal.without erythema or discharge  EARS: The canals are clear, tympanic membranes normal with no erythema/effusion.  NOSE: Clear, no discharge or congestion:  THROAT: moist mucous membranes, no erythema of pharynx.    Has erythematous sores on the soft palate/ uvula.  Sores on sides of the tongue  NECK: The neck is supple, no masses or significant adenopathy noted  LUNGS: clear to auscultation, no rales, rhonchi, wheezing or retractions  CV: regular rate and rhythm. S1 and S2 are normal. No murmurs.  ABDOMEN:  Abdomen soft, non-tender, non-distended, no masses. bowel sound normal    ASSESSMENT;  Hand, foot and mouth disease      We discussed that hand foot mouth disease can cause significant fevers and malaise, but usually is not dangerous.   The illness usually lasts 2-7 days and can easily spread    Symptomatic treatment with acetaminophen/ ibuprofen  Return to  if worsening  Rarely  would require emergency treatment if vomiting and unable to keep down fluids or if the illness causes extreme lethargy      Note for work

## 2018-07-24 NOTE — PATIENT INSTRUCTIONS
Hand, Foot, and Mouth Disease (Child)    Hand, foot, and mouth disease (HFMD) is an illness caused by a virus. It is usually seen in young children. This virus causes small ulcers in the mouth (throat, lips, cheeks, gums, and tongue) and small blisters or red spots may appear on the palms (hands), diaper area, and soles of the feet. There is usually a low-grade fever and poor appetite. HFMD is not a serious illness and usually go away in 1 to 2 weeks. The painful sores in the mouth may prevent your child from eating and drinking.  It takes 3 to 5 days for the illness to appear in an exposed child. Generally, the HFMD is the most contagious during the first week of the illness. Sometimes, people can be contagious for days or weeks after the symptoms have disappeared.  HFMD can be transmitted from person to person by:    Touching your nose, mouth, eye after touching the stool of an infected person (has the virus)    Touching your nose, mouth, eye after touching fluid from the blisters/sores of an infected person    Respiratory secretions (sneezing, coughing, blowing your nose)    Touching contaminated objects (toys, doorknobs)    Oral secretions (kissing)  Home care  Mouth pain  Unless your healthcare provider has prescribed another medicine for mouth pain:    Acetaminophen or ibuprofen may be used for pain or discomfort or fever. Please consult your child's healthcare provider before giving your child acetaminophen or ibuprofen for dosing instructions and when to give the medicine (schedule).  Do not give ibuprofen to an infant 6 months of age or younger. If your child has chronic liver or kidney disease or ever had a stomach ulcer or gastrointestinal bleeding, talk with your healthcare provider before using these medicines. Never give aspirin to anyone under 18 years of age who has a fever. It may cause severe disease (Reye Syndrome) or death. Talk to your child's healthcare provider before giving him or her  over-the counter medicines.    Liquid rinses may be used in children over 12 months of age. Ask your child's healthcare provider for instructions.  Feeding  Follow a soft diet with plenty of fluids to prevent dehydration. If your child doesn't want to eat solid foods, it's OK for a few days, as long as he or she drinks lots of fluid. Cool drinks and frozen treats (sherbet) are soothing and easier to take. Avoid citrus juices (orange juice, lemonade, etc.) and salty or spicy foods. These may cause more pain in the mouth sores.  Return to  or school  Children may usually return to day care or school once the fever is gone and they are eating and drinking well. Contact your healthcare provider and ask when your child is able to return to  or school.  Follow up  Follow up with your child's healthcare provider, or as advised.  When to seek medical advice  Call your child's healthcare provider right away if any of these occur:    Your child complains of pain in the back of the neck    Your child has a severe headache or continued vomiting    Your child is having trouble breathing    Your child is drowsy or has trouble staying awake    Your child is having trouble swallowing    Mouth ulcers are present after 2 weeks    Your child's symptoms are getting worse    Your child appears to be dehydrated (dry mouth, no tears, haven' t urinated is 8 or more hours)    Your child has a fever (see Fever and children, below)  Call 911  Call 911 if any of these occur:    Unusual fussiness, drowsiness, or confusion    Severe headache or vomiting that continues    Trouble breathing    Seizures  Fever and children  Always use a digital thermometer to check your child s temperature. Never use a mercury thermometer.  For infants and toddlers, be sure to use a rectal thermometer correctly. A rectal thermometer may accidentally poke a hole in (perforate) the rectum. It may also pass on germs from the stool. Always follow the  product maker s directions for proper use. If you don t feel comfortable taking a rectal temperature, use another method. When you talk to your child s healthcare provider, tell him or her which method you used to take your child s temperature.  Here are guidelines for fever temperature. Ear temperatures aren t accurate before 6 months of age. Don t take an oral temperature until your child is at least 4 years old.  Infant under 3 months old:    Ask your child s healthcare provider how you should take the temperature.    Rectal or forehead (temporal artery) temperature of 100.4 F (38 C) or higher, or as directed by the provider    Armpit temperature of 99 F (37.2 C) or higher, or as directed by the provider  Child age 3 to 36 months:    Rectal, forehead (temporal artery), or ear temperature of 102 F (38.9 C) or higher, or as directed by the provider    Armpit temperature of 101 F (38.3 C) or higher, or as directed by the provider  Child of any age:    Repeated temperature of 104 F (40 C) or higher, or as directed by the provider    Fever that lasts more than 24 hours in a child under 2 years old. Or a fever that lasts for 3 days in a child 2 years or older.   Date Last Reviewed: 11/1/2017 2000-2017 [employer]. [address] [place] [address], [place] [address]. All rights reserved. This information is not intended as a substitute for professional medical care. Always follow your healthcare professional's instructions.

## 2018-07-25 ENCOUNTER — TELEPHONE (OUTPATIENT)
Dept: PEDIATRICS | Facility: CLINIC | Age: 38
End: 2018-07-25

## 2018-07-25 DIAGNOSIS — K14.6 TONGUE PAIN: ICD-10-CM

## 2018-07-25 DIAGNOSIS — B08.4 HAND, FOOT AND MOUTH DISEASE: Primary | ICD-10-CM

## 2018-07-25 NOTE — TELEPHONE ENCOUNTER
"Patient was seen in Urgent care on 07/23 and diagnosed with Hand foot and mouth.  Tongue is slightly more swollen than it was two days ago.   Edges of the tongue are raw.  Sides of his tongue, on posterior tongue are white in color.  Is not able to eat solids due to the discomfort.  Is able to drink water, but this is painful also.     Has taken 1000 mg Tylenol this am with no relief.  Tried Orajel and an OTC \"numbing spray\", but this didn't work to decrease the pain.  No fever.   Temp 98.2.  Lymph nodes in neck are swollen and tender.  One blister on his toe, and blister on his hand.  Has been urinating.  No dizziness or lightheadedness noted.  ABDON Low RN        "

## 2018-07-25 NOTE — TELEPHONE ENCOUNTER
I spoke with Jose, and they are unable to have this ready until after 1500 at the earliest.  I called our pharmacy and they have the ingredients to make this formulation.  Order is pended-please review and sign.  They will call patient when this is ready, and they will prepare this when they get the order-so patient will not have to wait.  Patient notified.  ABDON Low RN

## 2018-07-25 NOTE — TELEPHONE ENCOUNTER
Could you call [facility] and see what formula's of magic mouthwash they have available. I would like one that has viscous lidocaine and nystatin in it (diphenhydramine, maalox may also help and would be ok). The only option in our  with nystatin does not have lidocaine.     Thanks,  Gali Del Valle MD  Internal Medicine/Pediatrics

## 2018-07-30 ENCOUNTER — OFFICE VISIT (OUTPATIENT)
Dept: PSYCHOLOGY | Facility: CLINIC | Age: 38
End: 2018-07-30
Payer: COMMERCIAL

## 2018-07-30 DIAGNOSIS — F32.1 MAJOR DEPRESSIVE DISORDER, SINGLE EPISODE, MODERATE (H): Primary | ICD-10-CM

## 2018-07-30 PROCEDURE — 99207 ZZC NO CHARGE LOS: CPT | Performed by: PSYCHOLOGIST

## 2018-07-30 NOTE — PROGRESS NOTES
Client arrived to take the WAIS.  There were no reported difficulties with taking the test.  Client will receive results of the testing once the evaluation is completed. He tested from 2:33 to 4:03.     Cindy Castañeda PsyD LP 7/30/2018

## 2018-08-06 ENCOUNTER — DOCUMENTATION ONLY (OUTPATIENT)
Dept: PSYCHOLOGY | Facility: CLINIC | Age: 38
End: 2018-08-06
Payer: COMMERCIAL

## 2018-08-06 DIAGNOSIS — F32.1 MAJOR DEPRESSIVE DISORDER, SINGLE EPISODE, MODERATE (H): Primary | ICD-10-CM

## 2018-08-06 PROCEDURE — 96101: CPT | Performed by: PSYCHOLOGIST

## 2018-08-06 NOTE — PROGRESS NOTES
[facility]  ADHD Evaluation         Patient: Denny Herrera  YOB: 1980  MRN: [id number]  Date(s) of assessment:  WAIS 07/30/2018    Wechsler Adult Intelligence Scale--Fourth Edition (WAIS-IV)  Client s intellectual functioning was assessed using the WAIS-IV. This measure provides a Full Scale Score, as well as several index scores measuring specific areas of cognitive ability. Index scores are reported using standard scores which have a mean of 100 and a standard deviation of 15. Subtest scores are reported using scaled scores that have a mean of 10 and a standard deviation of 2. Client s scores are reported at the 95 percent confidence interval, which indicates that there is a 95 percent chance that his true score falls within the stated range.  Results indicate that the FSIQ, a measure of overall intelligence, is the best descriptor of the client s functioning. Client s score on the Verbal Comprehension Index (VCI), a measure of verbal concept formation, verbal reasoning, and acquired knowledge, was in the Superior range (95% chance 115-127; 93rd percentile). Client s score on the Perceptual Reasoning Index (RHYS), a measure of perceptual and fluid reasoning, spatial processing, and visual-motor integration, was in the High Average range (95% chance 112-124; 90th percentile). Client scored in the High Average range (95% chance 106-120; 82nd percentile) on the Working Memory Index (WMI), a measure of ability to retain information in memory, perform some operation or manipulation, and produce a result. Client s score on the Processing Speed Index (PSI), a measure of short-term visual memory, attention, and visual-motor coordination was in the High Average range (95% chance 104-121; 82nd percentile).     Summary of WAIS-IV Index Scores   Index  Score Percentile Rank Confidence  Interval* Qualitative Description   Verbal Comprehension Index (VCI) 122 93 115-127 Superior   Perceptual  Reasoning Index (RHYS) 119 90 112-124 High Average   Working Memory Index (WMI) 114 82 106-120 High Average   Processing Speed Index (PSI) 114 82 104-121 High Average   Full Scale IQ (FSIQ) 122 93 117-126 Superior     Summary of WAIS-IV Scaled Subtest Scores  Verbal Comprehension Perceptual Reasoning   Similarities 16 Block Design 13   Vocabulary 12 Matrix Reasoning 13   Information 14 Visual Puzzles 14   Working Memory Processing Speed   Digit Span 11 Coding 13   Arithmetic 14 Symbol Search 12   *Confidence intervals at 95th percentile        Cindy Castañeda PsyD LP 8/7/2018

## 2018-08-13 ENCOUNTER — FCC EXTENDED DOCUMENTATION (OUTPATIENT)
Dept: PSYCHOLOGY | Facility: CLINIC | Age: 38
End: 2018-08-13
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
## 2018-08-15 ENCOUNTER — OFFICE VISIT (OUTPATIENT)
Dept: PSYCHOLOGY | Facility: CLINIC | Age: 38
End: 2018-08-15
Payer: COMMERCIAL

## 2018-08-15 ENCOUNTER — DOCUMENTATION ONLY (OUTPATIENT)
Dept: PSYCHOLOGY | Facility: CLINIC | Age: 38
End: 2018-08-15
Payer: COMMERCIAL

## 2018-08-15 DIAGNOSIS — F32.0 MAJOR DEPRESSIVE DISORDER, SINGLE EPISODE, MILD (H): Primary | ICD-10-CM

## 2018-08-15 PROCEDURE — 90834 PSYTX W PT 45 MINUTES: CPT | Performed by: PSYCHOLOGIST

## 2018-08-15 PROCEDURE — 96101 HC PSYCHOLOGICAL TEST BY PSYCHOLOGIST/MD, PER HR: CPT | Performed by: PSYCHOLOGIST

## 2018-08-15 ASSESSMENT — ANXIETY QUESTIONNAIRES
1. FEELING NERVOUS, ANXIOUS, OR ON EDGE: NOT AT ALL
6. BECOMING EASILY ANNOYED OR IRRITABLE: SEVERAL DAYS
2. NOT BEING ABLE TO STOP OR CONTROL WORRYING: NOT AT ALL
IF YOU CHECKED OFF ANY PROBLEMS ON THIS QUESTIONNAIRE, HOW DIFFICULT HAVE THESE PROBLEMS MADE IT FOR YOU TO DO YOUR WORK, TAKE CARE OF THINGS AT HOME, OR GET ALONG WITH OTHER PEOPLE: NOT DIFFICULT AT ALL
5. BEING SO RESTLESS THAT IT IS HARD TO SIT STILL: NOT AT ALL
GAD7 TOTAL SCORE: 3
3. WORRYING TOO MUCH ABOUT DIFFERENT THINGS: SEVERAL DAYS
7. FEELING AFRAID AS IF SOMETHING AWFUL MIGHT HAPPEN: NOT AT ALL

## 2018-08-15 ASSESSMENT — PATIENT HEALTH QUESTIONNAIRE - PHQ9: 5. POOR APPETITE OR OVEREATING: SEVERAL DAYS

## 2018-08-15 NOTE — PROGRESS NOTES
"                                             Progress Note    Client Name: Denny Herrera  Date: 8/15/2018          Service Type: Individual (ADHD Evaluation feedback session)      Session Start Time: 2:00PM Session End Time: 2:52PM      Session Length: 52 minutes     Session #: (feedback)     Attendees: Client attended alone      PHQ-9 / JAMIE-7 : 5/3     DATA           Episode of Care Goals: Achieved / completed to satisfaction - ACTION (Actively working towards change); Intervened by reinforcing change plan / affirming steps taken     Current / Ongoing Stressors and Concerns:   The client struggles with task completion and distractions. He reported that it is nearly impossible for him to recall names. 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      Treatment Objective(s) Addressed in This Session:   Provided feedback on ADHD evaluation. Reviewed test results in depth and answered client's questions. Client diagnosed with Major Depressive Disorder, Single Episode, Mild.This provider also completed full written report of evaluation, including integration of testing data, summary, and recommendations. Please see Documentation Only dated 8/15/2018.     Intervention:   ADHD Evaluation feedback; Reviewed report (can be found in Documentation Only encounter dated 8/15/2018); Client stated that he did not agree with the feedback. He reported that he has issues with his memory and trouble paying attention. We talked about how he might have an issue with sluggish cognitive tempo, but that that is not diagnosable at this time. We also talked about how depression and feeling overwhelmed can impact our ability to focus. At the end of the session, he reported that the information made sense and that he is glad that he has a direction to go now.         ASSESSMENT: Current Emotional / Mental Status (status of significant " symptoms):   Risk status (Self / Other harm or suicidal ideation)   Client denies current fears or concerns for personal safety.   Client denies current or recent suicidal ideation or behaviors.   Client denies current or recent homicidal ideation or behaviors.   Client denies current or recent self injurious behavior or ideation.   Client denies other safety concerns.   Client Client reports there has been no change in risk factors since their last session.     Client Client reports there has been no change in protective factors since their last session.     A safety and risk management plan has not been developed at this time, however client was given the after-hours number / 911 should there be a change in any of these risk factors.     Appearance:   Appropriate    Eye Contact:   Good    Psychomotor Behavior: Normal    Attitude:   Cooperative    Orientation:   All   Speech    Rate / Production: Normal     Volume:  Normal    Mood:    Normal   Affect:    Appropriate    Thought Content:  Clear    Thought Form:  Coherent  Logical    Insight:    Good      Medication Review:   No current psychiatric medications prescribed     Medication Compliance:   Yes to physical health medications     Changes in Health Issues:   Yes: he reported less back and neck pain     Chemical Use Review:   Substance Use: Chemical use reviewed, no active concerns identified      Tobacco Use: No current tobacco use.       Collateral Reports Completed:   Routed note to PCP    PLAN: (Client Tasks / Therapist Tasks / Other)  The client plans to repeat names when he is in a conversation with someone, schedule alone time at home, sit in silence on his commute home three times a week, and read the book Quiet.     My other formal recommendations are:  1. Schedule an appointment with your physician to discuss a medication evaluation.  2. Access resources through websites, books, and articles such as those provided  in the handout.  3. Individual  therapy is recommended for the treatment of depression. Therapies focusing on identifying and challenging problematic thought processes can be beneficial.  4. Schedule a follow-up appointment with me in about six weeks to review symptoms, treatment involvement, and struggles and/or successes.      I plan to see him in 6 weeks.         Cindy Castañeda PsyD LP 8/15/2018
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
## 2018-08-15 NOTE — Clinical Note
"Rafael Del Valle.   I saw the patient today and provided him with the results of his ADHD evaluation. I explained that while he was reporting some inattention symptoms, he does not currently quality for an ADD diagnosis. We talked about strategies that he can use when he does not feel like his memory is working. I also shared with him that he might have an issue with \"sluggish cognitive tempo,\" which is not currently a formal diagnosis but may be in the future.   We also talked about how medicating his depression could help with his motivation, energy, and interest. I explained that sometimes anti-depressants can also appear to help with inattention.   His formal report is the Doc Only encounter dated for today.   Please let me know if you have any questions. Cindy Castañeda PsyD"

## 2018-08-16 ASSESSMENT — PATIENT HEALTH QUESTIONNAIRE - PHQ9: SUM OF ALL RESPONSES TO PHQ QUESTIONS 1-9: 5

## 2018-08-16 ASSESSMENT — ANXIETY QUESTIONNAIRES: GAD7 TOTAL SCORE: 3

## 2018-09-07 ENCOUNTER — TELEPHONE (OUTPATIENT)
Dept: PEDIATRICS | Facility: CLINIC | Age: 38
End: 2018-09-07

## 2018-09-07 DIAGNOSIS — M54.12 CERVICAL RADICULOPATHY: Primary | ICD-10-CM

## 2018-09-07 NOTE — TELEPHONE ENCOUNTER
Referral placed for medical spine evaluation for neck pain. He will be contacted to set up an appointment. Please let patient know and find out if there were specific symptoms he was concerned about prior to this evaluation - see comment below.    Gali Del Valle MD  Internal Medicine/Pediatrics

## 2018-09-07 NOTE — TELEPHONE ENCOUNTER
Called and relayed the referral information to the Pt.   He has a follow up scheduled with PCP on Monday. States muscle relaxer's are helping him rest right now.   Reports he will call into the clinic sooner if anything changes.     Jen Falcon RN -- [facility]

## 2018-09-07 NOTE — TELEPHONE ENCOUNTER
Reason for call:  Order   Order or referral being requested: Spine Referral  Reason for request: Neck, Rt Shoulder Pain  Date needed: as soon as possible  Has the patient been seen by the PCP for this problem? YES    Additional comments: Please call Patient, to discuss symptoms    Phone number to reach patient:  Home number on file [phone] (home)    Best Time:  Anytime    Can we leave a detailed message on this number?  YES

## 2018-09-10 ENCOUNTER — OFFICE VISIT (OUTPATIENT)
Dept: PEDIATRICS | Facility: CLINIC | Age: 38
End: 2018-09-10
Payer: COMMERCIAL

## 2018-09-10 ENCOUNTER — OFFICE VISIT (OUTPATIENT)
Dept: NEUROSURGERY | Facility: CLINIC | Age: 38
End: 2018-09-10
Attending: NEUROLOGICAL SURGERY
Payer: COMMERCIAL

## 2018-09-10 VITALS
BODY MASS INDEX: 37.93 KG/M2 | DIASTOLIC BLOOD PRESSURE: 90 MMHG | HEART RATE: 81 BPM | OXYGEN SATURATION: 97 % | WEIGHT: 270.9 LBS | HEIGHT: 71 IN | TEMPERATURE: 98 F | SYSTOLIC BLOOD PRESSURE: 134 MMHG

## 2018-09-10 VITALS
HEART RATE: 82 BPM | TEMPERATURE: 97.6 F | DIASTOLIC BLOOD PRESSURE: 93 MMHG | OXYGEN SATURATION: 98 % | SYSTOLIC BLOOD PRESSURE: 144 MMHG

## 2018-09-10 DIAGNOSIS — Z82.49 FAMILY HISTORY OF PREMATURE CORONARY HEART DISEASE: ICD-10-CM

## 2018-09-10 DIAGNOSIS — F32.0 MILD MAJOR DEPRESSION (H): ICD-10-CM

## 2018-09-10 DIAGNOSIS — F90.0 ATTENTION DEFICIT HYPERACTIVITY DISORDER (ADHD), PREDOMINANTLY INATTENTIVE TYPE: Primary | ICD-10-CM

## 2018-09-10 DIAGNOSIS — E66.01 MORBID OBESITY (H): ICD-10-CM

## 2018-09-10 DIAGNOSIS — M54.12 CERVICAL RADICULOPATHY: Primary | ICD-10-CM

## 2018-09-10 DIAGNOSIS — M10.9 ACUTE GOUT OF LEFT FOOT, UNSPECIFIED CAUSE: ICD-10-CM

## 2018-09-10 PROCEDURE — 99243 OFF/OP CNSLTJ NEW/EST LOW 30: CPT | Performed by: PHYSICIAN ASSISTANT

## 2018-09-10 PROCEDURE — 99215 OFFICE O/P EST HI 40 MIN: CPT | Performed by: INTERNAL MEDICINE

## 2018-09-10 PROCEDURE — G0463 HOSPITAL OUTPT CLINIC VISIT: HCPCS

## 2018-09-10 RX ORDER — BUPROPION HYDROCHLORIDE 150 MG/1
150 TABLET ORAL EVERY MORNING
Qty: 30 TABLET | Refills: 2 | Status: SHIPPED | OUTPATIENT
Start: 2018-09-10 | End: 2018-10-25

## 2018-09-10 RX ORDER — INDOMETHACIN 50 MG/1
50 CAPSULE ORAL
Qty: 42 CAPSULE | Refills: 1 | Status: SHIPPED | OUTPATIENT
Start: 2018-09-10 | End: 2019-02-21

## 2018-09-10 ASSESSMENT — PAIN SCALES - GENERAL: PAINLEVEL: MILD PAIN (2)

## 2018-09-10 NOTE — LETTER
"    9/10/2018         RE: Denny Herrera  [address] [facility] [address]        Dear Colleague,    Thank you for referring your patient, Denny Herrera, to the [facility]UR[facility]. Please see a copy of my visit note below.    Dr. Johnny Azul  [facility]  Neurosurgery Clinic Visit      CC: Neck and arm pain    Primary care Provider: Gali Del Valle      Reason For Visit:   I was asked by Gali Del Valle MD to consult on the patient for cervical radiculopathy.      HPI: Denny Herrera is a 38 year old male who presents for evaluation of neck and right shoulder pain since last week. He has had similar sypmtoms in the past 10 years; usually 3-4 times per year. Pain is located in right neck and radiates into right shoulder. Describes the pain as a constant ache with intermittent sensation of \"pushing\" into his shoulder. Pain is worsened with movement of the neck. Pain is alleviated with lying flat. He has been doing physical therapy without lasting relief. He has also been taking flexeril and ibuprofen, which is only minimally helpful. No recent imaging or injections. Denies paresthesias into extremities at current (he has had one episode in the past into his right arm) or bladder/bowel incontinence.    Current pain: 2/10 At worst: 9/10    Past Medical History:   Diagnosis Date     Ankle joint pain 11/9/2012     Blood in semen      CARDIOVASCULAR SCREENING; LDL GOAL LESS THAN 160 6/20/2011     Elevated blood pressure reading without diagnosis of hypertension      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     Gout      Hepatic steatosis 3/14/2014     Hypertension      Hypertriglyceridaemia      Hypertriglyceridemia 2/8/2013     Obesity 2/8/2013     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     Raynaud disease      Raynaud's syndrome 3/14/2014       Past Medical History reviewed with patient during visit.    Past Surgical History:   Procedure Laterality Date     ARTHROSCOPY KNEE Right 6/15/2016 "    Procedure: ARTHROSCOPY KNEE;  Surgeon: Tim Valdez MD;  [facility]: [facility]     ARTHROTOMY WITH FRESH OSTEOCHONDRAL ALLOGRAFT KNEE Right 6/15/2016    Procedure: ARTHROTOMY WITH FRESH OSTEOCHONDRAL ALLOGRAFT KNEE;  Surgeon: Tim Valdez MD;  [facility]     C NONSPECIFIC PROCEDURE  1998    7 surgeries total on both knees. 2 for R knee, 3 L knee, ACL and meniscus arthroscopic procedures x 3, 2 open surgery     COLONOSCOPY N/A 8/19/2015    Procedure: COLONOSCOPY;  Surgeon: Timbo Greenberg MD;  Location: [facility]     ORTHOPEDIC SURGERY       OSTEOTOMY TIBIA Right 6/15/2016    Procedure: OSTEOTOMY TIBIA;  Surgeon: Tim Valdez MD;  [facility]     REMOVE HARDWARE KNEE Right 6/15/2016    Procedure: REMOVE HARDWARE KNEE;  Surgeon: Tim Valdez MD;  [facility]     Past Surgical History reviewed with patient during visit.    Current Outpatient Prescriptions   Medication     allopurinol (ZYLOPRIM) 300 MG tablet     buPROPion (WELLBUTRIN XL) 150 MG 24 hr tablet     cyclobenzaprine (FLEXERIL) 10 MG tablet     fenofibrate 160 MG tablet     indomethacin (INDOCIN) 50 MG capsule     lisinopril (PRINIVIL/ZESTRIL) 10 MG tablet     multivitamin, therapeutic with minerals (MULTI-VITAMIN) TABS     Omega-3 Fatty Acids (OMEGA-3 FISH OIL PO)     polyethylene glycol (MIRALAX) powder     VITAMIN D, CHOLECALCIFEROL, PO     No current facility-administered medications for this visit.        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[employer]      [employer]     Social History Main Topics     Smoking status: Never Smoker     Smokeless tobacco: Never Used     Alcohol use 0.0 - 1.0 oz/week     0 - 2 Standard drinks or equivalent per week      Comment:  rarely (up to 4 beers a month)     Drug use: No      Sexual activity: Yes     Partners: Female      Comment: vasectomy     Other Topics Concern     Not on file     Social History Narrative       Family History   Problem Relation Age of Onset     Family History Negative Other      neg for RA, IBD, psoriasis, does not know much about his father side of his family, neg for gout     Attention Deficit Disorder Other      Arthritis Mother      back DJD     Alcohol/Drug Mother      Depression Mother      Endocrine Disease Mother      Anxiety Disorder Mother      Back Pain Mother      Arthritis Brother      back DJD     HEART DISEASE Brother      MI at 40     Hypertension Brother      Bipolar Disorder Brother      Arthritis Maternal Grandmother      back DJD     Circulatory Maternal Grandmother      Eye Disorder Maternal Grandmother      Back Pain Maternal Grandmother      Cancer Maternal Aunt      ?type     Hypertension Brother      Circulatory Maternal Grandfather      Eye Disorder Maternal Grandfather      Hypertension Other      maternal grandparents     Depression Other      Alcohol/Drug Brother      Back Pain Brother      HEART DISEASE Father      HEART DISEASE Paternal Grandmother           ROS: 10 point ROS neg other than the symptoms noted above in the HPI.    Vital Signs: There were no vitals taken for this visit.    Examination:  Constitutional:  Alert, well nourished, NAD.  HEENT: Normocephalic, atraumatic.   Pulmonary:  Without shortness of breath, normal effort.   Lymph: no lymphadenopathy to low back or LE.   Integumentary: Skin is free of rashes or lesions.   Cardiovascular:  No pitting edema of BLE.      Neurological:  Awake  Alert  Oriented x 3  Speech clear  Cranial nerves II - XII grossly intact  PERRL  EOMI  Face symmetric  Tongue midline  Motor exam   Shoulder Abduction:  Right:  5/5   Left:  5/5  Biceps:                      Right:  5/5   Left:  5/5  Triceps:                     Right:  5/5   Left:  5/5  Wrist Extensors:       Right:  5/5   Left:   "5/5  Wrist Flexors:           Right:  5/5   Left:  5/5  Intrinsics:                   Right:  5/5   Left:  5/5   Sensation normal to bilateral upper extremities.    Reflexes are 2+ in the brachial radialis and triceps. Negative Shady sign bilaterally.  Musculoskeletal:  Gait: Able to stand from a seated position. Normal non-antalgic, non-myelopathic gait.  Able to heel/toe walk without loss of balance  Cervical examination reveals good range of motion with increase in pain with rotation.  No tenderness to palpation of the cervical spine or paraspinous muscles bilaterally.     +Hawkin's on the right  No pain with raising arms above head  - cross body adduction of R shoulder    Imaging:   None    Assessment/Plan:   Denny Herrera is a 38 year old male ho presents for evaluation of neck and right shoulder pain since last week. He has had similar sypmtoms in the past 10 years; usually 3-4 times per year. Pain is located in right neck and radiates into right shoulder. Describes the pain as a constant ache with intermittent sensation of \"pushing\" into his shoulder. He has been doing physical therapy and taking medications without lasting relief. Given his recurrent ongoing radicular symptoms, I have ordered him a cervical MRI for further evaluation and will call him with the results. Will have him continue PT in the interim as well. Patient voiced understanding and agreement.          Lavern Velasquez PA-C  [facility]  [address] [address]    Tel [phone]  Pager [phone]      Again, thank you for allowing me to participate in the care of your patient.        Sincerely,        Lavern Velasquez PA-C    "

## 2018-09-10 NOTE — PROGRESS NOTES
SUBJECTIVE:   Denny Herrera is a 38 year old male who presents to clinic today for the following health issues:    Follow up ADHD testing per Psychology  - pt had testing done with Psychology. Results in chart. Diagnosed with mild depression and borderline ADD. Recommended trial of antidepressant medication that might also help with ADD. Patient reports a lot of stress at work. Is on 2 large projects and then also has several other small projects. Is the only person who knows how to do some things, so everyone comes to him. Constantly interrupted when at work and doesn't get to the main projects and then has to finish work at home and ends up staying up late. Has talked with work and plans to off-load some of his projects and train others. Feels this will help.    Gout - arch of left foot. Started a few days ago. Was supposed to have stress test today, but didn't think he could do the stress test with gout. Has been taking ibuprofen. Has done well with indomethacin in the past.    Stress test - family history of heart disease in brother and father. Brother with MI at 40. Patient with elevated cholesterol and obesity. Plan stress test for further evaluation. Has not yet started aspirin.     Weight - continues to struggle with weight. It is up a little from before. Having a hard time getting exercise in with work and kids at home. Plans to alternate with wife to get time for exercise. Also has started taking older son and going on hikes/walks on the weekend. Exercise somewhat limited by knee pain. Also has difficulty with diet. Does not like any vegetables except for spinach unless cooked in tomato sauce or chili or soup. Also does not like many fruits.     Reviewed and updated as needed this visit by clinical staff  Tobacco  Allergies  Meds  Problems  Med Hx  Surg Hx  Fam Hx  Soc Hx        Reviewed and updated as needed this visit by Provider  Allergies  Meds  Problems      "  -------------------------------------    ROS:  Constitutional, HEENT, cardiovascular, pulmonary, gi and gu systems are negative, except as otherwise noted.    OBJECTIVE:                                                    /90 (BP Location: Right arm, Patient Position: Sitting, Cuff Size: Adult Large)  Pulse 81  Temp 98  F (36.7  C) (Tympanic)  Ht 5' 10.5\" (1.791 m)  Wt 270 lb 14.4 oz (122.9 kg)  SpO2 97%  BMI 38.32 kg/m2   Body mass index is 38.32 kg/(m^2).  General Appearance: obese, alert and no distress  Eyes:   no discharge, erythema.  Normal pupils.  Respiratory: lungs clear to auscultation - no rales, rhonchi or wheezes.  Cardiovascular: regular rate and rhythm, normal S1 S2, no S3 or S4 and no murmur, click or rub.  No peripheral edema.  Skin: no rashes or lesions.  Well perfused and normal turgor.    Diagnostic Test Results:  none      ASSESSMENT/PLAN:                                                      (F90.0) Attention deficit hyperactivity disorder (ADHD), predominantly inattentive type  (primary encounter diagnosis)  Comment: borderline for ADD. Discussed difficult sometimes to tell if difficulty with attention causing depression symptoms or vice versa  Plan: buPROPion (WELLBUTRIN XL) 150 MG 24 hr tablet  - will start bupropion 150 mg daily to see if helps with both symptoms - discussed possible side effects.  - f/u in 2 months - will come fasting    (F32.0) Mild major depression (H)  Comment: stressors at work. No significant anxiety.  Plan: buPROPion (WELLBUTRIN XL) 150 MG 24 hr tablet  - start bupropion 150 mg daily - discussed possible side effects  - continue with therapist  - f/u in 2 months    (M10.9) Acute gout of left foot, unspecified cause  Comment: flare in left foot  Plan: indomethacin (INDOCIN) 50 MG capsule  - stop ibuprofen, start indomethacin    (Z82.49) Family history of premature coronary heart disease  Comment: brother with MI at 40. Also father, grandparents.   Plan: " "  Plans stress test  - start aspirin  - had discussed possible BB     (E66.01) Obesity (H)  Comment: difficulty with weight loss  Plan:   - lost of dietary restrictions  - also drinking mountain dew - discussed limiting to one and possibly switching to small cans  - discussed reduced portions  - discussed nutritionist - will let me know if wants a referral for this  - discussed exercise  - discussed weight loss programs in area with health coaching  - consider referral to medical weight loss clinic    BMI:   Estimated body mass index is 38.32 kg/(m^2) as calculated from the following:    Height as of this encounter: 5' 10.5\" (1.791 m).    Weight as of this encounter: 270 lb 14.4 oz (122.9 kg).   Weight management plan: Discussed healthy diet and exercise guidelines and patient will follow up in 3 months in clinic to re-evaluate.      FUTURE APPOINTMENTS:       - Follow-up visit in 2 months    A total of 40 minutes was spent in face-to-face contact with Denny in clinic today, of which >50% was spent counseling or in coordination of care regarding ADD, depression, gout, family history of heart disease and obesity.    [facility]          "

## 2018-09-10 NOTE — MR AVS SNAPSHOT
After Visit Summary   9/10/2018    Denny Herrera    MRN: [id number]           Patient Information     Date Of Birth          1980        Visit Information        Provider Department      9/10/2018 9:40 AM Eligio, Gali Rosas MD [facility]        Today's Diagnoses     Attention deficit hyperactivity disorder (ADHD), predominantly inattentive type    -  1    Mild major depression (H)        Acute gout of left foot, unspecified cause          Care Instructions    1. Start aspirin 81 mg once a day  2. Sent indomethacin for gout - do not take ibuprofen at the same time  3. Start wellbutrin (bupropion) 150 mg once a day for depression and ADHD  4. Follow-up in 2 months  - come fasting          Follow-ups after your visit        Follow-up notes from your care team     Return in about 2 months (around 11/10/2018).      Your next 10 appointments already scheduled     Sep 10, 2018 11:40 AM CDT   New Visit with Johnny Azul MD   [facility] ([facility])    [address] [place] [address]   [phone]            Sep 17, 2018  2:00 PM CDT   Ech Stress Test with RHSTRESS   [facility] ([facility])    [address]  [facility] [id number] [phone]           1. Please bring or wear a comfortable two-piece outfit and walking shoes. 2. Stop eating 3 hours before the test. You may drink water or juice. 3. Stop all caffeine 12 hours before the test. This includes coffee, tea, soda pop, chocolate and certain medicines (such as Anacin and Excederin). Also avoid decaf coffee and tea, as these contain small amounts of caffeine. 4. No alcohol, smoking or use of other tobacco products for 12 hours before the test. 5. Refer to your provider instructions to see if you need to stop any medications (such as beta-blockers or nitrates) for this test. 6. For patients with diabetes: - If you  take insulin, call your diabetes care team. Ask if you should take a   dose the morning of your test. - If you take diabetes medicine by mouth, dont take it on the morning of your test. Bring it with you to take after the test. (If you have questions, call your diabetes care team) 7. When you arrive, please tell us if: - You have diabetes. - You have taken Viagra, Cialis or Levitra in the past 48 hours. 8. For any questions that cannot be answered, please contact the ordering physician 9. Please do not wear perfumes or scented lotions on the day of your exam.            Oct 03, 2018  2:00 PM CDT   Return Visit with Cindy Castañeda PsyD   [facility] ([facility])    [address]  [place] [address] [phone]              Who to contact     If you have questions or need follow up information about today's clinic visit or your schedule please contact [facility] directly at [phone].  Normal or non-critical lab and imaging results will be communicated to you by MyChart, letter or phone within 4 business days after the clinic has received the results. If you do not hear from us within 7 days, please contact the clinic through [employer] or phone. If you have a critical or abnormal lab result, we will notify you by phone as soon as possible.  Submit refill requests through dVisit or call your pharmacy and they will forward the refill request to us. Please allow 3 business days for your refill to be completed.          Additional Information About Your Visit        dVisit Information     dVisit gives you secure access to your electronic health record. If you see a primary care provider, you can also send messages to your care team and make appointments. If you have questions, please call your primary care clinic.  If you do not have a primary care provider, please call [phone] and they will assist you.        Care EveryWhere ID     This is your Care EveryWhere ID.  "This could be used by other organizations to access your [facility] medical records  [id number]        Your Vitals Were     Pulse Temperature Height Pulse Oximetry BMI (Body Mass Index)       81 98  F (36.7  C) (Tympanic) 5' 10.5\" (1.791 m) 97% 38.32 kg/m2        Blood Pressure from Last 3 Encounters:   09/10/18 134/90   07/23/18 136/84   04/09/18 134/76    Weight from Last 3 Encounters:   09/10/18 270 lb 14.4 oz (122.9 kg)   07/23/18 266 lb (120.7 kg)   04/09/18 266 lb 11.2 oz (121 kg)              Today, you had the following     No orders found for display         Today's Medication Changes          These changes are accurate as of 9/10/18 10:13 AM.  If you have any questions, ask your nurse or doctor.               Start taking these medicines.        Dose/Directions    buPROPion 150 MG 24 hr tablet   Commonly known as:  WELLBUTRIN XL   Used for:  Attention deficit hyperactivity disorder (ADHD), predominantly inattentive type, Mild major depression (H)   Started by:  Gali Del Valle MD        Dose:  150 mg   Take 1 tablet (150 mg) by mouth every morning   Quantity:  30 tablet   Refills:  2       indomethacin 50 MG capsule   Commonly known as:  INDOCIN   Used for:  Acute gout of left foot, unspecified cause   Started by:  Gali Del Valle MD        Dose:  50 mg   Take 1 capsule (50 mg) by mouth 3 times daily (with meals)   Quantity:  42 capsule   Refills:  1            Where to get your medicines      These medications were sent to [facility] [address] [place] [address] AT [place] [address]  [address] [facility] [address]     Phone:  [phone]     buPROPion 150 MG 24 hr tablet    indomethacin 50 MG capsule                Primary Care Provider Office Phone # Fax #    Gali Del Valle [phone]       [facility] [place] [address]        Equal Access to Services     VENKAT HARTMAN AH: Antwan mijares " Sokerry, wabijalda luqadaha, qaybta kaalmada maite, hardeep townsend leslielucille mondragonnikolai tushar. So [facility] [phone].    ATENCIÓN: Si j carlos franco, tiene a tapia disposición servicios gratuitos de asistencia lingüística. Wood al [phone].    We comply with applicable federal civil rights laws and [place] laws. We do not discriminate on the basis of race, color, national origin, age, disability, sex, sexual orientation, or gender identity.            Thank you!     Thank you for choosing [facility]  for your care. Our goal is always to provide you with excellent care. Hearing back from our patients is one way we can continue to improve our services. Please take a few minutes to complete the written survey that you may receive in the mail after your visit with us. Thank you!             Your Updated Medication List - Protect others around you: Learn how to safely use, store and throw away your medicines at www.disposemymeds.org.          This list is accurate as of 9/10/18 10:13 AM.  Always use your most recent med list.                   Brand Name Dispense Instructions for use Diagnosis    allopurinol 300 MG tablet    ZYLOPRIM    135 tablet    TAKE 1 TABLET BY MOUTH EVERY DAY AND 1/2 TABLET BY MOUTH EVERY EVENING    Podagra       buPROPion 150 MG 24 hr tablet    WELLBUTRIN XL    30 tablet    Take 1 tablet (150 mg) by mouth every morning    Attention deficit hyperactivity disorder (ADHD), predominantly inattentive type, Mild major depression (H)       cyclobenzaprine 10 MG tablet    FLEXERIL    90 tablet    Take 0.5-1 tablets (5-10 mg) by mouth 3 times daily as needed for muscle spasms    Neck muscle spasm       fenofibrate 160 MG tablet     90 tablet    TAKE 1 TABLET(160 MG) BY MOUTH DAILY    Hypertriglyceridemia       indomethacin 50 MG capsule    INDOCIN    42 capsule    Take 1 capsule (50 mg) by mouth 3 times daily (with meals)    Acute gout of left foot, unspecified cause       lisinopril 10 MG  tablet    PRINIVIL/ZESTRIL    30 tablet    Take 1 tablet (10 mg) by mouth daily    Benign essential hypertension       MIRALAX powder   Generic drug:  polyethylene glycol     510 g    Take 17 g (1 capful) by mouth daily    Status post osteotomy       Multi-vitamin Tabs tablet      Take 1 tablet by mouth daily        OMEGA-3 FISH OIL PO      Take 1 capsule by mouth daily        VITAMIN D (CHOLECALCIFEROL) PO      Take by mouth daily

## 2018-09-10 NOTE — MR AVS SNAPSHOT
After Visit Summary   9/10/2018    Denny Herrera    MRN: [id number]           Patient Information     Date Of Birth          1980        Visit Information        Provider Department      9/10/2018 11:40 AM Lavern Velasquez PA-C [facility]        Today's Diagnoses     Cervical radiculopathy    -  1      Care Instructions    1. Cervical MRI ordered - I will call you with results    2. Continue physical therapy          Follow-ups after your visit        Your next 10 appointments already scheduled     Sep 17, 2018  2:00 PM CDT   Ech Stress Test with RHSTRESS   [facility] ([facility])    [address]  [facility] [id number] [phone]           1. Please bring or wear a comfortable two-piece outfit and walking shoes. 2. Stop eating 3 hours before the test. You may drink water or juice. 3. Stop all caffeine 12 hours before the test. This includes coffee, tea, soda pop, chocolate and certain medicines (such as Anacin and Excederin). Also avoid decaf coffee and tea, as these contain small amounts of caffeine. 4. No alcohol, smoking or use of other tobacco products for 12 hours before the test. 5. Refer to your provider instructions to see if you need to stop any medications (such as beta-blockers or nitrates) for this test. 6. For patients with diabetes: - If you take insulin, call your diabetes care team. Ask if you should take a   dose the morning of your test. - If you take diabetes medicine by mouth, dont take it on the morning of your test. Bring it with you to take after the test. (If you have questions, call your diabetes care team) 7. When you arrive, please tell us if: - You have diabetes. - You have taken Viagra, Cialis or Levitra in the past 48 hours. 8. For any questions that cannot be answered, please contact the ordering physician 9. Please do not wear perfumes or scented lotions on the day of your exam.             Oct 03, 2018  2:00 PM CDT   Return Visit with Cindy Ms Maddy PsyD   [facility] ([facility] [place]) [address] [place] [address] [phone]              Future tests that were ordered for you today     Open Future Orders        Priority Expected Expires Ordered    MR Cervical Spine w/o Contrast Routine  9/10/2019 9/10/2018            Who to contact     If you have questions or need follow up information about today's clinic visit or your schedule please contact [facility] directly at [phone].  Normal or non-critical lab and imaging results will be communicated to you by [employer]Share[employer], letter or phone within 4 business days after the clinic has received the results. If you do not hear from us within 7 days, please contact the clinic through [employer] or phone. If you have a critical or abnormal lab result, we will notify you by phone as soon as possible.  Submit refill requests through QRGL or call your pharmacy and they will forward the refill request to us. Please allow 3 business days for your refill to be completed.          Additional Information About Your Visit        MyChart Information     QRGL gives you secure access to your electronic health record. If you see a primary care provider, you can also send messages to your care team and make appointments. If you have questions, please call your primary care clinic.  If you do not have a primary care provider, please call [phone] and they will assist you.        Care EveryWhere ID     This is your Care EveryWhere ID. This could be used by other organizations to access your [place] medical records  [id number]        Your Vitals Were     Pulse Temperature Pulse Oximetry             82 97.6  F (36.4  C) (Oral) 98%          Blood Pressure from Last 3 Encounters:   09/10/18 (!) 144/93   09/10/18 134/90   07/23/18 136/84    Weight from Last 3 Encounters:   09/10/18 270 lb 14.4 oz (122.9  kg)   07/23/18 266 lb (120.7 kg)   04/09/18 266 lb 11.2 oz (121 kg)                 Today's Medication Changes          These changes are accurate as of 9/10/18 11:58 AM.  If you have any questions, ask your nurse or doctor.               Start taking these medicines.        Dose/Directions    buPROPion 150 MG 24 hr tablet   Commonly known as:  WELLBUTRIN XL   Used for:  Attention deficit hyperactivity disorder (ADHD), predominantly inattentive type, Mild major depression (H)   Started by:  Gali Del Valle MD        Dose:  150 mg   Take 1 tablet (150 mg) by mouth every morning   Quantity:  30 tablet   Refills:  2       indomethacin 50 MG capsule   Commonly known as:  INDOCIN   Used for:  Acute gout of left foot, unspecified cause   Started by:  Gali Del Valle MD        Dose:  50 mg   Take 1 capsule (50 mg) by mouth 3 times daily (with meals)   Quantity:  42 capsule   Refills:  1            Where to get your medicines      These medications were sent to [facility] [address] [place] [address]     Phone:  [phone]     buPROPion 150 MG 24 hr tablet    indomethacin 50 MG capsule                Primary Care Provider Office Phone # Fax #    Gali Del Valle [phone]       [facility] [place] [address]        Equal Access to Services     [facility]: Hadii andrea hookero Sokerry, waaxda luqadaha, qaybta kaalmada maite, hardeep finley. So [facility] [phone].    ATENCIÓN: Si habla español, tiene a tapia disposición servicios gratuitos de asistencia lingüística. Wood al [phone].    We comply with applicable federal civil rights laws and [place] laws. We do not discriminate on the basis of race, color, national origin, age, disability, sex, sexual orientation, or gender identity.            Thank you!     Thank you for choosing  [facility]  for your care. Our goal is always to provide you with excellent care. Hearing back from our patients is one way we can continue to improve our services. Please take a few minutes to complete the written survey that you may receive in the mail after your visit with us. Thank you!             Your Updated Medication List - Protect others around you: Learn how to safely use, store and throw away your medicines at www.disposemymeds.org.          This list is accurate as of 9/10/18 11:58 AM.  Always use your most recent med list.                   Brand Name Dispense Instructions for use Diagnosis    allopurinol 300 MG tablet    ZYLOPRIM    135 tablet    TAKE 1 TABLET BY MOUTH EVERY DAY AND 1/2 TABLET BY MOUTH EVERY EVENING    Podagra       aspirin 81 MG tablet      Take 81 mg by mouth daily        buPROPion 150 MG 24 hr tablet    WELLBUTRIN XL    30 tablet    Take 1 tablet (150 mg) by mouth every morning    Attention deficit hyperactivity disorder (ADHD), predominantly inattentive type, Mild major depression (H)       cyclobenzaprine 10 MG tablet    FLEXERIL    90 tablet    Take 0.5-1 tablets (5-10 mg) by mouth 3 times daily as needed for muscle spasms    Neck muscle spasm       fenofibrate 160 MG tablet     90 tablet    TAKE 1 TABLET(160 MG) BY MOUTH DAILY    Hypertriglyceridemia       indomethacin 50 MG capsule    INDOCIN    42 capsule    Take 1 capsule (50 mg) by mouth 3 times daily (with meals)    Acute gout of left foot, unspecified cause       lisinopril 10 MG tablet    PRINIVIL/ZESTRIL    30 tablet    Take 1 tablet (10 mg) by mouth daily    Benign essential hypertension       MIRALAX powder   Generic drug:  polyethylene glycol     510 g    Take 17 g (1 capful) by mouth daily    Status post osteotomy       Multi-vitamin Tabs tablet      Take 1 tablet by mouth daily        OMEGA-3 FISH OIL PO      Take 1 capsule by mouth daily        VITAMIN D (CHOLECALCIFEROL) PO      Take  by mouth daily

## 2018-09-10 NOTE — NURSING NOTE
"Denny Herrera is a 38 year old male who presents for:  Chief Complaint   Patient presents with     Neurologic Problem     referral from Dr. Del Valle for cervical radiculopathy        Vitals:    There were no vitals filed for this visit.    BMI:  Estimated body mass index is 38.32 kg/(m^2) as calculated from the following:    Height as of an earlier encounter on 9/10/18: 5' 10.5\" (1.791 m).    Weight as of an earlier encounter on 9/10/18: 270 lb 14.4 oz (122.9 kg).    Pain Score:  Data Unavailable        Funmi Ward, DENISSE, AAS      "

## 2018-09-10 NOTE — PROGRESS NOTES
"Dr. Johnny Azul  [facility]  Neurosurgery Clinic Visit      CC: Neck and arm pain    Primary care Provider: Gali Del Valle      Reason For Visit:   I was asked by Gali Del Valle MD to consult on the patient for cervical radiculopathy.      HPI: Denny Herrera is a 38 year old male who presents for evaluation of neck and right shoulder pain since last week. He has had similar sypmtoms in the past 10 years; usually 3-4 times per year. Pain is located in right neck and radiates into right shoulder. Describes the pain as a constant ache with intermittent sensation of \"pushing\" into his shoulder. Pain is worsened with movement of the neck. Pain is alleviated with lying flat. He has been doing physical therapy without lasting relief. He has also been taking flexeril and ibuprofen, which is only minimally helpful. No recent imaging or injections. Denies paresthesias into extremities at current (he has had one episode in the past into his right arm) or bladder/bowel incontinence.    Current pain: 2/10 At worst: 9/10    Past Medical History:   Diagnosis Date     Ankle joint pain 11/9/2012     Blood in semen      CARDIOVASCULAR SCREENING; LDL GOAL LESS THAN 160 6/20/2011     Elevated blood pressure reading without diagnosis of hypertension      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     Gout      Hepatic steatosis 3/14/2014     Hypertension      Hypertriglyceridaemia      Hypertriglyceridemia 2/8/2013     Obesity 2/8/2013     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     Raynaud disease      Raynaud's syndrome 3/14/2014       Past Medical History reviewed with patient during visit.    Past Surgical History:   Procedure Laterality Date     ARTHROSCOPY KNEE Right 6/15/2016    Procedure: ARTHROSCOPY KNEE;  Surgeon: Tim Valdez MD;  Location: [facility]     ARTHROTOMY WITH FRESH OSTEOCHONDRAL ALLOGRAFT KNEE Right 6/15/2016    Procedure: ARTHROTOMY WITH FRESH OSTEOCHONDRAL ALLOGRAFT KNEE;  Surgeon: Tim Valdez, " MD;  Location: [facility] OR     C NONSPECIFIC PROCEDURE  1998    7 surgeries total on both knees. 2 for R knee, 3 L knee, ACL and meniscus arthroscopic procedures x 3, 2 open surgery     COLONOSCOPY N/A 8/19/2015    Procedure: COLONOSCOPY;  Surgeon: Timbo Greenberg MD;  Location:  GI     ORTHOPEDIC SURGERY       OSTEOTOMY TIBIA Right 6/15/2016    Procedure: OSTEOTOMY TIBIA;  Surgeon: Tim Valdez MD;  [facility]: [facility] OR     REMOVE HARDWARE KNEE Right 6/15/2016    Procedure: REMOVE HARDWARE KNEE;  Surgeon: Tim Valdez MD;  Location: [facility] OR     Past Surgical History reviewed with patient during visit.    Current Outpatient Prescriptions   Medication     allopurinol (ZYLOPRIM) 300 MG tablet     buPROPion (WELLBUTRIN XL) 150 MG 24 hr tablet     cyclobenzaprine (FLEXERIL) 10 MG tablet     fenofibrate 160 MG tablet     indomethacin (INDOCIN) 50 MG capsule     lisinopril (PRINIVIL/ZESTRIL) 10 MG tablet     multivitamin, therapeutic with minerals (MULTI-VITAMIN) TABS     Omega-3 Fatty Acids (OMEGA-3 FISH OIL PO)     polyethylene glycol (MIRALAX) powder     VITAMIN D, CHOLECALCIFEROL, PO     No current facility-administered medications for this visit.        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[employer]     Social History Main Topics     Smoking status: Never Smoker     Smokeless tobacco: Never Used     Alcohol use 0.0 - 1.0 oz/week     0 - 2 Standard drinks or equivalent per week      Comment:  rarely (up to 4 beers a month)     Drug use: No     Sexual activity: Yes     Partners: Female      Comment: vasectomy     Other Topics Concern     Not on file     Social History Narrative       Family History   Problem Relation Age of Onset     Family History Negative Other      neg for RA, IBD, psoriasis, does  not know much about his father side of his family, neg for gout     Attention Deficit Disorder Other      Arthritis Mother      back DJD     Alcohol/Drug Mother      Depression Mother      Endocrine Disease Mother      Anxiety Disorder Mother      Back Pain Mother      Arthritis Brother      back DJD     HEART DISEASE Brother      MI at 40     Hypertension Brother      Bipolar Disorder Brother      Arthritis Maternal Grandmother      back DJD     Circulatory Maternal Grandmother      Eye Disorder Maternal Grandmother      Back Pain Maternal Grandmother      Cancer Maternal Aunt      ?type     Hypertension Brother      Circulatory Maternal Grandfather      Eye Disorder Maternal Grandfather      Hypertension Other      maternal grandparents     Depression Other      Alcohol/Drug Brother      Back Pain Brother      HEART DISEASE Father      HEART DISEASE Paternal Grandmother           ROS: 10 point ROS neg other than the symptoms noted above in the HPI.    Vital Signs: There were no vitals taken for this visit.    Examination:  Constitutional:  Alert, well nourished, NAD.  HEENT: Normocephalic, atraumatic.   Pulmonary:  Without shortness of breath, normal effort.   Lymph: no lymphadenopathy to low back or LE.   Integumentary: Skin is free of rashes or lesions.   Cardiovascular:  No pitting edema of BLE.      Neurological:  Awake  Alert  Oriented x 3  Speech clear  Cranial nerves II - XII grossly intact  PERRL  EOMI  Face symmetric  Tongue midline  Motor exam   Shoulder Abduction:  Right:  5/5   Left:  5/5  Biceps:                      Right:  5/5   Left:  5/5  Triceps:                     Right:  5/5   Left:  5/5  Wrist Extensors:       Right:  5/5   Left:  5/5  Wrist Flexors:           Right:  5/5   Left:  5/5  Intrinsics:                   Right:  5/5   Left:  5/5   Sensation normal to bilateral upper extremities.    Reflexes are 2+ in the brachial radialis and triceps. Negative Shady sign  "bilaterally.  Musculoskeletal:  Gait: Able to stand from a seated position. Normal non-antalgic, non-myelopathic gait.  Able to heel/toe walk without loss of balance  Cervical examination reveals good range of motion with increase in pain with rotation.  No tenderness to palpation of the cervical spine or paraspinous muscles bilaterally.     +Hawkin's on the right  No pain with raising arms above head  - cross body adduction of R shoulder    Imaging:   None    Assessment/Plan:   Denny Herrera is a 38 year old male ho presents for evaluation of neck and right shoulder pain since last week. He has had similar sypmtoms in the past 10 years; usually 3-4 times per year. Pain is located in right neck and radiates into right shoulder. Describes the pain as a constant ache with intermittent sensation of \"pushing\" into his shoulder. He has been doing physical therapy and taking medications without lasting relief. Given his recurrent ongoing radicular symptoms, I have ordered him a cervical MRI for further evaluation and will call him with the results. Will have him continue PT in the interim as well. Patient voiced understanding and agreement.          Lavern Velasquez PA-C  [facility]  [address] [address]    Tel [phone]  Pager [phone]    "

## 2018-09-10 NOTE — PATIENT INSTRUCTIONS
1. Start aspirin 81 mg once a day  2. Sent indomethacin for gout - do not take ibuprofen at the same time  3. Start wellbutrin (bupropion) 150 mg once a day for depression and ADHD  4. Follow-up in 2 months  - come fasting

## 2018-09-11 ENCOUNTER — HOSPITAL ENCOUNTER (OUTPATIENT)
Dept: MRI IMAGING | Facility: CLINIC | Age: 38
Discharge: HOME | End: 2018-09-11
Attending: PHYSICIAN ASSISTANT | Admitting: PHYSICIAN ASSISTANT
Payer: COMMERCIAL

## 2018-09-11 DIAGNOSIS — M54.12 CERVICAL RADICULOPATHY: ICD-10-CM

## 2018-09-11 PROCEDURE — 72141 MRI NECK SPINE W/O DYE: CPT

## 2018-09-11 ASSESSMENT — PATIENT HEALTH QUESTIONNAIRE - PHQ9: SUM OF ALL RESPONSES TO PHQ QUESTIONS 1-9: 15

## 2018-09-12 ENCOUNTER — TELEPHONE (OUTPATIENT)
Dept: NEUROSURGERY | Facility: CLINIC | Age: 38
End: 2018-09-12

## 2018-09-12 NOTE — TELEPHONE ENCOUNTER
[facility] for patient to review MRI results. Would recommend right C5-6 ECHO and f/u with Dr. Azul if pain not alleviated.

## 2018-09-17 ENCOUNTER — HOSPITAL ENCOUNTER (OUTPATIENT)
Dept: CARDIOLOGY | Facility: CLINIC | Age: 38
Discharge: HOME | End: 2018-09-17
Attending: INTERNAL MEDICINE | Admitting: INTERNAL MEDICINE
Payer: COMMERCIAL

## 2018-09-17 DIAGNOSIS — I10 BENIGN ESSENTIAL HYPERTENSION: ICD-10-CM

## 2018-09-17 DIAGNOSIS — Z82.49 FAMILY HISTORY OF PREMATURE CORONARY HEART DISEASE: ICD-10-CM

## 2018-09-17 DIAGNOSIS — R53.83 FATIGUE, UNSPECIFIED TYPE: ICD-10-CM

## 2018-09-17 PROCEDURE — 93016 CV STRESS TEST SUPVJ ONLY: CPT | Performed by: INTERNAL MEDICINE

## 2018-09-17 PROCEDURE — 93321 DOPPLER ECHO F-UP/LMTD STD: CPT | Mod: TC

## 2018-09-17 PROCEDURE — 93325 DOPPLER ECHO COLOR FLOW MAPG: CPT | Mod: 26 | Performed by: INTERNAL MEDICINE

## 2018-09-17 PROCEDURE — 25500064 ZZH RX 255 OP 636: Performed by: INTERNAL MEDICINE

## 2018-09-17 PROCEDURE — 93321 DOPPLER ECHO F-UP/LMTD STD: CPT | Mod: 26 | Performed by: INTERNAL MEDICINE

## 2018-09-17 PROCEDURE — 93018 CV STRESS TEST I&R ONLY: CPT | Performed by: INTERNAL MEDICINE

## 2018-09-17 PROCEDURE — 93350 STRESS TTE ONLY: CPT | Mod: 26 | Performed by: INTERNAL MEDICINE

## 2018-09-17 RX ADMIN — HUMAN ALBUMIN MICROSPHERES AND PERFLUTREN 3 ML: 10; .22 INJECTION, SOLUTION INTRAVENOUS at 14:45

## 2018-09-27 ENCOUNTER — TELEPHONE (OUTPATIENT)
Dept: NEUROSURGERY | Facility: CLINIC | Age: 38
End: 2018-09-27

## 2018-09-27 ENCOUNTER — TELEPHONE (OUTPATIENT)
Dept: PALLIATIVE MEDICINE | Facility: CLINIC | Age: 38
End: 2018-09-27

## 2018-09-27 DIAGNOSIS — M54.12 CERVICAL RADICULOPATHY: Primary | ICD-10-CM

## 2018-09-27 NOTE — TELEPHONE ENCOUNTER
Patient scheduled for right ZOHREH. Did you want a transforminal injection done for him or would interlaminar be ok. Please advise.     Louise DELGADON-RN Care Coordinator  [facility]

## 2018-09-27 NOTE — TELEPHONE ENCOUNTER
Pre-screening Questions for Radiology Injections:    Injection to be done at which interventional clinic site? [facility]    Instruct patient to arrive as directed prior to the scheduled appointment time:    [place] AND [place]: 30 minutes before      Procedure ordered by Lavern Velasquez PA-C    Procedure ordered? Cervical Epidural Steroid Injection    What insurance would patient like us to bill for this procedure? BCBS      Worker's comp or MVA (motor vehicle accident) -Any injection DO NOT SCHEDULE and route to Lulu Frye.      [employer] - For SI joint injections, DO NOT SCHEDULE and route Anitha Jackson. [employer] NO PA REQUIRED EFFECTIVE 11/1/2017      [employer]- MBB's must be scheduled at LEAST two weeks apart      [employer] - Any injection besides hip/shoulder/knee joint DO NOT SCHEDULE and route to Anitha Jackson. She will obtain PA and call pt back to schedule procedure or notify pt of denial.       [employer]-Route to Anitha for review    Any chance of pregnancy? NO   If YES, do NOT schedule and route to RN pool    Is an  needed? No     Patient has a drive home? (mandatory) YES: INFORMED    Is patient taking any blood thinners (plavix, coumadin, jantoven, warfarin, heparin, pradaxa or dabigatran )? No   If hold needed, do NOT schedule, route to RN pool     Is patient taking any aspirin products (includes Excedrin and Fiorinal)? No     If more than 325mg/day do NOT schedule; route to RN pool     For CERVICAL procedures, hold all aspirin products for 6 days.     Tell pt that if aspirin product is not held for 6 days, the procedure WILL BE cancelled.      Does the patient have a bleeding or clotting disorder? No     If YES, okay to schedule AND route to RN nurse pool    For any patients with platelet count <100, must be forwarded to provider    Is patient diabetic?  No  If YES, have them bring their glucometer.    Does patient have an active infection or treated  for one within the past week? No     Is patient currently taking any antibiotics?  No     For patients on chronic, preventative, or prophylactic antibiotics, procedures may be scheduled.     For patients on antibiotics for active or recent infection:    Tyler Lomeli Burton, Snitzer-antibiotic course must have been completed for 4 days    Is patient currently taking any steroid medications? (i.e. Prednisone, Medrol)  No     For patients on steroid medications:    Tyler Lomeli Burton, Snitzer-steroid course must have been completed for 4 days    Reviewed with patient:  If you are started on any steroids or antibiotics between now and your appointment, you must contact us because the procedure may need to be cancelled.  Yes    Is patient actively being treated for cancer or immunocompromised? No  If YES, do NOT schedule and route to RN pool     Are you able to get on and off an exam table with minimal or no assistance? Yes  If NO, do NOT schedule and route to RN pool    Are you able to roll over and lay on your stomach with minimal or no assistance? Yes  If NO, do NOT schedule and route to RN pool     Any allergies to contrast dye, iodine, shellfish, or numbing and steroid medications? No  If YES, route to RN pool AND add allergy information to appointment notes    Allergies: No known drug allergies      Has the patient had a flu shot or any other vaccinations within 7 days before or after the procedure.  No     Does patient have an MRI/CT?  YES: MRI  (SI joint, hip injections, lumbar sympathetic blocks, and stellate ganglion blocks do not require an MRI)    Was the MRI done w/in the last 3 years?  Yes    Was MRI done at [place]? Yes      If not, where was it done? N/A       If MRI was not done at [place], [facility] or [facility] do NOT schedule and route to nursing.  If pt has an imaging disc, the injection may be scheduled but pt has to bring disc to appt. If they show up w/out disc the  injection cannot be done    Reminders (please tell patient if applicable):       Instructed pt to arrive 30 minutes early for IV start if this is for a cervical procedure, ALL sympathetic (stellate ganglion, hypogastric, or lumbar sympathetic block) and all sedation procedures (RFA, spinal cord stimulation trials).  YES: INFORMED   -IVs are not routinely placed for Dr. Cunha cervical cases   -Dr. Garcia: IVs for cervical ESIs and cervical TBDs (not CMBBs/facet inj)      If NPO for sedation, informed patient that it is okay to take medications with sips of water (except if they are to hold blood thinners).  Not Applicable   *DO take blood pressure medication if it is prescribed*      If this is for a cervical ECHO, informed patient that aspirin needs to be held for 6 days.   YES: INFORMED      For all patients not having spinal cord stimulator (SCS) trials or radiofrequency ablations (RFAs), informed patient:    IV sedation is not provided for this procedure.  If you feel that an oral anti-anxiety medication is needed, you can discuss this further with your referring provider or primary care provider.  The Pain Clinic provider will discuss specifics of what the procedure includes at your appointment.  Most procedures last 10-20 minutes.  We use numbing medications to help with any discomfort during the procedure.  Not Applicable      Do not schedule procedures requiring IV placement in the first appointment of the day or first appointment after lunch. Do NOT schedule at 0745, 0815 or 1245. REVIEWED      For patients 85 or older we recommend having an adult stay w/ them for the remainder of the day.   N/A    Does the patient have any questions?  NO  Patria Roman  [facility]

## 2018-09-27 NOTE — TELEPHONE ENCOUNTER
Reviewed MRI results with patient. Ongoing right neck to shoulder pain. Discussed ZOHREH vs C5-6 arthroplasty. Patient wishes to try ZOHREH first and will contact clinic if pain persists to discuss arthroplasty. Order placed for ZOHREH. Patient voiced understanding and agreement.

## 2018-09-30 NOTE — PROGRESS NOTES
[facility] - Procedure Note    Date of Visit: 10/01/2018    Procedure performed: C7-T1 interlaminar epidural steroid injection with fluoroscopic guidance  Diagnosis: Cervical spondylosis; Cervical radiculitis/radiculopathy  : Quan Vasquez DO   Anesthesia: none    Indications: Denny Herrera is a 38 year old male who is seen at the request of Lavern Velasquez CNP for cervical epidural steroid injection. The patient describes pain in the neck radiating into his right upper extremity. The patient has been exhibiting symptoms consistent with cervical intraspinal inflammation and radiculopathy. Symptoms have been persistent, disabling, and intermittently severe. The patient reports minimal improvement with conservative treatment, including oral medications and PT.    MRI OF THE CERVICAL SPINE WITHOUT CONTRAST 9/11/2018 4:27 PM     COMPARISON: None     HISTORY: Cervical radiculopathy.     TECHNIQUE: Multiplanar, multisequence MRI images of the cervical spine  were acquired without intravenous contrast.     FINDINGS: There is normal alignment of the cervical vertebrae.  Vertebral body heights of the cervical spine are normal. Marrow signal  throughout the cervical vertebrae is within normal limits. There is no  evidence for fracture or pathologic bony lesion of the cervical spine.        There is a right paracentral posterior disc herniation at the C5-C6  level. The cervical intervertebral discs are otherwise within normal  limits in height, contour and signal intensity.     The cervical spinal cord is mildly deformed by degenerative changes at  the C5-C6 level. The cervical spinal cord is otherwise normal in  contour. There is a tiny focal area of abnormal T2 signal  hyperintensity in the cervical spinal cord from the lower C5 level to  the level of the C5-C6 disc that likely represents a tiny short  segment syrinx and is likely related to deformation of the cord by the  disc herniation. There  is no other abnormal signal in the cervical  spinal cord.     Level by level:     C2-C3: There is no spinal canal or neural foraminal stenosis at this  level.     C3-C4: There is minimal facet arthropathy bilaterally. There is no  spinal canal or neural foraminal stenosis at this level.     C4-C5: There is no spinal canal or neural foraminal stenosis at this  level.     C5-C6: There is facet arthropathy bilaterally, uncinate hypertrophy  bilaterally and a posterior broad-based disc-osteophyte complex with a  superimposed moderate-sized right paracentral disc herniation  (protrusion). The herniated disc material results in moderate-severe  right foraminal stenosis and moderately indents the right anterior  surface of the cervical spinal cord. Overall spinal canal narrowing is  mild. There is no left foraminal narrowing.     C6-C7: There is no spinal canal or neural foraminal stenosis at this  level.     C7-T1: There is no spinal canal or neural foraminal stenosis at this  level.         IMPRESSION:  1. Moderate-sized right paracentral disc herniation at the C5-C6 level  that moderately indents the right anterior aspect of the cervical  spinal cord and moderate-severe right foraminal stenosis and mild  spinal canal narrowing.  2. Probable short segment syrinx in the cervical spinal cord at the  lower C5 level likely related to deformation of the cord by the  aforementioned disc herniation.  3. Otherwise, normal cervical spine MRI.     EDMUNDO LEONG MD    Allergies:      Allergies   Allergen Reactions     No Known Drug Allergies         Vitals:  BP (!) 161/92  Pulse 69  SpO2 96%    Review of Systems: The patient denies recent fever, chills, illness, use of antibiotics or anticoagulants. All other 10-point review of systems negative.     Procedure: The procedure and risks were explained, and informed written consent was obtained from the patient. Risks include but are not limited to: infection, bleeding, increased  pain, and damage to soft tissue, nerve, muscle, and vasculature structures. After getting informed consent, patient was brought into the procedure suite and was placed in a prone position on the procedure table. A Pause for the Cause was performed. Patient was prepped and draped in sterile fashion.     The C7-T1 interspace was identified with use of fluoroscopy in AP view. A 25-gauge, 1.5 inch needle was used to anesthetize the skin and subcutaneous tissue entry site with a total of 2 ml of 1% lidocaine. Under fluoroscopic visualization, a 22-gauge, 3.5 inch Tuohy epidural needle was slowly advanced towards the epidural space a few millimeters right-sided of midline. The latter part of the needle advancement was guided with fluoroscopy in the lateral view. The epidural space was identified using loss of resistance technique. After negative aspiration for heme and cerebrospinal fluid, a total of 1 mL of non-ionic contrast was injected to confirm needle placement. 9 mL of contrast was wasted. Epidurogram confirmed spread within the posterior epidural space. A solution containing 1ml of 40mg/ml of Triamcinolone and 2 ml of preservative free saline was injected. The needle was removed.  Images were saved to PACS.    The patient tolerated the procedure well, and there was no evidence of procedural complications. No new sensory or motor deficits were noted following the procedure. The patient was stable and able to ambulate on discharge home. Post-procedure instructions were provided.     Pre-procedure pain score: 2/10 in the neck, 2/10 in the arm  Post-procedure pain score: 0/10 in the neck, 0/10 in the arm    Assessment/Plan: Denny Herrera is a 38 year old male s/p cervical interlaminar epidural steroid injection today for cervical spondylosis and radiculitis/radiculopathy.     1. Following today's procedure, the patient was advised to contact the [facility] for any of the following:   Fever,  chills, or night sweats   New onset of pain, numbness, or weakness   Any questions/concerns regarding the procedure  If unable to contact the Pain Center, the patient was instructed to go to a local Emergency Room for any complications.   2. The patient will receive a follow-up call in 1 week.   3. Follow-up with the referring provider in 2 weeks for post-procedure evaluation.    Quan Vasquez DO  [facility] [place]

## 2018-10-01 ENCOUNTER — RADIOLOGY INJECTION OFFICE VISIT (OUTPATIENT)
Dept: PALLIATIVE MEDICINE | Facility: CLINIC | Age: 38
End: 2018-10-01
Payer: COMMERCIAL

## 2018-10-01 ENCOUNTER — RADIANT APPOINTMENT (OUTPATIENT)
Dept: GENERAL RADIOLOGY | Facility: CLINIC | Age: 38
End: 2018-10-01
Attending: PHYSICAL MEDICINE & REHABILITATION
Payer: COMMERCIAL

## 2018-10-01 VITALS — DIASTOLIC BLOOD PRESSURE: 92 MMHG | SYSTOLIC BLOOD PRESSURE: 161 MMHG | HEART RATE: 69 BPM | OXYGEN SATURATION: 96 %

## 2018-10-01 DIAGNOSIS — M54.12 CERVICAL RADICULOPATHY: ICD-10-CM

## 2018-10-01 DIAGNOSIS — M54.12 CERVICAL RADICULOPATHY: Primary | ICD-10-CM

## 2018-10-01 PROCEDURE — 62321 NJX INTERLAMINAR CRV/THRC: CPT | Performed by: PHYSICAL MEDICINE & REHABILITATION

## 2018-10-01 NOTE — PATIENT INSTRUCTIONS
[facility] Procedure Discharge Instructions    Today you saw:   Dr. Quan Vasquez    Your procedure:   Cervical Epidural steroid injection    Medications used:  Lidocaine (anesthetic) Kenalog (steroid)  Omnipaque (contrast)               Be cautious when walking as numbness and/or weakness in the legs may occur up to 6-8 hours after the procedure due to effect of the local anesthetic    Do not drive for 6 hours. The effect of the local anesthetic could slow your reflexes.     Avoid strenuous activity for the first 24 hours. You may resume your regular activities after that.     You may shower, however avoid swimming, tub baths or hot tubs for 24 hours following your procedure    You may have a mild to moderate increase in pain for several days following the injection.      You may use ice packs for 10-15 minutes, 3 to 4 times a day at the injection site for comfort    Do not use heat to painful areas for 6 to 8 hours. This will give the local anesthetic time to wear off and prevent you from accidentally burning your skin.    You may use anti-inflammatory medications (such as Ibuprofen/Advil or Aleve) or Tylenol for pain control if necessary    It may take up to 14 days for the steroid medication to start working although you may feel the effect as early as a few days after the procedure.     Follow up with your referring provider in 2-3 weeks      If you experience any of the following, call the pain center line during work hours at [phone] or on-call physician after hours at [phone]:  -Fever over 100 degree F  -Swelling, bleeding, redness, drainage, warmth at the injection site  -Progressive weakness or numbness in your legs or arms  -Loss of bowel or bladder function  -Unusual headache that is not relieved by Tylenol or your regular headache medication  -Unusual new onset of pain that is not improving

## 2018-10-01 NOTE — MR AVS SNAPSHOT
After Visit Summary   10/1/2018    Denny Hererra    MRN: [id number]           Patient Information     Date Of Birth          1980        Visit Information        Provider Department      10/1/2018 3:45 PM Quan Vasquez MD [facility]        Care Instructions    [facility] Procedure Discharge Instructions    Today you saw:   Dr. Quan Vasquez    Your procedure:   Cervical Epidural steroid injection    Medications used:  Lidocaine (anesthetic) Kenalog (steroid)  Omnipaque (contrast)               Be cautious when walking as numbness and/or weakness in the legs may occur up to 6-8 hours after the procedure due to effect of the local anesthetic    Do not drive for 6 hours. The effect of the local anesthetic could slow your reflexes.     Avoid strenuous activity for the first 24 hours. You may resume your regular activities after that.     You may shower, however avoid swimming, tub baths or hot tubs for 24 hours following your procedure    You may have a mild to moderate increase in pain for several days following the injection.      You may use ice packs for 10-15 minutes, 3 to 4 times a day at the injection site for comfort    Do not use heat to painful areas for 6 to 8 hours. This will give the local anesthetic time to wear off and prevent you from accidentally burning your skin.    You may use anti-inflammatory medications (such as Ibuprofen/Advil or Aleve) or Tylenol for pain control if necessary    It may take up to 14 days for the steroid medication to start working although you may feel the effect as early as a few days after the procedure.     Follow up with your referring provider in 2-3 weeks      If you experience any of the following, call the pain center line during work hours at [phone] or on-call physician after hours at [phone]:  -Fever over 100 degree F  -Swelling, bleeding, redness, drainage, warmth at the injection  site  -Progressive weakness or numbness in your legs or arms  -Loss of bowel or bladder function  -Unusual headache that is not relieved by Tylenol or your regular headache medication  -Unusual new onset of pain that is not improving            Follow-ups after your visit        Your next 10 appointments already scheduled     Oct 03, 2018  2:00 PM CDT   Return Visit with Cindy Castañeda PsyD   [facility] ([facility])    [address] [place] [address] [phone]            Oct 08, 2018  2:00 PM CDT   Return Visit with Johnny Azul MD   [facility] ([facility])    [address] [place] [id number] [phone]              Who to contact     If you have questions or need follow up information about today's clinic visit or your schedule please contact [facility] directly at [phone].  Normal or non-critical lab and imaging results will be communicated to you by [employer], letter or phone within 4 business days after the clinic has received the results. If you do not hear from us within 7 days, please contact the clinic through ????t or phone. If you have a critical or abnormal lab result, we will notify you by phone as soon as possible.  Submit refill requests through [employer] or call your pharmacy and they will forward the refill request to us. Please allow 3 business days for your refill to be completed.          Additional Information About Your Visit        [employer] Information     [employer] gives you secure access to your electronic health record. If you see a primary care provider, you can also send messages to your care team and make appointments. If you have questions, please call your primary care clinic.  If you do not have a primary care provider, please call [phone] and they will assist you.        Care EveryWhere ID     This is your Care EveryWhere ID. This could be used by  other organizations to access your [facility] medical records  [id number]        Your Vitals Were     Pulse Pulse Oximetry                73 95%           Blood Pressure from Last 3 Encounters:   10/01/18 (!) 166/110   09/10/18 (!) 144/93   09/10/18 134/90    Weight from Last 3 Encounters:   09/10/18 122.9 kg (270 lb 14.4 oz)   07/23/18 120.7 kg (266 lb)   04/09/18 121 kg (266 lb 11.2 oz)              Today, you had the following     No orders found for display       Primary Care Provider Office Phone # Fax #    Gali Del Valle [phone] [address] [place] [address]        Equal Access to Services     [facility]: Hadii andrea kim hadasho Sokerry, waaxda luqadaha, qaybta kaalmada adeegyada, hardeep mcneal . So [facility] [phone].    ATENCIÓN: Si habla español, tiene a tapia disposición servicios gratuitos de asistencia lingüística. [facility] [phone].    We comply with applicable federal civil rights laws and [place] laws. We do not discriminate on the basis of race, color, national origin, age, disability, sex, sexual orientation, or gender identity.            Thank you!     Thank you for choosing [facility]  for your care. Our goal is always to provide you with excellent care. Hearing back from our patients is one way we can continue to improve our services. Please take a few minutes to complete the written survey that you may receive in the mail after your visit with us. Thank you!             Your Updated Medication List - Protect others around you: Learn how to safely use, store and throw away your medicines at www.disposemymeds.org.          This list is accurate as of 10/1/18  3:47 PM.  Always use your most recent med list.                   Brand Name Dispense Instructions for use Diagnosis    allopurinol 300 MG tablet    ZYLOPRIM    135 tablet    TAKE 1 TABLET BY MOUTH EVERY DAY AND 1/2 TABLET BY MOUTH EVERY EVENING     Podagra       aspirin 81 MG tablet      Take 81 mg by mouth daily        buPROPion 150 MG 24 hr tablet    WELLBUTRIN XL    30 tablet    Take 1 tablet (150 mg) by mouth every morning    Attention deficit hyperactivity disorder (ADHD), predominantly inattentive type, Mild major depression (H)       cyclobenzaprine 10 MG tablet    FLEXERIL    90 tablet    Take 0.5-1 tablets (5-10 mg) by mouth 3 times daily as needed for muscle spasms    Neck muscle spasm       fenofibrate 160 MG tablet     90 tablet    TAKE 1 TABLET(160 MG) BY MOUTH DAILY    Hypertriglyceridemia       indomethacin 50 MG capsule    INDOCIN    42 capsule    Take 1 capsule (50 mg) by mouth 3 times daily (with meals)    Acute gout of left foot, unspecified cause       lisinopril 10 MG tablet    PRINIVIL/ZESTRIL    30 tablet    Take 1 tablet (10 mg) by mouth daily    Benign essential hypertension       MIRALAX powder   Generic drug:  polyethylene glycol     510 g    Take 17 g (1 capful) by mouth daily    Status post osteotomy       Multi-vitamin Tabs tablet      Take 1 tablet by mouth daily        OMEGA-3 FISH OIL PO      Take 1 capsule by mouth daily        VITAMIN D (CHOLECALCIFEROL) PO      Take by mouth daily

## 2018-10-01 NOTE — NURSING NOTE
Pre-procedure Intake    Have you been fasting? NA    If yes, for how long?     Are you taking a prescribed blood thinner such as coumadin, Plavix, Xarelto?    No    If yes, when did you take your last dose?     Do you take aspirin?  No    If cervical procedure, have you held aspirin for 6 days?   NA    Do you have any allergies to contrast dye, iodine, steroid and/or numbing medications?  NO    Are you currently taking antibiotics or have an active infection?  NO    Have you had a fever/elevated temperature within the past week? NO    Are you currently taking oral steroids? NO    Do you have a ? Yes       Are you pregnant or breastfeeding?  Not Applicable    Are the vital signs normal?  No:

## 2018-10-01 NOTE — NURSING NOTE
Discharge Information    IV Discontiued Time:  NA    Amount of Fluid Infused:  NA    Discharge Criteria = When patient returns to baseline or as per MD order    Consciousness:  Pt is fully awake    Circulation:  BP +/- 20% of pre-procedure level    Respiration:  Patient is able to breathe deeply    O2 Sat:  Patient is able to maintain O2 Sat >92% on room air    Activity:  Moves 4 extremities on command    Ambulation:  Patient is able to stand and walk or stand and pivot into wheelchair    Dressing:  Clean/dry or No Dressing    Notes:   Discharge instructions and AVS given to patient    Patient meets criteria for discharge?  YES    Admitted to PCU?  No    Responsible adult present to accompany patient home?  Yes    Signature/Title:    Louise Fatima RN Care Coordinator  [facility]

## 2018-10-03 ENCOUNTER — OFFICE VISIT (OUTPATIENT)
Dept: PSYCHOLOGY | Facility: CLINIC | Age: 38
End: 2018-10-03
Payer: COMMERCIAL

## 2018-10-03 DIAGNOSIS — F32.0 MAJOR DEPRESSIVE DISORDER, SINGLE EPISODE, MILD (H): Primary | ICD-10-CM

## 2018-10-03 PROCEDURE — 90834 PSYTX W PT 45 MINUTES: CPT | Performed by: PSYCHOLOGIST

## 2018-10-03 ASSESSMENT — ANXIETY QUESTIONNAIRES
7. FEELING AFRAID AS IF SOMETHING AWFUL MIGHT HAPPEN: NOT AT ALL
GAD7 TOTAL SCORE: 2
IF YOU CHECKED OFF ANY PROBLEMS ON THIS QUESTIONNAIRE, HOW DIFFICULT HAVE THESE PROBLEMS MADE IT FOR YOU TO DO YOUR WORK, TAKE CARE OF THINGS AT HOME, OR GET ALONG WITH OTHER PEOPLE: NOT DIFFICULT AT ALL
2. NOT BEING ABLE TO STOP OR CONTROL WORRYING: NOT AT ALL
3. WORRYING TOO MUCH ABOUT DIFFERENT THINGS: NOT AT ALL
5. BEING SO RESTLESS THAT IT IS HARD TO SIT STILL: NOT AT ALL
1. FEELING NERVOUS, ANXIOUS, OR ON EDGE: SEVERAL DAYS
6. BECOMING EASILY ANNOYED OR IRRITABLE: SEVERAL DAYS

## 2018-10-03 ASSESSMENT — PATIENT HEALTH QUESTIONNAIRE - PHQ9: 5. POOR APPETITE OR OVEREATING: NOT AT ALL

## 2018-10-03 NOTE — Clinical Note
Greetings Dr. Del Valle,   I saw the patient for a 6 week follow up today and he appears to have made great improvements since starting the Wellbutrin. Thank you for the care that you have provided him. I do not plan to see him again at this time.   Thanks,  Cindy Castañeda PsyD

## 2018-10-03 NOTE — PROGRESS NOTES
"                                             Progress Note    Client Name: Denny Herrera  Date: 10/3/2018          Service Type: Individual (ADHD 6 week follow up)      Session Start Time: 2:00PM Session End Time: 2:50PM      Session Length: 50 minutes     Session #: 6 week follow up     Attendees: Client attended alone      PHQ-9 / JAMIE-7 : 2/2     DATA           Episode of Care Goals: Achieved / completed to satisfaction - MAINTENANCE (Working to maintain change, with risk of relapse); Intervened by continuing to positively reinforce healthy behavior choice      Current / Ongoing Stressors and Concerns:   The client has had struggles with task completion and distractions. He reported that it was nearly impossible for him to recall names. He accepted a position at his \"dream company\" and works hard to meet his bonuses, so that his wife can stay home to care for their three children.                           The client reports that his symptoms of Depression and inattention have improved since our last session. He has gone on Wellbutrin and implemented some behavior changes.      Treatment Objective(s) Addressed in This Session:   Discussed his decision to go on Wellbutrin and implementation of behavior changes. Reviewed homework assigned last time, such as taking quite time on his drive home from work. The client talked about him recognizing early signs of depression and forcing himself to engage with others as opposed to isolating. He stated that now that he feels \"better\" he can recognize when depression \"starts.\" He stated that he had \"one episode of depression that lasted for about two hours\" in the past 4 weeks.      Intervention:   CBT: positive reinforcement  Emotion Focused Therapy: emotion checking  Motivational Interviewing: open ended questions                          ADHD assessment follow-up session. Client reported feeling less depressed and is able to focus more. I encouraged him to take his " medications as prescribed. He also talked about some anxiety about a potential future neck/back surgery, but said that he is looking forward to having less pain in the future.     The client reported that his wife is being supportive of his decision to take medication.     ASSESSMENT: Current Emotional / Mental Status (status of significant symptoms):   Risk status (Self / Other harm or suicidal ideation)   Client denies current fears or concerns for personal safety.   Client denies current or recent suicidal ideation or behaviors.   Client denies current or recent homicidal ideation or behaviors.   Client denies current or recent self injurious behavior or ideation.   Client denies other safety concerns.   Client Client reports there has been no change in risk factors since their last session.     Client Client reports there has been no change in protective factors since their last session.     A safety and risk management plan has not been developed at this time, however client was given the after-hours number / 911 should there be a change in any of these risk factors.     Appearance:   Appropriate    Eye Contact:   Good    Psychomotor Behavior: Normal    Attitude:   Cooperative    Orientation:   All   Speech    Rate / Production: Normal     Volume:  Normal    Mood:    Normal   Affect:    Appropriate    Thought Content:  Clear    Thought Form:  Coherent  Logical    Insight:    Good      Medication Review:   No current psychiatric medications prescribed     Medication Compliance:   Yes to physical health medications     Changes in Health Issues:   Yes: neck and back     Chemical Use Review:   Substance Use: Chemical use reviewed, no active concerns identified      Tobacco Use: No current tobacco use.       Collateral Reports Completed:   Routed note to PCP    PLAN: (Client Tasks / Therapist Tasks / Other)  The client plans to continue taking his medications as prescribed and will contact [facility] or myself if he would  like to attend individual therapy or would like recommendations for other therapists.       If I see the client again, I will assess if the depression diagnosis should be moved to in remission.         Cindy Casatñeda PsyD LP 10/3/2018

## 2018-10-04 ASSESSMENT — ANXIETY QUESTIONNAIRES: GAD7 TOTAL SCORE: 2

## 2018-10-04 ASSESSMENT — PATIENT HEALTH QUESTIONNAIRE - PHQ9: SUM OF ALL RESPONSES TO PHQ QUESTIONS 1-9: 2

## 2018-10-08 ENCOUNTER — HOSPITAL ENCOUNTER (OUTPATIENT)
Facility: CLINIC | Age: 38
End: 2018-10-08
Attending: NEUROLOGICAL SURGERY | Admitting: NEUROLOGICAL SURGERY
Payer: COMMERCIAL

## 2018-10-08 ENCOUNTER — OFFICE VISIT (OUTPATIENT)
Dept: NEUROSURGERY | Facility: CLINIC | Age: 38
End: 2018-10-08
Attending: NEUROLOGICAL SURGERY
Payer: COMMERCIAL

## 2018-10-08 ENCOUNTER — TELEPHONE (OUTPATIENT)
Dept: PALLIATIVE MEDICINE | Facility: CLINIC | Age: 38
End: 2018-10-08

## 2018-10-08 VITALS
SYSTOLIC BLOOD PRESSURE: 152 MMHG | HEART RATE: 81 BPM | DIASTOLIC BLOOD PRESSURE: 100 MMHG | OXYGEN SATURATION: 98 % | TEMPERATURE: 98.6 F

## 2018-10-08 DIAGNOSIS — M54.12 CERVICAL RADICULOPATHY: Primary | ICD-10-CM

## 2018-10-08 PROCEDURE — G0463 HOSPITAL OUTPT CLINIC VISIT: HCPCS

## 2018-10-08 PROCEDURE — 99214 OFFICE O/P EST MOD 30 MIN: CPT | Mod: 57 | Performed by: NEUROLOGICAL SURGERY

## 2018-10-08 ASSESSMENT — PAIN SCALES - GENERAL: PAINLEVEL: MODERATE PAIN (5)

## 2018-10-08 NOTE — PATIENT INSTRUCTIONS
Surgery scheduled at [facility] for C5-6 Arthroplasty    Pre-Operative:  -Surgical risks: blood clots in the leg or lung, problems urinating, nerve damage, drainage from the incision, infection, stiffness  - Pre-operative physical with primary care physician within 30 days of surgical date.   -Stop all foods and liquids 8 hours prior to surgery.  -Shower procedure: please shower with antibacterial soap the night before surgery and morning of surgery. Refer to information sheet in folder.   - Discontinue Aspirin, NSAIDs (Advil, Ibuprofen, Naproxen, Nuprin, Diclofenac, Meloxicam, Aleve, Celebrex) x 7 days prior to surgical date. After surgery, do not begin taking these medications until given clearance.  - May try Tylenol for pain.    Post-Operative:  -Same day procedure  - Post operative pain  will require pain medications and muscle relaxants. You will receive medication upon discharge.  -Do NOT drive while taking narcotic pain medication.  -Post operative incision care-    -Watch for signs of infection: redness, swelling, warmth, drainage, and fever of 101 degrees or higher. Notify clinic [phone].   -Keep incision clean and dry. You may shower. No submerging incision in water such as pools, hot tubs, baths for at least 8 weeks or until incision is healed.   - Post operative activity limitations for 4-6 weeks after surgery: no lifting > 10 pounds; limited bending, twisting, and overhead reaching. You will be re-evaluated at your follow up appointments.   -If you are currently employed, you will need to be off work for recovery and healing. Please fax any FMLA/short term disability paperwork to [phone]. You may call our clinic when you'd like to return to work and we can provide a work letter.   - Follow up appointments: 6 week post op and 3 months post op with xray prior to appointment. Please call to schedule follow up appointment at [phone].

## 2018-10-08 NOTE — TELEPHONE ENCOUNTER
Patient had a C7-T1 interlaminar epidural steroid injection on 8/1/18.  Called patient for an update.      Left message that we were calling for an update about how s/he was doing after the injection.  LM that if he has any problems or questions to call the clinic at [phone].

## 2018-10-08 NOTE — NURSING NOTE
"Denny Herrera is a 38 year old male who presents for:  Chief Complaint   Patient presents with     Neurologic Problem     follow up cervical radiculopathy, discuss surgery        Vitals:    There were no vitals filed for this visit.    BMI:  Estimated body mass index is 38.32 kg/(m^2) as calculated from the following:    Height as of 9/10/18: 5' 10.5\" (1.791 m).    Weight as of 9/10/18: 270 lb 14.4 oz (122.9 kg).    Pain Score:  Data Unavailable        Funmi Ward, DENISSE, AAS      "

## 2018-10-08 NOTE — NURSING NOTE
Patient Education    Education included but not limited to:  - Surgical risks: blood clots, urinating difficulties, nerve damage, infection.  - Pre-operative physical with primary care physician within 30 days of surgical date.   - Pre-operative clearance from other pertaining specialties.   - Discontinue NSAIDS x 7 days prior to surgical date.   -Do not begin taking NSAIDs (Advil, Motrin, Ibuprofen, Nuprin, Diclofenac, Meloxicam, Aleve, Celebrex, Aspirin, etc.) until 6 weeks after surgery if you had a fusion. May cause bleeding and interfere with bone healing.    -May try Tylenol for pain.    -Discussed being off work after surgery, short term disability, FMLA, etc.   -Forms to be completed    -Pre-op timeline: NPO, shower, medications    -Hospital stay: Checking in, surgery, recovery room, hospital room.    - Post operative pain management: narcotics, muscle relaxants, ice, etc.   -No driving while taking narcotics     -Post operative incision care:   Keep your incision clean and dry.   Okay to shower. No submerging in water until incision healed.   Watch for signs of infection and notify clinic if drainage or fever develops.   - Post operative activity limitations recommended until follow up appointment: no lifting > 10 pounds; limited bending, twisting, overhead reaching.  -If a brace is required per Dr. Azul, [facility] will fit you for the brace in the hospital.  - Follow up appointments: 6 week post op, 3 months post op. You will need to an xray before each appointment. Please call to schedule follow up appointment at [phone].   - Education book was also given to the patient for further review.      Patient verbalized understanding of above instructions. All questions were answered to the best of my ability and the patient's satisfaction. Patient advised to call with any additional questions or concerns.

## 2018-10-08 NOTE — PROGRESS NOTES
"Denny Herrera is a 38 year old male who presents for evaluation of neck and right shoulder pain since last week. He has had similar sypmtoms in the past 10 years; usually 3-4 times per year. Pain is located in right neck and radiates into right shoulder. Describes the pain as a constant ache with intermittent sensation of \"pushing\" into his shoulder. Pain is worsened with movement of the neck. Pain is alleviated with lying flat. He has been doing physical therapy without lasting relief. He has also been taking flexeril and ibuprofen, which is only minimally helpful. No recent imaging or injections. Denies paresthesias into extremities at current (he has had one episode in the past into his right arm) or bladder/bowel incontinence.    Returns for follow up.  Continued pain as above.  PT and ECHO without improvement.    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     Raynaud disease      Raynaud's syndrome 3/14/2014       Past Medical History reviewed with patient during visit.    Past Surgical History:   Procedure Laterality Date     ARTHROSCOPY KNEE Right 6/15/2016    Procedure: ARTHROSCOPY KNEE;  Surgeon: Tim Valdez MD;  [facility]     ARTHROTOMY WITH FRESH OSTEOCHONDRAL ALLOGRAFT KNEE Right 6/15/2016    Procedure: ARTHROTOMY WITH FRESH OSTEOCHONDRAL ALLOGRAFT KNEE;  Surgeon: Tim Valdez MD;  [facility]     C NONSPECIFIC PROCEDURE  1998    7 surgeries total on both knees. 2 for R knee, 3 L knee, ACL and meniscus arthroscopic procedures x 3, 2 open surgery     COLONOSCOPY N/A " 8/19/2015    Procedure: COLONOSCOPY;  Surgeon: Timbo Greenberg MD;  Location:  GI     ORTHOPEDIC SURGERY       OSTEOTOMY TIBIA Right 6/15/2016    Procedure: OSTEOTOMY TIBIA;  Surgeon: Tim Valdez MD;  [facility]     REMOVE HARDWARE KNEE Right 6/15/2016    Procedure: REMOVE HARDWARE KNEE;  Surgeon: Tim Valdez MD;  [facility]     Past Surgical History reviewed with patient during visit.    Current Outpatient Prescriptions   Medication     allopurinol (ZYLOPRIM) 300 MG tablet     aspirin 81 MG tablet     buPROPion (WELLBUTRIN XL) 150 MG 24 hr tablet     cyclobenzaprine (FLEXERIL) 10 MG tablet     fenofibrate 160 MG tablet     indomethacin (INDOCIN) 50 MG capsule     lisinopril (PRINIVIL/ZESTRIL) 10 MG tablet     multivitamin, therapeutic with minerals (MULTI-VITAMIN) TABS     Omega-3 Fatty Acids (OMEGA-3 FISH OIL PO)     polyethylene glycol (MIRALAX) powder     VITAMIN D, CHOLECALCIFEROL, PO     No current facility-administered medications for this visit.        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[employer]     Social History Main Topics     Smoking status: Never Smoker     Smokeless tobacco: Never Used     Alcohol use 0.0 - 1.0 oz/week     0 - 2 Standard drinks or equivalent per week      Comment:  rarely (up to 4 beers a month)     Drug use: No     Sexual activity: Yes     Partners: Female      Comment: vasectomy     Other Topics Concern     Not on file     Social History Narrative       Family History   Problem Relation Age of Onset     Family History Negative Other      neg for RA, IBD, psoriasis, does not know much about his father side of his family, neg for gout     Attention Deficit Disorder Other      Arthritis Mother      back DJD     Alcohol/Drug Mother       Depression Mother      Endocrine Disease Mother      Anxiety Disorder Mother      Back Pain Mother      Arthritis Brother      back DJD     HEART DISEASE Brother      MI at 40     Hypertension Brother      Bipolar Disorder Brother      Arthritis Maternal Grandmother      back DJD     Circulatory Maternal Grandmother      Eye Disorder Maternal Grandmother      Back Pain Maternal Grandmother      Cancer Maternal Aunt      ?type     Hypertension Brother      Circulatory Maternal Grandfather      Eye Disorder Maternal Grandfather      Hypertension Other      maternal grandparents     Depression Other      Alcohol/Drug Brother      Back Pain Brother      HEART DISEASE Father      HEART DISEASE Paternal Grandmother           ROS: 10 point ROS neg other than the symptoms noted above in the HPI.    Vital Signs: BP (!) 152/100  Pulse 81  Temp 98.6  F (37  C) (Oral)  SpO2 98%    Examination:  Constitutional:  Alert, well nourished, NAD.  HEENT: Normocephalic, atraumatic.   Pulmonary:  Without shortness of breath, normal effort.   Lymph: no lymphadenopathy to low back or LE.   Integumentary: Skin is free of rashes or lesions.   Cardiovascular:  No pitting edema of BLE.      Neurological:  Awake  Alert  Oriented x 3  Speech clear  Cranial nerves II - XII grossly intact  PERRL  EOMI  Face symmetric  Tongue midline  Motor exam   Shoulder Abduction:  Right:  5/5   Left:  5/5  Biceps:                      Right:  5/5   Left:  5/5  Triceps:                     Right:  5/5   Left:  5/5  Wrist Extensors:       Right:  5/5   Left:  5/5  Wrist Flexors:           Right:  5/5   Left:  5/5  Intrinsics:                   Right:  5/5   Left:  5/5   Sensation normal to bilateral upper extremities.    Reflexes are 2+ in the brachial radialis and triceps. Negative Shady sign bilaterally.  Musculoskeletal:  Gait: Able to stand from a seated position. Normal non-antalgic, non-myelopathic gait.  Able to heel/toe walk without loss of  balance  Cervical examination reveals good range of motion with increase in pain with rotation.  No tenderness to palpation of the cervical spine or paraspinous muscles bilaterally.     +Hawkin's on the right  No pain with raising arms above head  - cross body adduction of R shoulder    Imaging:   None    Assessment/Plan:     Will plan for C5-6 arthroplasty  Risks and benefits discussed

## 2018-10-08 NOTE — MR AVS SNAPSHOT
After Visit Summary   10/8/2018    Denny Herrera    MRN: [id number]           Patient Information     Date Of Birth          1980        Visit Information        Provider Department      10/8/2018 2:00 PM Johnny Azul MD [facility]        Care Instructions    Surgery scheduled at [facility] for C5-6 Arthroplasty    Pre-Operative:  -Surgical risks: blood clots in the leg or lung, problems urinating, nerve damage, drainage from the incision, infection, stiffness  - Pre-operative physical with primary care physician within 30 days of surgical date.   -Stop all foods and liquids 8 hours prior to surgery.  -Shower procedure: please shower with antibacterial soap the night before surgery and morning of surgery. Refer to information sheet in folder.   - Discontinue Aspirin, NSAIDs (Advil, Ibuprofen, Naproxen, Nuprin, Diclofenac, Meloxicam, Aleve, Celebrex) x 7 days prior to surgical date. After surgery, do not begin taking these medications until given clearance.  - May try Tylenol for pain.    Post-Operative:  - Overnight hospitalization stay  - Post operative pain  will require pain medications and muscle relaxants. You will receive medication upon discharge.  -Do NOT drive while taking narcotic pain medication.  -Post operative incision care-    -Watch for signs of infection: redness, swelling, warmth, drainage, and fever of 101 degrees or higher. Notify clinic [phone].   -Keep incision clean and dry. You may shower. No submerging incision in water such as pools, hot tubs, baths for at least 8 weeks or until incision is healed.   - Post operative activity limitations for 4-6 weeks after surgery: no lifting > 10 pounds; limited bending, twisting, and overhead reaching. You will be re-evaluated at your follow up appointments.   -If you are currently employed, you will need to be off work for recovery and healing. Please fax any FMLA/short term  disability paperwork to [phone]. You may call our clinic when you'd like to return to work and we can provide a work letter.   - Follow up appointments: 6 week post op and 3 months post op with xray prior to appointment. Please call to schedule follow up appointment at [phone].                Follow-ups after your visit        Who to contact     If you have questions or need follow up information about today's clinic visit or your schedule please contact [facility] directly at [phone].  Normal or non-critical lab and imaging results will be communicated to you by [employer], letter or phone within 4 business days after the clinic has received the results. If you do not hear from us within 7 days, please contact the clinic through [employer] or phone. If you have a critical or abnormal lab result, we will notify you by phone as soon as possible.  Submit refill requests through [employer] or call your pharmacy and they will forward the refill request to us. Please allow 3 business days for your refill to be completed.          Additional Information About Your Visit        [employer] Information     [employer] gives you secure access to your electronic health record. If you see a primary care provider, you can also send messages to your care team and make appointments. If you have questions, please call your primary care clinic.  If you do not have a primary care provider, please call [phone] and they will assist you.        Care EveryWhere ID     This is your Care EveryWhere ID. This could be used by other organizations to access your [place] medical records  [id number]        Your Vitals Were     Pulse Temperature Pulse Oximetry             81 98.6  F (37  C) (Oral) 98%          Blood Pressure from Last 3 Encounters:   10/08/18 (!) 152/100   10/01/18 (!) 161/92   09/10/18 (!) 144/93    Weight from Last 3 Encounters:   09/10/18 270 lb 14.4 oz (122.9 kg)   07/23/18 266 lb (120.7 kg)    04/09/18 266 lb 11.2 oz (121 kg)              Today, you had the following     No orders found for display       Primary Care Provider Office Phone # Fax #    Gali Del Valle [phone] [address] [place] [phone]        Equal Access to Services     [facility]: Hadii aad ku hadasho Soomaali, waaxda luqadaha, qaybta kaalmada adeegyada, waxay idiin hayaan adecooper chiragmiguel ángel lachristy . So [facility] [phone].    ATENCIÓN: Si habla español, tiene a tapia disposición servicios gratuitos de asistencia lingüística. Llame al [phone].    We comply with applicable federal civil rights laws and [place] laws. We do not discriminate on the basis of race, color, national origin, age, disability, sex, sexual orientation, or gender identity.            Thank you!     Thank you for choosing [facility]  for your care. Our goal is always to provide you with excellent care. Hearing back from our patients is one way we can continue to improve our services. Please take a few minutes to complete the written survey that you may receive in the mail after your visit with us. Thank you!             Your Updated Medication List - Protect others around you: Learn how to safely use, store and throw away your medicines at www.disposemymeds.org.          This list is accurate as of 10/8/18  2:20 PM.  Always use your most recent med list.                   Brand Name Dispense Instructions for use Diagnosis    allopurinol 300 MG tablet    ZYLOPRIM    135 tablet    TAKE 1 TABLET BY MOUTH EVERY DAY AND 1/2 TABLET BY MOUTH EVERY EVENING    Podagra       aspirin 81 MG tablet      Take 81 mg by mouth daily        buPROPion 150 MG 24 hr tablet    WELLBUTRIN XL    30 tablet    Take 1 tablet (150 mg) by mouth every morning    Attention deficit hyperactivity disorder (ADHD), predominantly inattentive type, Mild major depression (H)       cyclobenzaprine 10 MG tablet    FLEXERIL     90 tablet    Take 0.5-1 tablets (5-10 mg) by mouth 3 times daily as needed for muscle spasms    Neck muscle spasm       fenofibrate 160 MG tablet     90 tablet    TAKE 1 TABLET(160 MG) BY MOUTH DAILY    Hypertriglyceridemia       indomethacin 50 MG capsule    INDOCIN    42 capsule    Take 1 capsule (50 mg) by mouth 3 times daily (with meals)    Acute gout of left foot, unspecified cause       lisinopril 10 MG tablet    PRINIVIL/ZESTRIL    30 tablet    Take 1 tablet (10 mg) by mouth daily    Benign essential hypertension       MIRALAX powder   Generic drug:  polyethylene glycol     510 g    Take 17 g (1 capful) by mouth daily    Status post osteotomy       Multi-vitamin Tabs tablet      Take 1 tablet by mouth daily        OMEGA-3 FISH OIL PO      Take 1 capsule by mouth daily        VITAMIN D (CHOLECALCIFEROL) PO      Take by mouth daily

## 2018-10-08 NOTE — LETTER
"    10/8/2018         RE: Denny Herrera  [address] [address] [address]        Dear Colleague,    Thank you for referring your patient, Denny Herrera, to the [facility]OS[facility]ERY [facility]IC. Please see a copy of my visit note below.    Denny Herrera is a 38 year old male who presents for evaluation of neck and right shoulder pain since last week. He has had similar sypmtoms in the past 10 years; usually 3-4 times per year. Pain is located in right neck and radiates into right shoulder. Describes the pain as a constant ache with intermittent sensation of \"pushing\" into his shoulder. Pain is worsened with movement of the neck. Pain is alleviated with lying flat. He has been doing physical therapy without lasting relief. He has also been taking flexeril and ibuprofen, which is only minimally helpful. No recent imaging or injections. Denies paresthesias into extremities at current (he has had one episode in the past into his right arm) or bladder/bowel incontinence.    Returns for follow up.  Continued pain as above.  PT and ECHO without improvement.    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     Raynaud disease      Raynaud's syndrome 3/14/2014       Past Medical History reviewed with patient during visit.    Past Surgical History:   Procedure Laterality Date     ARTHROSCOPY KNEE Right 6/15/2016    Procedure: ARTHROSCOPY KNEE;  Surgeon: Tim Valdez MD;  [facility]     ARTHROTOMY WITH FRESH OSTEOCHONDRAL ALLOGRAFT KNEE Right 6/15/2016    Procedure: ARTHROTOMY WITH FRESH " OSTEOCHONDRAL ALLOGRAFT KNEE;  Surgeon: Tim Valdez MD;  [facility]     C NONSPECIFIC PROCEDURE  1998    7 surgeries total on both knees. 2 for R knee, 3 L knee, ACL and meniscus arthroscopic procedures x 3, 2 open surgery     COLONOSCOPY N/A 8/19/2015    Procedure: COLONOSCOPY;  Surgeon: Timbo Greenberg MD;  Location:  GI     ORTHOPEDIC SURGERY       OSTEOTOMY TIBIA Right 6/15/2016    Procedure: OSTEOTOMY TIBIA;  Surgeon: Tim Valdez MD;  [facility]     REMOVE HARDWARE KNEE Right 6/15/2016    Procedure: REMOVE HARDWARE KNEE;  Surgeon: Tim Valdez MD;  [facility]     Past Surgical History reviewed with patient during visit.    Current Outpatient Prescriptions   Medication     allopurinol (ZYLOPRIM) 300 MG tablet     aspirin 81 MG tablet     buPROPion (WELLBUTRIN XL) 150 MG 24 hr tablet     cyclobenzaprine (FLEXERIL) 10 MG tablet     fenofibrate 160 MG tablet     indomethacin (INDOCIN) 50 MG capsule     lisinopril (PRINIVIL/ZESTRIL) 10 MG tablet     multivitamin, therapeutic with minerals (MULTI-VITAMIN) TABS     Omega-3 Fatty Acids (OMEGA-3 FISH OIL PO)     polyethylene glycol (MIRALAX) powder     VITAMIN D, CHOLECALCIFEROL, PO     No current facility-administered medications for this visit.        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[employer]      [employer]     Social History Main Topics     Smoking status: Never Smoker     Smokeless tobacco: Never Used     Alcohol use 0.0 - 1.0 oz/week     0 - 2 Standard drinks or equivalent per week      Comment:  rarely (up to 4 beers a month)     Drug use: No     Sexual activity: Yes     Partners: Female      Comment: vasectomy     Other Topics Concern     Not on file     Social History Narrative       Family History   Problem  Relation Age of Onset     Family History Negative Other      neg for RA, IBD, psoriasis, does not know much about his father side of his family, neg for gout     Attention Deficit Disorder Other      Arthritis Mother      back DJD     Alcohol/Drug Mother      Depression Mother      Endocrine Disease Mother      Anxiety Disorder Mother      Back Pain Mother      Arthritis Brother      back DJD     HEART DISEASE Brother      MI at 40     Hypertension Brother      Bipolar Disorder Brother      Arthritis Maternal Grandmother      back DJD     Circulatory Maternal Grandmother      Eye Disorder Maternal Grandmother      Back Pain Maternal Grandmother      Cancer Maternal Aunt      ?type     Hypertension Brother      Circulatory Maternal Grandfather      Eye Disorder Maternal Grandfather      Hypertension Other      maternal grandparents     Depression Other      Alcohol/Drug Brother      Back Pain Brother      HEART DISEASE Father      HEART DISEASE Paternal Grandmother           ROS: 10 point ROS neg other than the symptoms noted above in the HPI.    Vital Signs: BP (!) 152/100  Pulse 81  Temp 98.6  F (37  C) (Oral)  SpO2 98%    Examination:  Constitutional:  Alert, well nourished, NAD.  HEENT: Normocephalic, atraumatic.   Pulmonary:  Without shortness of breath, normal effort.   Lymph: no lymphadenopathy to low back or LE.   Integumentary: Skin is free of rashes or lesions.   Cardiovascular:  No pitting edema of BLE.      Neurological:  Awake  Alert  Oriented x 3  Speech clear  Cranial nerves II - XII grossly intact  PERRL  EOMI  Face symmetric  Tongue midline  Motor exam   Shoulder Abduction:  Right:  5/5   Left:  5/5  Biceps:                      Right:  5/5   Left:  5/5  Triceps:                     Right:  5/5   Left:  5/5  Wrist Extensors:       Right:  5/5   Left:  5/5  Wrist Flexors:           Right:  5/5   Left:  5/5  Intrinsics:                   Right:  5/5   Left:  5/5   Sensation normal to bilateral upper  extremities.    Reflexes are 2+ in the brachial radialis and triceps. Negative Shady sign bilaterally.  Musculoskeletal:  Gait: Able to stand from a seated position. Normal non-antalgic, non-myelopathic gait.  Able to heel/toe walk without loss of balance  Cervical examination reveals good range of motion with increase in pain with rotation.  No tenderness to palpation of the cervical spine or paraspinous muscles bilaterally.     +Hawkin's on the right  No pain with raising arms above head  - cross body adduction of R shoulder    Imaging:   None    Assessment/Plan:     Will plan for C5-6 arthroplasty  Risks and benefits discussed      Again, thank you for allowing me to participate in the care of your patient.        Sincerely,        Johnny Azul MD

## 2018-10-09 NOTE — TELEPHONE ENCOUNTER
Pt is returning call after injection. He is reporting symptoms since getting the injection:  Weakness while walking, feet and hands are super hot, back and shoulders were really red (sunburnt looking), light headedness, dizziness, and excessive sweating.      Please call [phone] (home)       Lulu KAUFFMAN    [facility]

## 2018-10-09 NOTE — TELEPHONE ENCOUNTER
Called patient and relayed info below.  He verbalized understanding of recommendations below.    Falguni DELGADON, RN Care Coordinator  [facility]

## 2018-10-09 NOTE — TELEPHONE ENCOUNTER
Patient has some symptoms consistent with post dural puncture headache, this typically improves within one week. Would recommend rest as tolerated, fluids and oral caffeine as tolerated.     Other symptoms are not consistent with this and if significantly bothersome he should go to the ED. If weakness is persistent currently, would recommend going to the ED as well. Overall, generalized, weakness in addition to his other symptoms may be more indicative of a viral process. Again would recommend that he goes to the ED for further workup.    Quan Vasquez,   [facility]

## 2018-10-09 NOTE — TELEPHONE ENCOUNTER
"Called patient. He states that the first night after injection it looked he was sunburn on chest/shoulders- explained that this can be related to steroid SE- he states this has resolved.   States that since injection, started the first day and has occurred since, he gets really hot when sitting-he begins to sweat, his palms get splotchy. He started having dizzy spells today and states that he tried to get up and walk and his legs feel \"wobbly\", he states that overall he feels weak. He states that 4 days post injection [place], he developed what he describes as an intense HA and neck pain. This has no been ongoing. States that he felt \"fine this morning\" but that by 11am he began to feel symptoms again. Denied fever and has actually  Taken his temp. Denied new tingling, states that he has baseline numbness to right hand-4th and 5th digit. He states that he feels better laying vs standing or sitting. He is taking in fluids. Advised that for the time being he should continue to lay, drink fluids, have something with caffeine. Advised that will route to provider and contact him back with recommendations.      He also inquired about workability. States that he is unable to work like this. He has surgery at end of the month, Advised that this is something he would need to discuss with surgery or PCP    Falguni DEY, RN Care Coordinator  [facility]    "

## 2018-10-15 ENCOUNTER — TELEPHONE (OUTPATIENT)
Dept: PALLIATIVE MEDICINE | Facility: CLINIC | Age: 38
End: 2018-10-15

## 2018-10-15 ENCOUNTER — TRANSFERRED RECORDS (OUTPATIENT)
Dept: HEALTH INFORMATION MANAGEMENT | Facility: CLINIC | Age: 38
End: 2018-10-15

## 2018-10-15 NOTE — TELEPHONE ENCOUNTER
Received call from patient who states that he his still experiencing symptoms post - ZOHREH. He states when he's standing or sitting he has headaches, neck hurts, dizziness. This past weekend he states that he felt like everything was tilted 45 degrees until he laid down. He states that his symptoms go away when he lays flat and get much worse when he sits or stand. Please call. Phone #[phone]      Patria Roman    [facility]

## 2018-10-15 NOTE — TELEPHONE ENCOUNTER
Called patient. He reiterated details of conversation we has on 10/08 (see encounter 10/08/18).  He states that he did not go to ER after our conversation as he misunderstood and though that he only should go if things worsened. He states that these symptoms were not mentioned at his OV with neurosurgeon. He states that since 10/08/ this weekend he experienced a sensation that things were tilted 45 degrees, he states that he is still feeling hot when sitting, states that he feels relief when laying. He states that he rested, laid flat and is drinking fluids caffeine with no resolution. Advised as symptoms have no resolved and he did have worsening or new symptoms(tilting sensation) this would warrant assessment as advised on 10/08/18. Advised that he needs assessment by ER/UC and he should contact neurosurgeon office as well.     Routing to neurosurgeon as FYI as patient has upcoming surgery scheduled.     Falguni DELGADON, RN Care Coordinator  [facility]

## 2018-10-16 NOTE — TELEPHONE ENCOUNTER
Called [facility] as had not received note yet. Melony states that patient was seen at [facility] yesterday. There was no urgent findings otherwise patient would have been sent to the ER. States that patient will be sent for selective nerve root block.     Routing as FYI to provider. Ok to close encounter when reviewed    Falguni DEY, RN Care Coordinator  [facility]

## 2018-10-16 NOTE — TELEPHONE ENCOUNTER
Received call from Dr Azul's office. They stated that patient called them to indicate that he went to [facility] UC yesterday.  Unsure of the findings. Explained that encounter was routed to Dr Azul as FYI as patient is scheduled for an upcoming surgery with them, not for manangement of potential leak. Advised that we do not follow this patient clinically and that he was advised x2 to go to the ED for assessment (s/p ZOHREH 10/01/18), we would be looking to verify if he had complications from injection itself.   Called [facility] and they will be faxing report from yesterdays UC visit     Falguni DELGADON, RN Care Coordinator  [facility]

## 2018-10-23 ENCOUNTER — HOSPITAL ENCOUNTER (OUTPATIENT)
Facility: CLINIC | Age: 38
End: 2018-10-23
Attending: ORTHOPAEDIC SURGERY | Admitting: ORTHOPAEDIC SURGERY
Payer: COMMERCIAL

## 2018-10-23 ENCOUNTER — TELEPHONE (OUTPATIENT)
Dept: NEUROSURGERY | Facility: CLINIC | Age: 38
End: 2018-10-23

## 2018-10-23 RX ORDER — CEFAZOLIN SODIUM IN 0.9 % NACL 3 G/100 ML
3 INTRAVENOUS SOLUTION, PIGGYBACK (ML) INTRAVENOUS
Status: CANCELLED | OUTPATIENT
Start: 2018-10-23

## 2018-10-23 RX ORDER — CEFAZOLIN SODIUM 1 G/3ML
1 INJECTION, POWDER, FOR SOLUTION INTRAMUSCULAR; INTRAVENOUS SEE ADMIN INSTRUCTIONS
Status: CANCELLED | OUTPATIENT
Start: 2018-10-23

## 2018-10-23 NOTE — TELEPHONE ENCOUNTER
Type of surgery: C5-6 Arthroplasty   Location of surgery: [facility]  Date and time of surgery: 10/330/2018 at 1:15PM  Surgeon: Dr. Azul   Pre-Op Appt Date: Discussed  Post-Op Appt Date: 12/12/2018   Packet sent out: Yes  Pre-cert/Authorization completed:  Yes  Date: 10/23/2018

## 2018-10-25 ENCOUNTER — OFFICE VISIT (OUTPATIENT)
Dept: PEDIATRICS | Facility: CLINIC | Age: 38
End: 2018-10-25
Payer: COMMERCIAL

## 2018-10-25 VITALS
DIASTOLIC BLOOD PRESSURE: 84 MMHG | OXYGEN SATURATION: 98 % | WEIGHT: 259.5 LBS | TEMPERATURE: 97.5 F | HEIGHT: 71 IN | BODY MASS INDEX: 36.33 KG/M2 | SYSTOLIC BLOOD PRESSURE: 132 MMHG | HEART RATE: 74 BPM

## 2018-10-25 DIAGNOSIS — I10 BENIGN ESSENTIAL HYPERTENSION: ICD-10-CM

## 2018-10-25 DIAGNOSIS — M54.12 CERVICAL RADICULOPATHY: ICD-10-CM

## 2018-10-25 DIAGNOSIS — Z82.49 FAMILY HISTORY OF ISCHEMIC HEART DISEASE: ICD-10-CM

## 2018-10-25 DIAGNOSIS — F90.0 ATTENTION DEFICIT HYPERACTIVITY DISORDER (ADHD), PREDOMINANTLY INATTENTIVE TYPE: ICD-10-CM

## 2018-10-25 DIAGNOSIS — Z01.818 PREOP GENERAL PHYSICAL EXAM: Primary | ICD-10-CM

## 2018-10-25 DIAGNOSIS — F32.0 MILD MAJOR DEPRESSION (H): ICD-10-CM

## 2018-10-25 DIAGNOSIS — E78.1 HYPERTRIGLYCERIDEMIA: ICD-10-CM

## 2018-10-25 PROCEDURE — 36415 COLL VENOUS BLD VENIPUNCTURE: CPT | Performed by: INTERNAL MEDICINE

## 2018-10-25 PROCEDURE — 80053 COMPREHEN METABOLIC PANEL: CPT | Performed by: INTERNAL MEDICINE

## 2018-10-25 PROCEDURE — 93000 ELECTROCARDIOGRAM COMPLETE: CPT | Performed by: INTERNAL MEDICINE

## 2018-10-25 PROCEDURE — 99215 OFFICE O/P EST HI 40 MIN: CPT | Performed by: INTERNAL MEDICINE

## 2018-10-25 RX ORDER — BUPROPION HYDROCHLORIDE 150 MG/1
150 TABLET ORAL EVERY MORNING
Qty: 90 TABLET | Refills: 1 | Status: SHIPPED | OUTPATIENT
Start: 2018-10-25 | End: 2019-02-13

## 2018-10-25 RX ORDER — LISINOPRIL 10 MG/1
10 TABLET ORAL DAILY
Qty: 90 TABLET | Refills: 3 | Status: SHIPPED | OUTPATIENT
Start: 2018-10-25 | End: 2019-02-01

## 2018-10-25 NOTE — PATIENT INSTRUCTIONS
Before Your Surgery      Call your surgeon if there is any change in your health. This includes signs of a cold or flu (such as a sore throat, runny nose, cough, rash or fever).    Do not smoke, drink alcohol or take over the counter medicine (unless your surgeon or primary care doctor tells you to) for the 24 hours before and after surgery.    If you take prescribed drugs:   o Take nothing on the morning of surgery     Eating and drinking prior to surgery: follow the instructions from your surgeon    Take a shower or bath the night before surgery. Use the soap your surgeon gave you to gently clean your skin. If you do not have soap from your surgeon, use your regular soap. Do not shave or scrub the surgery site.  Wear clean pajamas and have clean sheets on your bed.     Labs today: electrolytes and kidney function    EKG looks normal    Put refills of wellbutrin and lisinopril on file    After healed from surgery, recommend trying to switch cholesterol medication with family history of heart disease

## 2018-10-25 NOTE — PROGRESS NOTES
[facility]  [address]  [address]  [place] [address]  [phone]  Dept: [phone]    PRE-OP EVALUATION:  Today's date: 10/25/2018    Denny Herrera (: 1980) presents for pre-operative evaluation assessment as requested by Dr. Waldo Licea.  He requires evaluation and anesthesia risk assessment prior to undergoing surgery/procedure for treatment of Herniated Disc .    Fax number for surgical facility:   Primary Physician: Gali Del Valle  Type of Anesthesia Anticipated: General    Patient has a Health Care Directive or Living Will:  NO    Preop Questions 10/25/2018   Who is doing your surgery? dr waldo licea   What are you having done? c5-6 herniated disc arthroplasty   Date of Surgery/Procedure: 10-29-18   Facility or Hospital where procedure/surgery will be performed: [facility]   1.  Do you have a history of Heart attack, stroke, stent, coronary bypass surgery, or other heart surgery? No   2.  Do you ever have any pain or discomfort in your chest? No   3.  Do you have a history of  Heart Failure? No   4.   Are you troubled by shortness of breath when:  walking on a level surface, or up a slight hill, or at night? No   5.  Do you currently have a cold, bronchitis or other respiratory infection? No   6.  Do you have a cough, shortness of breath, or wheezing? No   7.  Do you sometimes get pains in the calves of your legs when you walk? No   8. Do you or anyone in your family have previous history of blood clots? No   9.  Do you or does anyone in your family have a serious bleeding problem such as prolonged bleeding following surgeries or cuts? No   10. Have you ever had problems with anemia or been told to take iron pills? No   11. Have you had any abnormal blood loss such as black, tarry or bloody stools? No   12. Have you ever had a blood transfusion? No   13. Have you or any of your relatives ever had problems with anesthesia? No   14. Do you have  sleep apnea, excessive snoring or daytime drowsiness? No   15. Do you have any prosthetic heart valves? No   16. Do you have prosthetic joints? No     HPI:     HPI related to upcoming procedure: patient with neck pain for about 10 years. Flared about 2 months ago. Has tried injections. Plans for surgery.    HYPERLIPIDEMIA - Patient has a long history of significant Hyperlipidemia requiring medication for treatment with recent good control. Patient reports no problems or side effects with the medication.                                                                                                                                                       .  HYPERTENSION - Patient has longstanding history of HTN , currently denies any symptoms referable to elevated blood pressure. Specifically denies chest pain, palpitations, dyspnea, orthopnea, PND or peripheral edema. Blood pressure readings have been in normal range. Current medication regimen is as listed below. Patient denies any side effects of medication.                                                                                                                                                                                          .    MEDICAL HISTORY:     Patient Active Problem List    Diagnosis Date Noted     Hepatic steatosis 03/14/2014     Priority: High     Hypertriglyceridemia 02/08/2013     Priority: High     Mild major depression (H) 09/10/2018     Priority: Medium     Family history of premature coronary heart disease 05/08/2018     Priority: Medium     Obesity (H) 03/05/2018     Priority: Medium     Benign essential hypertension 03/05/2018     Priority: Medium     Acute pain of right knee 06/21/2016     Priority: Medium     Status post surgery 06/21/2016     Priority: Medium     Knee pain 06/15/2016     Priority: Medium     Liver lesion - needs f/u 2/2015 08/19/2014     Priority: Medium     Scl-70 antibody positive 06/13/2014     Priority: Medium      Seen by rheumatology. No evidence of sclerosis on exam.       Raynaud's syndrome 03/14/2014     Priority: Medium     CARDIOVASCULAR SCREENING; LDL GOAL LESS THAN 160 06/20/2011     Priority: Medium      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 2 for R knee, 3 L knee, ACL and meniscus arthroscopic procedures x 3, 2 open surgery     COLONOSCOPY N/A 8/19/2015    Procedure: COLONOSCOPY;  Surgeon: Timbo Greenberg MD;  Location:  GI     ORTHOPEDIC SURGERY       OSTEOTOMY TIBIA Right 6/15/2016    Procedure: OSTEOTOMY TIBIA;  Surgeon: Tim Valdez MD;  Location: [facility] OR     REMOVE HARDWARE KNEE Right 6/15/2016    Procedure: REMOVE HARDWARE KNEE;  Surgeon: Tim Valdez MD;  Location: [facility] OR     Current Outpatient Prescriptions   Medication Sig Dispense Refill     allopurinol (ZYLOPRIM) 300 MG tablet TAKE 1 TABLET BY MOUTH EVERY DAY AND 1/2 TABLET BY MOUTH EVERY EVENING 135 tablet 3     aspirin 81 MG tablet Take 81 mg by mouth daily       buPROPion (WELLBUTRIN XL) 150 MG 24 hr tablet Take 1 tablet (150 mg) by  "mouth every morning 90 tablet 1     cyclobenzaprine (FLEXERIL) 10 MG tablet Take 0.5-1 tablets (5-10 mg) by mouth 3 times daily as needed for muscle spasms 90 tablet 1     fenofibrate 160 MG tablet TAKE 1 TABLET(160 MG) BY MOUTH DAILY 90 tablet 3     indomethacin (INDOCIN) 50 MG capsule Take 1 capsule (50 mg) by mouth 3 times daily (with meals) 42 capsule 1     lisinopril (PRINIVIL/ZESTRIL) 10 MG tablet Take 1 tablet (10 mg) by mouth daily 90 tablet 3     multivitamin, therapeutic with minerals (MULTI-VITAMIN) TABS Take 1 tablet by mouth daily       Omega-3 Fatty Acids (OMEGA-3 FISH OIL PO) Take 1 capsule by mouth daily       polyethylene glycol (MIRALAX) powder Take 17 g (1 capful) by mouth daily 510 g 1     VITAMIN D, CHOLECALCIFEROL, PO Take by mouth daily       [DISCONTINUED] buPROPion (WELLBUTRIN XL) 150 MG 24 hr tablet Take 1 tablet (150 mg) by mouth every morning 30 tablet 2     [DISCONTINUED] lisinopril (PRINIVIL/ZESTRIL) 10 MG tablet Take 1 tablet (10 mg) by mouth daily 30 tablet 3     OTC products: None, except as noted above    Allergies   Allergen Reactions     No Known Drug Allergies       Latex Allergy: NO    Social History   Substance Use Topics     Smoking status: Never Smoker     Smokeless tobacco: Never Used      Comment: never smoked     Alcohol use 0.0 - 1.0 oz/week     0 - 2 Standard drinks or equivalent per week      Comment:  rarely (up to 4 beers a month)     History   Drug Use No       REVIEW OF SYSTEMS:   Constitutional, neuro, ENT, endocrine, pulmonary, cardiac, gastrointestinal, genitourinary, musculoskeletal, integument and psychiatric systems are negative, except as otherwise noted.    EXAM:   /84 (BP Location: Right arm, Patient Position: Sitting, Cuff Size: Adult Large)  Pulse 74  Temp 97.5  F (36.4  C) (Tympanic)  Ht 5' 10.5\" (1.791 m)  Wt 259 lb 8 oz (117.7 kg)  SpO2 98%  BMI 36.71 kg/m2    GENERAL APPEARANCE: healthy, alert and no distress     EYES: EOMI,  PERRL     " HENT: ear canals and TM's normal and nose and mouth without ulcers or lesions     NECK: no adenopathy, no asymmetry, masses, or scars and thyroid normal to palpation     RESP: lungs clear to auscultation - no rales, rhonchi or wheezes     CV: regular rates and rhythm, normal S1 S2, no S3 or S4 and no murmur, click or rub     ABDOMEN:  soft, nontender, no HSM or masses and bowel sounds normal     MS: extremities normal- no gross deformities noted, no evidence of inflammation in joints, FROM in all extremities.     SKIN: no suspicious lesions or rashes     NEURO: Normal strength and tone, sensory exam grossly normal, mentation intact and speech normal     PSYCH: mentation appears normal. and affect normal/bright     LYMPHATICS: No cervical adenopathy    DIAGNOSTICS:   EKG: appears normal, NSR, normal axis, normal intervals, no acute ST/T changes c/w ischemia, no LVH by voltage criteria, unchanged from previous tracings      Recent Labs   Lab Test  11/03/17   0848  01/17/17   0714  06/17/16   0646   07/13/15   1949   HGB   --   14.0  11.8*   < >  14.9   PLT   --   236   --    --   211   NA  140  141   --    < >   --    POTASSIUM  3.9  4.1   --    < >   --    CR  1.02  1.08   --    < >   --     < > = values in this interval not displayed.      IMPRESSION:   Reason for surgery/procedure: cervical radiculopathy/anterior cervical decompression and disc replacement  Diagnosis/reason for consult: sheela-operative management    The proposed surgical procedure is considered INTERMEDIATE risk.    REVISED CARDIAC RISK INDEX  The patient has the following serious cardiovascular risks for perioperative complications such as (MI, PE, VFib and 3  AV Block):  No serious cardiac risks  INTERPRETATION: 0 risks: Class I (very low risk - 0.4% complication rate)    The patient has the following additional risks for perioperative complications:  No identified additional risks      ICD-10-CM    1. Preop general physical exam Z01.818 EKG  12-lead complete w/read - Clinics     Comprehensive metabolic panel   2. Cervical radiculopathy M54.12    3. Benign essential hypertension I10 lisinopril (PRINIVIL/ZESTRIL) 10 MG tablet   4. Attention deficit hyperactivity disorder (ADHD), predominantly inattentive type F90.0 buPROPion (WELLBUTRIN XL) 150 MG 24 hr tablet   5. Mild major depression (H) F32.0 buPROPion (WELLBUTRIN XL) 150 MG 24 hr tablet   6. Hypertriglyceridemia E78.1  controlled on fenofibrate   7. Family history of ischemic heart disease Z82.49  given family history of MI in brother at age 40, stress test was done and negative. Discussed trial of switching fenofibrate to statin after heals from surgery given family history.       RECOMMENDATIONS:     --Patient is to take all scheduled medications on the day of surgery EXCEPT for modifications listed below.    Anticoagulant or Antiplatelet Medication Use  ASPIRIN: Discontinue ASA 7-10 days prior to procedure to reduce bleeding risk.  It should be resumed post-operatively.      ACE Inhibitor or Angiotensin Receptor Blocker (ARB) Use  Ace inhibitor or Angiotensin Receptor Blocker (ARB) and should HOLD this medication for the 24 hours prior to surgery.    APPROVAL GIVEN to proceed with proposed procedure, without further diagnostic evaluation       Signed Electronically by: Gali Del Valle MD    Copy of this evaluation report is provided to requesting physician.    [facility] Preop Guidelines    Revised Cardiac Risk Index    ADDENDUM 10/26/2018:  --------------------------------  LFTs came back very elevated. Unclear etiology at this point. Concerned could be due to bupropion as this is a new medication for him and it is a rare cause of hepatitis. DDx includes viral etiologies vs gallbladder vs autoimmune vs hemachromatosis etc. Viral studies pending. Bili not elevated making gallbladder less likely. Will check US next week. He will stop the small amount of acetaminophen he is taking as well as  the bupropion. We will unfortunately need to cancel his surgery as I'm concerned about the safety of using anesthetics with his current LFTs.    Gali Del Valle MD  Internal Medicine/Pediatrics      ADDENDUM 11/07/2018:  ---------------------------------  LFTs decreasing off of the bupropion. Now in safe range for surgery. Ok to proceed with surgery.    Gali Del Valle MD  Internal Medicine/Pediatrics

## 2018-10-25 NOTE — MR AVS SNAPSHOT
After Visit Summary   10/25/2018    Denny Herrera    MRN: [id number]           Patient Information     Date Of Birth          1980        Visit Information        Provider Department      10/25/2018 10:00 AM Gali Del Valle MD [facility]        Today's Diagnoses     Preop general physical exam    -  1    Cervical radiculopathy        Attention deficit hyperactivity disorder (ADHD), predominantly inattentive type        Mild major depression (H)        Benign essential hypertension          Care Instructions      Before Your Surgery      Call your surgeon if there is any change in your health. This includes signs of a cold or flu (such as a sore throat, runny nose, cough, rash or fever).    Do not smoke, drink alcohol or take over the counter medicine (unless your surgeon or primary care doctor tells you to) for the 24 hours before and after surgery.    If you take prescribed drugs:   o Take nothing on the morning of surgery     Eating and drinking prior to surgery: follow the instructions from your surgeon    Take a shower or bath the night before surgery. Use the soap your surgeon gave you to gently clean your skin. If you do not have soap from your surgeon, use your regular soap. Do not shave or scrub the surgery site.  Wear clean pajamas and have clean sheets on your bed.     Labs today: electrolytes and kidney function    EKG looks normal    Put refills of wellbutrin and lisinopril on file    After healed from surgery, recommend trying to switch cholesterol medication with family history of heart disease          Follow-ups after your visit        Follow-up notes from your care team     Return in about 3 months (around 1/25/2019).      Your next 10 appointments already scheduled     Oct 29, 2018   Procedure with Waldo Emery MD   [facility] (--)    [address]  [facility] [id number] [phone]              Who to contact      "If you have questions or need follow up information about today's clinic visit or your schedule please contact [facility] directly at [phone].  Normal or non-critical lab and imaging results will be communicated to you by MyChart, letter or phone within 4 business days after the clinic has received the results. If you do not hear from us within 7 days, please contact the clinic through [employer] or phone. If you have a critical or abnormal lab result, we will notify you by phone as soon as possible.  Submit refill requests through [employer] or call your pharmacy and they will forward the refill request to us. Please allow 3 business days for your refill to be completed.          Additional Information About Your Visit        [employer] Information     [employer] gives you secure access to your electronic health record. If you see a primary care provider, you can also send messages to your care team and make appointments. If you have questions, please call your primary care clinic.  If you do not have a primary care provider, please call [phone] and they will assist you.        Care EveryWhere ID     This is your Care EveryWhere ID. This could be used by other organizations to access your [place] medical records  [id number]        Your Vitals Were     Pulse Temperature Height Pulse Oximetry BMI (Body Mass Index)       74 97.5  F (36.4  C) (Tympanic) 5' 10.5\" (1.791 m) 98% 36.71 kg/m2        Blood Pressure from Last 3 Encounters:   10/25/18 132/84   10/08/18 (!) 152/100   10/01/18 (!) 161/92    Weight from Last 3 Encounters:   10/25/18 259 lb 8 oz (117.7 kg)   09/10/18 270 lb 14.4 oz (122.9 kg)   07/23/18 266 lb (120.7 kg)              We Performed the Following     Comprehensive metabolic panel     EKG 12-lead complete w/read - Clinics          Where to get your medicines      These medications were sent to [facility] [address] [place] [address] [id number] [facility] [address] "  [address] [facility] [address]     Phone:  [phone]     buPROPion 150 MG 24 hr tablet    lisinopril 10 MG tablet          Primary Care Provider Office Phone # Fax #    Gali Del Valle [phone] [address] [place] [address]        Equal Access to Services     [facility]: Hadii aad ku hadasho Soomaali, waaxda luqadaha, qaybta kaalmada adeegyada, waxay idiin hayaan adeeg khlarrymiguel ángel laKrishaan . So [facility] [phone].    ATENCIÓN: Si habla español, tiene a tapia disposición servicios gratuitos de asistencia lingüística. Llame al [phone].    We comply with applicable federal civil rights laws and [place] laws. We do not discriminate on the basis of race, color, national origin, age, disability, sex, sexual orientation, or gender identity.            Thank you!     [facility]. Our goal is always to provide you with excellent care. Hearing back from our patients is one way we can continue to improve our services. Please take a few minutes to complete the written survey that you may receive in the mail after your visit with us. Thank you!             Your Updated Medication List - Protect others around you: Learn how to safely use, store and throw away your medicines at www.disposemymeds.org.          This list is accurate as of 10/25/18 10:42 AM.  Always use your most recent med list.                   Brand Name Dispense Instructions for use Diagnosis    allopurinol 300 MG tablet    ZYLOPRIM    135 tablet    TAKE 1 TABLET BY MOUTH EVERY DAY AND 1/2 TABLET BY MOUTH EVERY EVENING    Podagra       aspirin 81 MG tablet      Take 81 mg by mouth daily        buPROPion 150 MG 24 hr tablet    WELLBUTRIN XL    90 tablet    Take 1 tablet (150 mg) by mouth every morning    Attention deficit hyperactivity disorder (ADHD), predominantly inattentive type, Mild major depression (H)       cyclobenzaprine 10 MG tablet     FLEXERIL    90 tablet    Take 0.5-1 tablets (5-10 mg) by mouth 3 times daily as needed for muscle spasms    Neck muscle spasm       fenofibrate 160 MG tablet     90 tablet    TAKE 1 TABLET(160 MG) BY MOUTH DAILY    Hypertriglyceridemia       indomethacin 50 MG capsule    INDOCIN    42 capsule    Take 1 capsule (50 mg) by mouth 3 times daily (with meals)    Acute gout of left foot, unspecified cause       lisinopril 10 MG tablet    PRINIVIL/ZESTRIL    90 tablet    Take 1 tablet (10 mg) by mouth daily    Benign essential hypertension       MIRALAX powder   Generic drug:  polyethylene glycol     510 g    Take 17 g (1 capful) by mouth daily    Status post osteotomy       Multi-vitamin Tabs tablet      Take 1 tablet by mouth daily        OMEGA-3 FISH OIL PO      Take 1 capsule by mouth daily        VITAMIN D (CHOLECALCIFEROL) PO      Take by mouth daily

## 2018-10-26 ENCOUNTER — TELEPHONE (OUTPATIENT)
Dept: PEDIATRICS | Facility: CLINIC | Age: 38
End: 2018-10-26

## 2018-10-26 DIAGNOSIS — K75.9 HEPATITIS: ICD-10-CM

## 2018-10-26 DIAGNOSIS — K75.9 HEPATITIS: Primary | ICD-10-CM

## 2018-10-26 LAB
ALBUMIN SERPL-MCNC: 4.4 G/DL (ref 3.4–5)
ALBUMIN SERPL-MCNC: 4.6 G/DL (ref 3.4–5)
ALP SERPL-CCNC: 25 U/L (ref 40–150)
ALP SERPL-CCNC: 31 U/L (ref 40–150)
ALT SERPL W P-5'-P-CCNC: 517 U/L (ref 0–70)
ALT SERPL W P-5'-P-CCNC: 544 U/L (ref 0–70)
ANION GAP SERPL CALCULATED.3IONS-SCNC: 10 MMOL/L (ref 3–14)
ANION GAP SERPL CALCULATED.3IONS-SCNC: 10 MMOL/L (ref 3–14)
AST SERPL W P-5'-P-CCNC: 290 U/L (ref 0–45)
AST SERPL W P-5'-P-CCNC: 308 U/L (ref 0–45)
BILIRUB SERPL-MCNC: 0.8 MG/DL (ref 0.2–1.3)
BILIRUB SERPL-MCNC: 0.9 MG/DL (ref 0.2–1.3)
BUN SERPL-MCNC: 13 MG/DL (ref 7–30)
BUN SERPL-MCNC: 16 MG/DL (ref 7–30)
CALCIUM SERPL-MCNC: 10.3 MG/DL (ref 8.5–10.1)
CALCIUM SERPL-MCNC: 9.8 MG/DL (ref 8.5–10.1)
CHLORIDE SERPL-SCNC: 102 MMOL/L (ref 94–109)
CHLORIDE SERPL-SCNC: 105 MMOL/L (ref 94–109)
CO2 SERPL-SCNC: 25 MMOL/L (ref 20–32)
CO2 SERPL-SCNC: 29 MMOL/L (ref 20–32)
CREAT SERPL-MCNC: 0.9 MG/DL (ref 0.66–1.25)
CREAT SERPL-MCNC: 1.12 MG/DL (ref 0.66–1.25)
GFR SERPL CREATININE-BSD FRML MDRD: 73 ML/MIN/1.7M2
GFR SERPL CREATININE-BSD FRML MDRD: >90 ML/MIN/1.7M2
GLUCOSE SERPL-MCNC: 120 MG/DL (ref 70–99)
GLUCOSE SERPL-MCNC: 90 MG/DL (ref 70–99)
POTASSIUM SERPL-SCNC: 3.8 MMOL/L (ref 3.4–5.3)
POTASSIUM SERPL-SCNC: 4.3 MMOL/L (ref 3.4–5.3)
PROT SERPL-MCNC: 8.4 G/DL (ref 6.8–8.8)
PROT SERPL-MCNC: 8.8 G/DL (ref 6.8–8.8)
SODIUM SERPL-SCNC: 137 MMOL/L (ref 133–144)
SODIUM SERPL-SCNC: 144 MMOL/L (ref 133–144)

## 2018-10-26 PROCEDURE — 36415 COLL VENOUS BLD VENIPUNCTURE: CPT | Performed by: INTERNAL MEDICINE

## 2018-10-26 PROCEDURE — 87340 HEPATITIS B SURFACE AG IA: CPT | Performed by: INTERNAL MEDICINE

## 2018-10-26 PROCEDURE — 86709 HEPATITIS A IGM ANTIBODY: CPT | Performed by: INTERNAL MEDICINE

## 2018-10-26 PROCEDURE — 80053 COMPREHEN METABOLIC PANEL: CPT | Performed by: INTERNAL MEDICINE

## 2018-10-26 PROCEDURE — 86704 HEP B CORE ANTIBODY TOTAL: CPT | Performed by: INTERNAL MEDICINE

## 2018-10-26 PROCEDURE — 83540 ASSAY OF IRON: CPT | Performed by: INTERNAL MEDICINE

## 2018-10-26 PROCEDURE — 86803 HEPATITIS C AB TEST: CPT | Performed by: INTERNAL MEDICINE

## 2018-10-26 PROCEDURE — 86708 HEPATITIS A ANTIBODY: CPT | Performed by: INTERNAL MEDICINE

## 2018-10-26 PROCEDURE — 83550 IRON BINDING TEST: CPT | Performed by: INTERNAL MEDICINE

## 2018-10-26 PROCEDURE — 86706 HEP B SURFACE ANTIBODY: CPT | Performed by: INTERNAL MEDICINE

## 2018-10-26 NOTE — H&P (VIEW-ONLY)
[facility]  [address]  [address]  [facility] [address]  [phone]  Dept: [phone]    PRE-OP EVALUATION:  Today's date: 10/25/2018    Denny Herrera (: 1980) presents for pre-operative evaluation assessment as requested by Dr. Waldo Licea.  He requires evaluation and anesthesia risk assessment prior to undergoing surgery/procedure for treatment of Herniated Disc .    Fax number for surgical facility:   Primary Physician: Gali Del Valle  Type of Anesthesia Anticipated: General    Patient has a Health Care Directive or Living Will:  NO    Preop Questions 10/25/2018   Who is doing your surgery? dr waldo licea   What are you having done? c5-6 herniated disc arthroplasty   Date of Surgery/Procedure: 10-29-18   Facility or Hospital where procedure/surgery will be performed: [facility]   1.  Do you have a history of Heart attack, stroke, stent, coronary bypass surgery, or other heart surgery? No   2.  Do you ever have any pain or discomfort in your chest? No   3.  Do you have a history of  Heart Failure? No   4.   Are you troubled by shortness of breath when:  walking on a level surface, or up a slight hill, or at night? No   5.  Do you currently have a cold, bronchitis or other respiratory infection? No   6.  Do you have a cough, shortness of breath, or wheezing? No   7.  Do you sometimes get pains in the calves of your legs when you walk? No   8. Do you or anyone in your family have previous history of blood clots? No   9.  Do you or does anyone in your family have a serious bleeding problem such as prolonged bleeding following surgeries or cuts? No   10. Have you ever had problems with anemia or been told to take iron pills? No   11. Have you had any abnormal blood loss such as black, tarry or bloody stools? No   12. Have you ever had a blood transfusion? No   13. Have you or any of your relatives ever had problems with anesthesia? No   14. Do you have  sleep apnea, excessive snoring or daytime drowsiness? No   15. Do you have any prosthetic heart valves? No   16. Do you have prosthetic joints? No     HPI:     HPI related to upcoming procedure: patient with neck pain for about 10 years. Flared about 2 months ago. Has tried injections. Plans for surgery.    HYPERLIPIDEMIA - Patient has a long history of significant Hyperlipidemia requiring medication for treatment with recent good control. Patient reports no problems or side effects with the medication.                                                                                                                                                       .  HYPERTENSION - Patient has longstanding history of HTN , currently denies any symptoms referable to elevated blood pressure. Specifically denies chest pain, palpitations, dyspnea, orthopnea, PND or peripheral edema. Blood pressure readings have been in normal range. Current medication regimen is as listed below. Patient denies any side effects of medication.                                                                                                                                                                                          .    MEDICAL HISTORY:     Patient Active Problem List    Diagnosis Date Noted     Hepatic steatosis 03/14/2014     Priority: High     Hypertriglyceridemia 02/08/2013     Priority: High     Mild major depression (H) 09/10/2018     Priority: Medium     Family history of premature coronary heart disease 05/08/2018     Priority: Medium     Obesity (H) 03/05/2018     Priority: Medium     Benign essential hypertension 03/05/2018     Priority: Medium     Acute pain of right knee 06/21/2016     Priority: Medium     Status post surgery 06/21/2016     Priority: Medium     Knee pain 06/15/2016     Priority: Medium     Liver lesion - needs f/u 2/2015 08/19/2014     Priority: Medium     Scl-70 antibody positive 06/13/2014     Priority: Medium      Seen by rheumatology. No evidence of sclerosis on exam.       Raynaud's syndrome 03/14/2014     Priority: Medium     CARDIOVASCULAR SCREENING; LDL GOAL LESS THAN 160 06/20/2011     Priority: Medium      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 2 for R knee, 3 L knee, ACL and meniscus arthroscopic procedures x 3, 2 open surgery     COLONOSCOPY N/A 8/19/2015    Procedure: COLONOSCOPY;  Surgeon: Timbo Greenberg MD;  Location:  GI     ORTHOPEDIC SURGERY       OSTEOTOMY TIBIA Right 6/15/2016    Procedure: OSTEOTOMY TIBIA;  Surgeon: Tim Valdez MD;  Location: [facility] OR     REMOVE HARDWARE KNEE Right 6/15/2016    Procedure: REMOVE HARDWARE KNEE;  Surgeon: Tim Valdez MD;  Location: [facility] OR     Current Outpatient Prescriptions   Medication Sig Dispense Refill     allopurinol (ZYLOPRIM) 300 MG tablet TAKE 1 TABLET BY MOUTH EVERY DAY AND 1/2 TABLET BY MOUTH EVERY EVENING 135 tablet 3     aspirin 81 MG tablet Take 81 mg by mouth daily       buPROPion (WELLBUTRIN XL) 150 MG 24 hr tablet Take 1 tablet (150 mg) by  "mouth every morning 90 tablet 1     cyclobenzaprine (FLEXERIL) 10 MG tablet Take 0.5-1 tablets (5-10 mg) by mouth 3 times daily as needed for muscle spasms 90 tablet 1     fenofibrate 160 MG tablet TAKE 1 TABLET(160 MG) BY MOUTH DAILY 90 tablet 3     indomethacin (INDOCIN) 50 MG capsule Take 1 capsule (50 mg) by mouth 3 times daily (with meals) 42 capsule 1     lisinopril (PRINIVIL/ZESTRIL) 10 MG tablet Take 1 tablet (10 mg) by mouth daily 90 tablet 3     multivitamin, therapeutic with minerals (MULTI-VITAMIN) TABS Take 1 tablet by mouth daily       Omega-3 Fatty Acids (OMEGA-3 FISH OIL PO) Take 1 capsule by mouth daily       polyethylene glycol (MIRALAX) powder Take 17 g (1 capful) by mouth daily 510 g 1     VITAMIN D, CHOLECALCIFEROL, PO Take by mouth daily       [DISCONTINUED] buPROPion (WELLBUTRIN XL) 150 MG 24 hr tablet Take 1 tablet (150 mg) by mouth every morning 30 tablet 2     [DISCONTINUED] lisinopril (PRINIVIL/ZESTRIL) 10 MG tablet Take 1 tablet (10 mg) by mouth daily 30 tablet 3     OTC products: None, except as noted above    Allergies   Allergen Reactions     No Known Drug Allergies       Latex Allergy: NO    Social History   Substance Use Topics     Smoking status: Never Smoker     Smokeless tobacco: Never Used      Comment: never smoked     Alcohol use 0.0 - 1.0 oz/week     0 - 2 Standard drinks or equivalent per week      Comment:  rarely (up to 4 beers a month)     History   Drug Use No       REVIEW OF SYSTEMS:   Constitutional, neuro, ENT, endocrine, pulmonary, cardiac, gastrointestinal, genitourinary, musculoskeletal, integument and psychiatric systems are negative, except as otherwise noted.    EXAM:   /84 (BP Location: Right arm, Patient Position: Sitting, Cuff Size: Adult Large)  Pulse 74  Temp 97.5  F (36.4  C) (Tympanic)  Ht 5' 10.5\" (1.791 m)  Wt 259 lb 8 oz (117.7 kg)  SpO2 98%  BMI 36.71 kg/m2    GENERAL APPEARANCE: healthy, alert and no distress     EYES: EOMI,  PERRL     " HENT: ear canals and TM's normal and nose and mouth without ulcers or lesions     NECK: no adenopathy, no asymmetry, masses, or scars and thyroid normal to palpation     RESP: lungs clear to auscultation - no rales, rhonchi or wheezes     CV: regular rates and rhythm, normal S1 S2, no S3 or S4 and no murmur, click or rub     ABDOMEN:  soft, nontender, no HSM or masses and bowel sounds normal     MS: extremities normal- no gross deformities noted, no evidence of inflammation in joints, FROM in all extremities.     SKIN: no suspicious lesions or rashes     NEURO: Normal strength and tone, sensory exam grossly normal, mentation intact and speech normal     PSYCH: mentation appears normal. and affect normal/bright     LYMPHATICS: No cervical adenopathy    DIAGNOSTICS:   EKG: appears normal, NSR, normal axis, normal intervals, no acute ST/T changes c/w ischemia, no LVH by voltage criteria, unchanged from previous tracings      Recent Labs   Lab Test  11/03/17   0848  01/17/17   0714  06/17/16   0646   07/13/15   1949   HGB   --   14.0  11.8*   < >  14.9   PLT   --   236   --    --   211   NA  140  141   --    < >   --    POTASSIUM  3.9  4.1   --    < >   --    CR  1.02  1.08   --    < >   --     < > = values in this interval not displayed.      IMPRESSION:   Reason for surgery/procedure: cervical radiculopathy/anterior cervical decompression and disc replacement  Diagnosis/reason for consult: sheela-operative management    The proposed surgical procedure is considered INTERMEDIATE risk.    REVISED CARDIAC RISK INDEX  The patient has the following serious cardiovascular risks for perioperative complications such as (MI, PE, VFib and 3  AV Block):  No serious cardiac risks  INTERPRETATION: 0 risks: Class I (very low risk - 0.4% complication rate)    The patient has the following additional risks for perioperative complications:  No identified additional risks      ICD-10-CM    1. Preop general physical exam Z01.818 EKG  12-lead complete w/read - Clinics     Comprehensive metabolic panel   2. Cervical radiculopathy M54.12    3. Benign essential hypertension I10 lisinopril (PRINIVIL/ZESTRIL) 10 MG tablet   4. Attention deficit hyperactivity disorder (ADHD), predominantly inattentive type F90.0 buPROPion (WELLBUTRIN XL) 150 MG 24 hr tablet   5. Mild major depression (H) F32.0 buPROPion (WELLBUTRIN XL) 150 MG 24 hr tablet   6. Hypertriglyceridemia E78.1  controlled on fenofibrate   7. Family history of ischemic heart disease Z82.49  given family history of MI in brother at age 40, stress test was done and negative. Discussed trial of switching fenofibrate to statin after heals from surgery given family history.       RECOMMENDATIONS:     --Patient is to take all scheduled medications on the day of surgery EXCEPT for modifications listed below.    Anticoagulant or Antiplatelet Medication Use  ASPIRIN: Discontinue ASA 7-10 days prior to procedure to reduce bleeding risk.  It should be resumed post-operatively.      ACE Inhibitor or Angiotensin Receptor Blocker (ARB) Use  Ace inhibitor or Angiotensin Receptor Blocker (ARB) and should HOLD this medication for the 24 hours prior to surgery.    APPROVAL GIVEN to proceed with proposed procedure, without further diagnostic evaluation       Signed Electronically by: Gali Del Valle MD    Copy of this evaluation report is provided to requesting physician.    [facility] Preop Guidelines    Revised Cardiac Risk Index

## 2018-10-26 NOTE — TELEPHONE ENCOUNTER
LM for patient to call back. Need to discuss pre-op labs.    Gali Del Valle MD  Internal Medicine/Pediatrics

## 2018-10-26 NOTE — TELEPHONE ENCOUNTER
Discussed with patient. ,     Has been taking about 2 pills of acetaminophen a day. No ETOH. Only other new medication is the bupropion he started on 9/10, which looks like potentially could cause liver injury. He is also taking allopurinol and lisinopril, but has been on these for awhile.     Previous values  and thought due to fatty US that has been seen on previous US.     Will plan to recheck stat today. IF still elevated, will need to cancel surgery for Monday and would have him stop acetaminophen and bupropion and get liver US with recheck of labs next week.    Will route to Dr. Escamilla as FYI in case she is called with critical value, but plan to check chart and notify patient when I get home.    Gali Del Valle MD  Internal Medicine/Pediatrics

## 2018-10-27 LAB
IRON SATN MFR SERPL: 29 % (ref 15–46)
IRON SERPL-MCNC: 105 UG/DL (ref 35–180)
TIBC SERPL-MCNC: 362 UG/DL (ref 240–430)

## 2018-10-28 NOTE — TELEPHONE ENCOUNTER
Late entry:    Received call from lab about critical results .  I called and discussed result with patient.  He will cancel surgery for Monday.  He will avoid all alcohol and acetaminophen. He is aware that viral hepatitis labs are pending as well.  Dr. Del Valle will call him next week with further plans for follow up/diagnosis. Pt expressed understanding.    Yelitza Escamilla MD

## 2018-10-29 LAB
HAV IGG SER QL IA: REACTIVE
HAV IGM SERPL QL IA: NONREACTIVE
HBV CORE AB SERPL QL IA: NONREACTIVE
HBV SURFACE AB SERPL IA-ACNC: >1000 M[IU]/ML
HBV SURFACE AG SERPL QL IA: NONREACTIVE
HCV AB SERPL QL IA: NONREACTIVE

## 2018-10-30 DIAGNOSIS — K75.9 HEPATITIS: ICD-10-CM

## 2018-10-30 LAB
ALBUMIN SERPL-MCNC: 4.2 G/DL (ref 3.4–5)
ALP SERPL-CCNC: 26 U/L (ref 40–150)
ALT SERPL W P-5'-P-CCNC: 386 U/L (ref 0–70)
AST SERPL W P-5'-P-CCNC: 110 U/L (ref 0–45)
BILIRUB DIRECT SERPL-MCNC: 0.2 MG/DL (ref 0–0.2)
BILIRUB SERPL-MCNC: 0.5 MG/DL (ref 0.2–1.3)
PROT SERPL-MCNC: 8.4 G/DL (ref 6.8–8.8)

## 2018-10-30 PROCEDURE — 36415 COLL VENOUS BLD VENIPUNCTURE: CPT | Performed by: INTERNAL MEDICINE

## 2018-10-30 PROCEDURE — 80076 HEPATIC FUNCTION PANEL: CPT | Performed by: INTERNAL MEDICINE

## 2018-11-01 ENCOUNTER — HOSPITAL ENCOUNTER (OUTPATIENT)
Dept: ULTRASOUND IMAGING | Facility: CLINIC | Age: 38
Discharge: HOME | End: 2018-11-01
Attending: INTERNAL MEDICINE | Admitting: INTERNAL MEDICINE
Payer: COMMERCIAL

## 2018-11-01 DIAGNOSIS — K75.9 HEPATITIS: ICD-10-CM

## 2018-11-01 PROCEDURE — 76705 ECHO EXAM OF ABDOMEN: CPT

## 2018-11-02 DIAGNOSIS — K75.9 HEPATITIS: ICD-10-CM

## 2018-11-02 PROCEDURE — 36415 COLL VENOUS BLD VENIPUNCTURE: CPT | Performed by: INTERNAL MEDICINE

## 2018-11-02 PROCEDURE — 80076 HEPATIC FUNCTION PANEL: CPT | Performed by: INTERNAL MEDICINE

## 2018-11-03 LAB
ALBUMIN SERPL-MCNC: 4.2 G/DL (ref 3.4–5)
ALP SERPL-CCNC: 28 U/L (ref 40–150)
ALT SERPL W P-5'-P-CCNC: 233 U/L (ref 0–70)
AST SERPL W P-5'-P-CCNC: 70 U/L (ref 0–45)
BILIRUB DIRECT SERPL-MCNC: 0.1 MG/DL (ref 0–0.2)
BILIRUB SERPL-MCNC: 0.4 MG/DL (ref 0.2–1.3)
PROT SERPL-MCNC: 8 G/DL (ref 6.8–8.8)

## 2018-11-05 DIAGNOSIS — K75.9 HEPATITIS: ICD-10-CM

## 2018-11-05 PROCEDURE — 80076 HEPATIC FUNCTION PANEL: CPT | Performed by: INTERNAL MEDICINE

## 2018-11-05 PROCEDURE — 36415 COLL VENOUS BLD VENIPUNCTURE: CPT | Performed by: INTERNAL MEDICINE

## 2018-11-06 LAB
ALBUMIN SERPL-MCNC: 4.4 G/DL (ref 3.4–5)
ALP SERPL-CCNC: 30 U/L (ref 40–150)
ALT SERPL W P-5'-P-CCNC: 166 U/L (ref 0–70)
AST SERPL W P-5'-P-CCNC: 62 U/L (ref 0–45)
BILIRUB DIRECT SERPL-MCNC: 0.1 MG/DL (ref 0–0.2)
BILIRUB SERPL-MCNC: 0.5 MG/DL (ref 0.2–1.3)
PROT SERPL-MCNC: 8.7 G/DL (ref 6.8–8.8)

## 2018-11-07 ENCOUNTER — TELEPHONE (OUTPATIENT)
Dept: PEDIATRICS | Facility: CLINIC | Age: 38
End: 2018-11-07

## 2018-11-07 NOTE — TELEPHONE ENCOUNTER
Surgery center is calling.  They need pre-op addended.  Per lab result note of 11/05/18-Your labs continue to decrease and are in safe range for surgery. I would still hold off on tylenol when possible. Taking once a day is probably ok at this point if you are having really bad pain. Your ALT is down to 166 from 233 and your AST is down to 62 from 70.    Please fax to [facility] at [phone]  Phone #-[phone]  Patient is arriving at 10 am today.  ABDON Low RN

## 2018-11-15 ENCOUNTER — TRANSFERRED RECORDS (OUTPATIENT)
Dept: HEALTH INFORMATION MANAGEMENT | Facility: CLINIC | Age: 38
End: 2018-11-15

## 2018-12-05 ENCOUNTER — FCC EXTENDED DOCUMENTATION (OUTPATIENT)
Dept: PSYCHOLOGY | Facility: CLINIC | Age: 38
End: 2018-12-05

## 2018-12-18 ENCOUNTER — THERAPY VISIT (OUTPATIENT)
Dept: PHYSICAL THERAPY | Facility: CLINIC | Age: 38
End: 2018-12-18
Payer: COMMERCIAL

## 2018-12-18 DIAGNOSIS — Z98.890 STATUS POST SURGERY: ICD-10-CM

## 2018-12-18 DIAGNOSIS — M54.2 NECK PAIN: ICD-10-CM

## 2018-12-18 PROCEDURE — 97110 THERAPEUTIC EXERCISES: CPT | Mod: GP | Performed by: PHYSICAL THERAPIST

## 2018-12-18 PROCEDURE — 97161 PT EVAL LOW COMPLEX 20 MIN: CPT | Mod: GP | Performed by: PHYSICAL THERAPIST

## 2018-12-18 NOTE — PROGRESS NOTES
"[facility] Initial Evaluation  Subjective:  The history is provided by the patient. No  was used.   Denny Herrera is a 38 year old male with a cervical spine condition.  Condition occurred with:  Insidious onset.  Condition occurred: for unknown reasons.  This is a new condition     Patient reports pain:  Central cervical spine.  Radiates to:  None.  Pain is described as aching and is intermittent and reported as 5/10.  Associated symptoms:  Loss of motion/stiffness and loss of strength. Pain is worse during the day.  Symptoms are exacerbated by change of position, looking up or down, rotating head, lying down and sitting and relieved by rest.  Since onset symptoms are gradually improving.  Special tests:  MRI.  Previous treatment includes surgery.  There was moderate improvement following previous treatment.  General health as reported by patient is good.  Pertinent medical history includes:  Depression, high blood pressure and overweight.  Medical allergies: no.  Other surgeries include:  Orthopedic surgery (knee and neck).  Current medications:  Anti-depressants, anti-inflammatory, high blood pressure medication and muscle relaxants.  Current occupation is IT   .  Patient is currently not working due to present treatment problem.  Primary job tasks include:  Prolonged sitting and lifting.    Barriers include:  None as reported by the patient.                            Objective:  Standing Alignment:    Cervical/Thoracic:  Forward head  Shoulder/UE:  Rounded shoulders                                  Cervical/Thoracic Evaluation    AROM:  AROM Cervical:    Flexion:            75% wiht ER \"tightness\"  Extension:       75% with ER \"tightness\"  Rotation:         Left: 75% with ER \"tightness\"     Right: 75% with ER \"tightness\"  Side Bend:      Left: 75% with ER \"tightness\"     Right:  75% with ER \"tightness\"      Headaches: none  Cervical Myotomes:  " normal                        Cervical Palpation:    Tenderness present at Left:    Upper Trap; Levator and Erector Spinae  Tenderness not present at Left:   Sternocleidomstoid; Scalenes or Suboccipitals  Tenderness present at Right:    Sternocleidomstoid; Scalenes; Upper Trap; Levator and Erector Spinae  Tenderness not present at Right:      Suboccipitals                                                  General     ROS    Assessment/Plan:    Patient is a 38 year old male with cervical complaints.    Patient has the following significant findings with corresponding treatment plan.                Diagnosis 1:  S/p C5-C6 disc replacement  Pain -  self management, education, directional preference exercise and home program  Decreased ROM/flexibility - manual therapy and therapeutic exercise  Decreased strength - therapeutic exercise and therapeutic activities  Impaired muscle performance - neuro re-education  Decreased function - therapeutic activities    Therapy Evaluation Codes:   1) History comprised of:   Personal factors that impact the plan of care:      None.    Comorbidity factors that impact the plan of care are:      Depression, High blood pressure and Overweight.     Medications impacting care: Anti-depressant, Anti-inflammatory, High blood pressure and Muscle relaxant.  2) Examination of Body Systems comprised of:   Body structures and functions that impact the plan of care:      Cervical spine.   Activity limitations that impact the plan of care are:      Driving, Lifting and Working.  3) Clinical presentation characteristics are:   Stable/Uncomplicated.  4) Decision-Making    Low complexity using standardized patient assessment instrument and/or measureable assessment of functional outcome.  Cumulative Therapy Evaluation is: Low complexity.    Previous and current functional limitations:  (See Goal Flow Sheet for this information)    Short term and Long term goals: (See Goal Flow Sheet for this  information)     Communication ability:  Patient appears to be able to clearly communicate and understand verbal and written communication and follow directions correctly.  Treatment Explanation - The following has been discussed with the patient:   RX ordered/plan of care  Anticipated outcomes  Possible risks and side effects  This patient would benefit from PT intervention to resume normal activities.   Rehab potential is good.    Frequency:  1 X week, once daily  Duration:  for 6 weeks  Discharge Plan:  Achieve all LTG.  Independent in home treatment program.  Reach maximal therapeutic benefit.    Please refer to the daily flowsheet for treatment today, total treatment time and time spent performing 1:1 timed codes.

## 2018-12-18 NOTE — LETTER
[facility] ATHLETIC [facility] PHYSICAL THERAPY  [address]  [facility] [address]  [phone]    2018    Re: Denny Herrera   :   1980  MRN:  [id number]   REFERRING PHYSICIAN:   Waldo Emery    [facility]  Date of Initial Evaluation:  18  Visits:  Rxs Used: 1  Reason for Referral:  Neck pain; Status post surgery    EVALUATION SUMMARY    [facility] Initial Evaluation  Subjective:  The history is provided by the patient. No  was used.   Denny Herrera is a 38 year old male with a cervical spine condition.  Condition occurred with:  Insidious onset.  Condition occurred: for unknown reasons.  This is a new condition     Patient reports pain:  Central cervical spine.  Radiates to:  None.  Pain is described as aching and is intermittent and reported as 5/10.  Associated symptoms:  Loss of motion/stiffness and loss of strength. Pain is worse during the day.  Symptoms are exacerbated by change of position, looking up or down, rotating head, lying down and sitting and relieved by rest.  Since onset symptoms are gradually improving.  Special tests:  MRI.  Previous treatment includes surgery.  There was moderate improvement following previous treatment.  General health as reported by patient is good.  Pertinent medical history includes:  Depression, high blood pressure and overweight.  Medical allergies: no.  Other surgeries include:  Orthopedic surgery (knee and neck).  Current medications:  Anti-depressants, anti-inflammatory, high blood pressure medication and muscle relaxants.  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  Primary job tasks include:  Prolonged sitting and lifting.  Barriers include:  None as reported by the patient.                Objective:  Standing Alignment:    Cervical/Thoracic:   "Forward head  Shoulder/UE:  Rounded shoulders      Cervical/Thoracic Evaluation  AROM:  AROM Cervical:  Flexion:            75% wiht ER \"tightness\"  Extension:       75% with ER \"tightness\"  Rotation:         Left: 75% with ER \"tightness\"     Right: 75% with ER \"tightness\"  Side Bend:      Left: 75% with ER \"tightness\"     Right:  75% with ER \"tightness\"  Headaches: none  Cervical Myotomes:  normal  Cervical Palpation:    Tenderness present at Left:    Upper Trap; Levator and Erector Spinae  Tenderness not present at Left:   Sternocleidomstoid; Scalenes or Suboccipitals  Tenderness present at Right:    Sternocleidomstoid; Scalenes; Upper Trap; Levator and Erector Spinae  Tenderness not present at Right:      Suboccipitals  Re: Denny Herrera   :   1980      Assessment/Plan:    Patient is a 38 year old male with cervical complaints.    Patient has the following significant findings with corresponding treatment plan.                Diagnosis 1:  S/p C5-C6 disc replacement  Pain -  self management, education, directional preference exercise and home program  Decreased ROM/flexibility - manual therapy and therapeutic exercise  Decreased strength - therapeutic exercise and therapeutic activities  Impaired muscle performance - neuro re-education  Decreased function - therapeutic activities    Therapy Evaluation Codes:   1) History comprised of:   Personal factors that impact the plan of care:      None.    Comorbidity factors that impact the plan of care are:      Depression, High blood pressure and Overweight.     Medications impacting care: Anti-depressant, Anti-inflammatory, High blood pressure and Muscle relaxant.  2) Examination of Body Systems comprised of:   Body structures and functions that impact the plan of care:      Cervical spine.   Activity limitations that impact the plan of care are:      Driving, Lifting and Working.  3) Clinical presentation characteristics " are:   Stable/Uncomplicated.  4) Decision-Making    Low complexity using standardized patient assessment instrument and/or measureable assessment of functional outcome.  Cumulative Therapy Evaluation is: Low complexity.    Previous and current functional limitations:  (See Goal Flow Sheet for this information)    Short term and Long term goals: (See Goal Flow Sheet for this information)   Communication ability:  Patient appears to be able to clearly communicate and understand verbal and written communication and follow directions correctly.  Treatment Explanation - The following has been discussed with the patient:   RX ordered/plan of care  This patient would benefit from PT intervention to resume normal activities.   Rehab potential is good.    Frequency:  1 X week, once daily  Duration:  for 6 weeks  Discharge Plan:  Achieve all LTG.  Independent in home treatment program.  Reach maximal therapeutic benefit.    Thank you for your referral.    INQUIRIES  Therapist:  Jose Durand, PT  [facility]  [address] [place] [id number]  Phone: [phone]  Fax: [phone]

## 2018-12-27 ENCOUNTER — THERAPY VISIT (OUTPATIENT)
Dept: PHYSICAL THERAPY | Facility: CLINIC | Age: 38
End: 2018-12-27
Payer: COMMERCIAL

## 2018-12-27 DIAGNOSIS — M54.2 NECK PAIN: ICD-10-CM

## 2018-12-27 DIAGNOSIS — Z98.890 STATUS POST SURGERY: ICD-10-CM

## 2018-12-27 PROCEDURE — 97112 NEUROMUSCULAR REEDUCATION: CPT | Mod: GP | Performed by: PHYSICAL THERAPIST

## 2018-12-27 PROCEDURE — 97110 THERAPEUTIC EXERCISES: CPT | Mod: GP | Performed by: PHYSICAL THERAPIST

## 2019-01-05 ENCOUNTER — ANCILLARY PROCEDURE (OUTPATIENT)
Dept: GENERAL RADIOLOGY | Facility: CLINIC | Age: 39
End: 2019-01-05
Payer: COMMERCIAL

## 2019-01-05 ENCOUNTER — OFFICE VISIT (OUTPATIENT)
Dept: URGENT CARE | Facility: URGENT CARE | Age: 39
End: 2019-01-05
Payer: COMMERCIAL

## 2019-01-05 VITALS
HEART RATE: 107 BPM | SYSTOLIC BLOOD PRESSURE: 144 MMHG | BODY MASS INDEX: 36.64 KG/M2 | TEMPERATURE: 98.9 F | DIASTOLIC BLOOD PRESSURE: 84 MMHG | OXYGEN SATURATION: 97 % | WEIGHT: 259 LBS

## 2019-01-05 DIAGNOSIS — R05.9 COUGH: Primary | ICD-10-CM

## 2019-01-05 DIAGNOSIS — R05.9 COUGH: ICD-10-CM

## 2019-01-05 DIAGNOSIS — R50.9 FEVER IN ADULT: ICD-10-CM

## 2019-01-05 DIAGNOSIS — M54.2 NECK PAIN: ICD-10-CM

## 2019-01-05 LAB
FLUAV+FLUBV AG SPEC QL: NEGATIVE
FLUAV+FLUBV AG SPEC QL: NEGATIVE
SPECIMEN SOURCE: NORMAL

## 2019-01-05 PROCEDURE — 72040 X-RAY EXAM NECK SPINE 2-3 VW: CPT

## 2019-01-05 PROCEDURE — 71046 X-RAY EXAM CHEST 2 VIEWS: CPT

## 2019-01-05 PROCEDURE — 87804 INFLUENZA ASSAY W/OPTIC: CPT | Performed by: PHYSICIAN ASSISTANT

## 2019-01-05 PROCEDURE — 99214 OFFICE O/P EST MOD 30 MIN: CPT | Performed by: PHYSICIAN ASSISTANT

## 2019-01-05 RX ORDER — CODEINE PHOSPHATE/GUAIFENESIN 10-100MG/5
5 LIQUID (ML) ORAL EVERY 4 HOURS PRN
Qty: 240 ML | Refills: 0 | Status: SHIPPED | OUTPATIENT
Start: 2019-01-05 | End: 2019-02-21

## 2019-01-05 ASSESSMENT — ENCOUNTER SYMPTOMS
COUGH: 1
NAUSEA: 0
CHILLS: 1
VOMITING: 0
DIARRHEA: 0
FEVER: 1
SORE THROAT: 0

## 2019-01-05 NOTE — PROGRESS NOTES
SUBJECTIVE:   Denny Herrera is a 38 year old male presenting with a chief complaint of   Chief Complaint   Patient presents with     Urgent Care     URI     cough, dizziness, started yesterday fevers as high as 102       He is an established patient of [facility].    URI Adult    Onset of symptoms was 2 day(s) ago.  Course of illness is worsening.    Severity moderate  Current and Associated symptoms: fever, chills, cough - non-productive and headache  Treatment measures tried include Tylenol/Ibuprofen.  Predisposing factors include None.      Neck pain  Also reports right sided neck pain x 2 days. He was trying to break a fall and hit the right side of neck against a wall 2 days ago. Did not hit head. Had Neck surgery 6 weeks ago, s/p cervical disc arthroplasty C5-C6 at [facility]. No numbness or tingling upper extremities. Denies shoulder pain.      Review of Systems   Constitutional: Positive for chills and fever.   HENT: Negative for sore throat.    Respiratory: Positive for cough.    Gastrointestinal: Negative for diarrhea, nausea and vomiting.       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     Raynaud disease      Raynaud's syndrome 3/14/2014     Family History   Problem Relation Age of Onset     Family History Negative Other         neg for RA, IBD, psoriasis, does not know much about his father side of his family, neg for gout     Attention Deficit Disorder Other      Arthritis Mother         back DJD     Alcohol/Drug Mother      Depression Mother      Endocrine Disease Mother      Anxiety Disorder Mother       Back Pain Mother      Arthritis Brother         back DJD     Heart Disease Brother         MI at 40     Hypertension Brother      Bipolar Disorder Brother      Arthritis Maternal Grandmother         back DJD     Circulatory Maternal Grandmother      Eye Disorder Maternal Grandmother      Back Pain Maternal Grandmother      Cancer Maternal Aunt         ?type     Hypertension Brother      Circulatory Maternal Grandfather      Eye Disorder Maternal Grandfather      Hypertension Other         maternal grandparents     Depression Other      Alcohol/Drug Brother      Back Pain Brother      Heart Disease Father      Heart Disease Paternal Grandmother      Current Outpatient Medications   Medication Sig Dispense Refill     allopurinol (ZYLOPRIM) 300 MG tablet TAKE 1 TABLET BY MOUTH EVERY DAY AND 1/2 TABLET BY MOUTH EVERY EVENING 135 tablet 3     buPROPion (WELLBUTRIN XL) 150 MG 24 hr tablet Take 1 tablet (150 mg) by mouth every morning 90 tablet 1     fenofibrate 160 MG tablet TAKE 1 TABLET(160 MG) BY MOUTH DAILY 90 tablet 3     guaiFENesin-codeine (GUAIFENESIN AC) 100-10 MG/5ML syrup Take 5 mLs by mouth every 4 hours as needed for cough 240 mL 0     lisinopril (PRINIVIL/ZESTRIL) 10 MG tablet Take 1 tablet (10 mg) by mouth daily 90 tablet 3     multivitamin, therapeutic with minerals (MULTI-VITAMIN) TABS Take 1 tablet by mouth daily       Omega-3 Fatty Acids (OMEGA-3 FISH OIL PO) Take 1 capsule by mouth daily       polyethylene glycol (MIRALAX) powder Take 17 g (1 capful) by mouth daily 510 g 1     VITAMIN D, CHOLECALCIFEROL, PO Take by mouth daily       aspirin 81 MG tablet Take 81 mg by mouth daily       cyclobenzaprine (FLEXERIL) 10 MG tablet Take 0.5-1 tablets (5-10 mg) by mouth 3 times daily as needed for muscle spasms (Patient not taking: Reported on 1/5/2019) 90 tablet 1     indomethacin (INDOCIN) 50 MG capsule Take 1 capsule (50 mg) by mouth 3 times daily (with meals) (Patient not taking: Reported on 1/5/2019)  42 capsule 1     Social History     Tobacco Use     Smoking status: Never Smoker     Smokeless tobacco: Never Used     Tobacco comment: never smoked   Substance Use Topics     Alcohol use: Yes     Alcohol/week: 0.0 - 1.0 oz     Comment:  rarely (up to 4 beers a month)       OBJECTIVE  /84   Pulse 107   Temp 98.9  F (37.2  C) (Oral)   Wt 117.5 kg (259 lb)   SpO2 97%   BMI 36.64 kg/m      Physical Exam   Constitutional: He appears well-developed and well-nourished. No distress.   HENT:   Head: Normocephalic and atraumatic.   Right Ear: Tympanic membrane normal.   Left Ear: Tympanic membrane normal.   Mouth/Throat: Oropharynx is clear and moist.   Eyes: Conjunctivae are normal.   Neck: Normal range of motion.   Cardiovascular: Regular rhythm and normal heart sounds.   Pulmonary/Chest: Effort normal and breath sounds normal. No respiratory distress. He has no wheezes. He has no rales.   Musculoskeletal: Normal range of motion. He exhibits tenderness.   Right-sided neck exam no gross deformities, swelling or ecchymosis noted.  Diffuse tenderness in the right trapezius muscle. ROM normal.   Neurological: He is alert.   Skin: Skin is warm and dry.   Psychiatric: He has a normal mood and affect.   Nursing note and vitals reviewed.      Labs:  Results for orders placed or performed in visit on 01/05/19 (from the past 24 hour(s))   Influenza A/B antigen   Result Value Ref Range    Influenza A/B Agn Specimen Nasal     Influenza A Negative NEG^Negative    Influenza B Negative NEG^Negative         CERVICAL SPINE TWO VIEWS   1/5/2019 9:58 AM     HISTORY: Neck pain     COMPARISON: An MRI on 9/11/2018.                                                                      IMPRESSION: There are interval surgical changes of a disc space  prosthesis at C5-C6. The hardware is intact and unremarkable.  Vertebral body alignment is normal with no fracture or osseous lesion  seen. There is moderate disc height loss at C7-T1. The  remaining  surgical disc levels are well preserved. No other abnormality is seen.        PANDA NATHAN MD      CHEST TWO VIEWS   1/5/2019 9:58 AM     HISTORY:  Cough and fever.     COMPARISON:  8/26/2013     FINDINGS:  The heart size is normal. No mediastinal pathology is seen.  The lungs are clear. The pulmonary vasculature is normal. No  pneumothorax or pleural effusion is seen.                                                                      IMPRESSION:  Unremarkable chest.     PANDA NATHAN MD    ASSESSMENT:      ICD-10-CM    1. Cough R05 XR Chest 2 Views     guaiFENesin-codeine (GUAIFENESIN AC) 100-10 MG/5ML syrup   2. Neck pain M54.2 XR Cervical Spine 2/3 Views   3. Fever in adult R50.9 XR Chest 2 Views     Influenza A/B antigen        Medical Decision Making:    Differential Diagnosis:  URI Adult/Peds:  Bronchospasm, Pneumonia, Viral syndrome and Viral upper respiratory illness, influenza, etc...  MS Injury Pain: sprain, fracture, muscle strain, contusion and dislocation    Serious Comorbid Conditions:  Adult:  None    PLAN:    Cough: His chest x-ray is unremarkable today.  His exam is reassuring.  Guaifenesin with codeine is prescribed as needed for the cough.  Rapid influenza is negative. Advised Tylenol or Motrin as needed for fever.  Rest and push fluids.  Follow-up if any worsening symptoms.  He agrees.  Right-sided neck pain: Cervical x-ray negative for acute findings.  Suspect most likely musculoskeletal.  Anti-inflammatories recommended.  Follow-up if any worsening symptoms.  He agrees.    Followup:    If not improving or if condition worsens, follow up with your Primary Care Provider

## 2019-01-07 ENCOUNTER — FCC EXTENDED DOCUMENTATION (OUTPATIENT)
Dept: PSYCHOLOGY | Facility: CLINIC | Age: 39
End: 2019-01-07

## 2019-01-07 NOTE — PROGRESS NOTES
Discharge Summary  Multiple Sessions for ADHD assessment.     Client Name: Denny Herrera MRN#: [id number] YOB: 1980      Intake / Discharge Date: Intake: 05/21/2018 Discharge: 1/7/2019       DSM5 Diagnoses: (Sustained by DSM5 Criteria Listed Above)  Diagnoses: 296.21 (F32.0) Major Depressive Disorder, Single Episode, Mild _  Psychosocial & Contextual Factors: Difficulty Completing Work, Financial stress as he is sole bread winner for his family.    WHODAS 2.0 (12 item) Score: 28          Presenting Concern:  Client's presenting concerns include: distractibility, disorganization, and ability to complete work. The client reported that he puts in extra hours at home in the evenings and on weekends to compensate for him not being as productive during the work day.  Client stated that his symptoms have resulted in the following functional impairments: childcare / parenting, chronic disease management, educational activities, management of the household and or completion of tasks, money management, organization, relationship(s) and work / vocational responsibilities.      Reason for Discharge:  Client is satisfied with progress. Client finished the assessment process and was pleased with the results from starting an anti-depressant.       Disposition at Time of Last Encounter:   Comments:   The client appeared happier and pleased with the effects from the anti-depressant that was started by his PCP following the assessment.      Risk Management:   Client denies a history of suicidal ideation, suicide attempts, self-injurious behavior, homicidal ideation, homicidal behavior and and other safety concerns  A safety and risk management plan has not been developed at this time, however client was given the after-hours number / 911 should there be a change in any of these risk factors.      Referred To:  The client was mailed a letter stating that he can contact [facility] in the future if he would like  therapy.         Cindy Castañeda PsyD LP   1/7/2019

## 2019-01-08 ENCOUNTER — OFFICE VISIT (OUTPATIENT)
Dept: PEDIATRICS | Facility: CLINIC | Age: 39
End: 2019-01-08
Payer: COMMERCIAL

## 2019-01-08 ENCOUNTER — FCC EXTENDED DOCUMENTATION (OUTPATIENT)
Dept: PSYCHOLOGY | Facility: CLINIC | Age: 39
End: 2019-01-08

## 2019-01-08 VITALS
BODY MASS INDEX: 35.47 KG/M2 | DIASTOLIC BLOOD PRESSURE: 80 MMHG | TEMPERATURE: 99 F | HEART RATE: 85 BPM | OXYGEN SATURATION: 95 % | HEIGHT: 72 IN | SYSTOLIC BLOOD PRESSURE: 120 MMHG | WEIGHT: 261.9 LBS

## 2019-01-08 DIAGNOSIS — J01.00 ACUTE NON-RECURRENT MAXILLARY SINUSITIS: Primary | ICD-10-CM

## 2019-01-08 PROCEDURE — 99213 OFFICE O/P EST LOW 20 MIN: CPT | Performed by: INTERNAL MEDICINE

## 2019-01-08 ASSESSMENT — MIFFLIN-ST. JEOR: SCORE: 2142.97

## 2019-01-08 NOTE — PATIENT INSTRUCTIONS
This could be a nasty viral infection, but given your higher fevers, headache, nasal congestion and facial pressure this could also be a developing sinus infection.     If your symptoms persist or get worse, I would start Augmentin, an antibiotic for sinus infections. Side effects include rash and diarrhea. If symptoms seem to improve, fine to hold off on medications.     In the meantime, use ibuprofen and acetaminophen as needed, push fluids, and get lots of rest.

## 2019-01-08 NOTE — LETTER
January 8, 2019      Denny Herrera  [address] [facility] [address]        To Whom It May Concern:    Denny Herrera  was seen on 1-8-19 due to illness.  He became ill on 1-4-19, and should not return to work prior to 1-10-19 due to ongoing illness. Please contact me with questions or concerns.         Sincerely,        Patria Hedrick MD

## 2019-01-08 NOTE — PROGRESS NOTES
SUBJECTIVE:   Denny Herrera is a 38 year old male who presents to clinic today for the following health issues:      Acute Illness   Acute illness concerns: fever, sore throat   Onset: Friday     Fever: YES- 102.7F  This morning 100.5F    Chills/Sweats: YES- both     Headache (location?): YES    Sinus Pressure:YES- some     Conjunctivitis:  no    Ear Pain: YES- left     Rhinorrhea: no    Congestion: YES    Sore Throat: YES     Cough: YES-productive of yellow sputum    Wheeze: YES- a little     Decreased Appetite: YES    Nausea: YES    Vomiting: no    Diarrhea:  no    Dysuria/Freq.: no    Fatigue/Achiness: YES    Sick/Strep Exposure: YES- family home sick     Wife URI  Recently seen      Therapies Tried and outcome: fluids, ibuprofen, tylenol,  Cough syrup with codeine    Denny comes in for evaluation of persistent fevers, headaches, and and cough. His wife initially got sick about a week ago, and he has been having symptoms for the past 5 days (both of his children are ill at home a well). Was seen in urgent care 3 days ago with a negative flu test and CXR, but he comes back because he continues to have fevers, cough, headaches, sinus pressure and congestion. Fevers typically up to 102F, this AM was 100.5F. Also with muscle aches; no nausea, vomiting, diarrhea.    Wife is starting to feel better today, but he hasn't noticed any improvement.     Problem list and histories reviewed & adjusted, as indicated.  Additional history: as documented    Patient Active Problem List   Diagnosis     CARDIOVASCULAR SCREENING; LDL GOAL LESS THAN 160     Hypertriglyceridemia     Raynaud's syndrome     Hepatic steatosis     Scl-70 antibody positive     Liver lesion - needs f/u 2/2015     Knee pain     Acute pain of right knee     Status post surgery     Obesity (H)     Benign essential hypertension     Family history of premature coronary heart disease     Neck pain     Mild major depression (H)     Past Surgical History:    Procedure Laterality Date     ARTHROSCOPY KNEE Right 6/15/2016    Procedure: ARTHROSCOPY KNEE;  Surgeon: Tim Valdez MD;  Location: [facility]     ARTHROTOMY WITH FRESH OSTEOCHONDRAL ALLOGRAFT KNEE Right 6/15/2016    Procedure: ARTHROTOMY WITH FRESH OSTEOCHONDRAL ALLOGRAFT KNEE;  Surgeon: Tim Valdez MD;  Location: [facility]     C NONSPECIFIC PROCEDURE  1998    7 surgeries total on both knees. 2 for R knee, 3 L knee, ACL and meniscus arthroscopic procedures x 3, 2 open surgery     COLONOSCOPY N/A 8/19/2015    Procedure: COLONOSCOPY;  Surgeon: Timbo Greenberg MD;  Location: [facility]     ORTHOPEDIC SURGERY       OSTEOTOMY TIBIA Right 6/15/2016    Procedure: OSTEOTOMY TIBIA;  Surgeon: Tim Valdez MD;  Location: [facility]     REMOVE HARDWARE KNEE Right 6/15/2016    Procedure: REMOVE HARDWARE KNEE;  Surgeon: Tim Valdez MD;  Location: [facility]       Social History     Tobacco Use     Smoking status: Never Smoker     Smokeless tobacco: Never Used     Tobacco comment: never smoked   Substance Use Topics     Alcohol use: Yes     Alcohol/week: 0.0 - 1.0 oz     Comment:  rarely (up to 4 beers a month)     Family History   Problem Relation Age of Onset     Family History Negative Other         neg for RA, IBD, psoriasis, does not know much about his father side of his family, neg for gout     Attention Deficit Disorder Other      Arthritis Mother         back DJD     Alcohol/Drug Mother      Depression Mother      Endocrine Disease Mother      Anxiety Disorder Mother      Back Pain Mother      Arthritis Brother         back DJD     Heart Disease Brother         MI at 40     Hypertension Brother      Bipolar Disorder Brother      Arthritis Maternal Grandmother         back DJD     Circulatory Maternal Grandmother      Eye Disorder Maternal Grandmother      Back Pain Maternal Grandmother      Cancer Maternal Aunt         ?type     Hypertension Brother      Circulatory Maternal Grandfather      Eye  "Disorder Maternal Grandfather      Hypertension Other         maternal grandparents     Depression Other      Alcohol/Drug Brother      Back Pain Brother      Heart Disease Father      Heart Disease Paternal Grandmother            Reviewed and updated as needed this visit by clinical staff  Tobacco  Allergies  Meds       ROS:  Constitutional, HEENT, cardiovascular, pulmonary, gi and gu systems are negative, except as otherwise noted.    OBJECTIVE:     /80   Pulse 85   Temp 99  F (37.2  C) (Oral)   Ht 1.84 m (6' 0.44\")   Wt 118.8 kg (261 lb 14.4 oz)   SpO2 95%   BMI 35.09 kg/m    Body mass index is 35.09 kg/m .  GENERAL: fatigued and slightly ill-appearing.   EYES: Eyes grossly normal to inspection, PERRL and conjunctivae and sclerae normal  HENT: normal cephalic/atraumatic, ear canals and TM's normal, nose and mouth without ulcers or lesions, oropharynx clear, oral mucous membranes moist and sinuses: maxillary tenderness on both sides  NECK: cervical adenopathy shotty anterior nodes  RESP: lungs clear to auscultation - no rales, rhonchi or wheezes  CV: regular rate and rhythm, nl S1 S2      ASSESSMENT/PLAN:     1. Acute non-recurrent maxillary sinusitis  Likely viral in nature; however, given persistent high fevers and lack of improvement over 5 days, may be reasonable to treat with antibiotics. Discussed risks, benefits. Will continue supportive cares for another 24-36 hours; but if symptoms fail to improve or worsen will start abx. Prescription given.   - amoxicillin-clavulanate (AUGMENTIN) 875-125 MG tablet; Take 1 tablet by mouth 2 times daily for 10 days  Dispense: 20 tablet; Refill: 0    Patient Instructions   This could be a nasty viral infection, but given your higher fevers, headache, nasal congestion and facial pressure this could also be a developing sinus infection.     If your symptoms persist or get worse, I would start Augmentin, an antibiotic for sinus infections. Side effects include " rash and diarrhea. If symptoms seem to improve, fine to hold off on medications.     In the meantime, use ibuprofen and acetaminophen as needed, push fluids, and get lots of rest.       Patria Adler MD  [facility]

## 2019-01-09 ENCOUNTER — E-VISIT (OUTPATIENT)
Dept: PEDIATRICS | Facility: CLINIC | Age: 39
End: 2019-01-09

## 2019-01-09 DIAGNOSIS — R82.90 ABNORMAL URINE ODOR: ICD-10-CM

## 2019-01-09 DIAGNOSIS — R31.9 HEMATURIA, UNSPECIFIED TYPE: Primary | ICD-10-CM

## 2019-01-09 LAB
ALBUMIN UR-MCNC: 100 MG/DL
APPEARANCE UR: ABNORMAL
BILIRUB UR QL STRIP: ABNORMAL
COLOR UR AUTO: ABNORMAL
GLUCOSE UR STRIP-MCNC: NEGATIVE MG/DL
HGB UR QL STRIP: ABNORMAL
KETONES UR STRIP-MCNC: ABNORMAL MG/DL
LEUKOCYTE ESTERASE UR QL STRIP: NEGATIVE
NITRATE UR QL: NEGATIVE
NON-SQ EPI CELLS #/AREA URNS LPF: ABNORMAL /LPF
PH UR STRIP: 5.5 PH (ref 5–7)
RBC #/AREA URNS AUTO: >100 /HPF
SOURCE: ABNORMAL
SP GR UR STRIP: >1.03 (ref 1–1.03)
UROBILINOGEN UR STRIP-ACNC: 1 EU/DL (ref 0.2–1)
WBC #/AREA URNS AUTO: ABNORMAL /HPF

## 2019-01-09 PROCEDURE — 99444 ZZC PHYSICIAN ONLINE EVALUATION & MANAGEMENT SERVICE: CPT | Performed by: INTERNAL MEDICINE

## 2019-01-09 PROCEDURE — 81001 URINALYSIS AUTO W/SCOPE: CPT | Performed by: INTERNAL MEDICINE

## 2019-01-10 NOTE — TELEPHONE ENCOUNTER
SUBJECTIVE:   Denny Herrera is a 38 year old male who presents for evisit today for the following health issues:    39 y/o M with history of HTN, hepatic steatosis and recent C5-C6 herniated disc arthroscopy presents for e-vist for 2 days of urinary changes. Noting hematuria, urinary frequency with cloudy urine. Also with back pain and diarrhea. Has had fever with recent upper respiratory illness. Seen in clinic yesterday and diagnosed with sinus infection and prescribed Augmentin. Has had URI symptoms since the first of the year and seen earlier in  with negative flu test. Patient had similar symptoms when 18 and was diagnosed with UTI that required IV antibiotics. No UTI since that time. Family members have been sick with upper respiratory symptoms.    The day after initial e-visit, the patient reported that URI symptoms, diarrhea and blood improving.     ROS:  Constitutional, HEENT, cardiovascular, pulmonary, gi and gu systems are negative, except as otherwise noted.    Component      Latest Ref Rng & Units 1/9/2019   Color Urine       Red   Appearance Urine       Cloudy   Glucose Urine      NEG:Negative mg/dL Negative   Bilirubin Urine      NEG:Negative Small (A)   Ketones Urine      NEG:Negative mg/dL Trace (A)   Specific Gravity Urine      1.003 - 1.035 >1.030   Blood Urine      NEG:Negative Large (A)   pH Urine      5.0 - 7.0 pH 5.5   Protein Albumin Urine      NEG:Negative mg/dL 100 (A)   Urobilinogen Urine      0.2 - 1.0 EU/dL 1.0   Nitrite Urine      NEG:Negative Negative   Leukocyte Esterase Urine      NEG:Negative Negative   Source       Midstream Urine   WBC Urine      OTO5:0 - 5 /HPF 0 - 5   RBC Urine      OTO2:O - 2 /HPF >100 (A)   Squamous Epithelial /LPF Urine      FEW:Few /LPF Few       ASSESSMENT/PLAN:                                                      (R31.9) Hematuria, unspecified type  (primary encounter diagnosis)  Comment: UA with hematuria, but no infection. Ddx includes: stone,  glomerulonephritis from infection but would be a short course for this, malignancy less likely at this age but still possible.  Plan: **UA reflex to Microscopic FUTURE anytime  - continue to monitor  - will follow-up if does not resolve or worsens  - will plan to check repeat UA in 1 month if resolves to make sure not having microscopic component    FUTURE APPOINTMENTS:       - Follow-up visit in 1 month for UA either as lab visit or could schedule physical    [facility]ali [facility] MANNY

## 2019-01-10 NOTE — PATIENT INSTRUCTIONS
1. Let me know if the blood in urine does not go away over the next week or if you continue to have back pain.  2. Recheck urine in 1-2 months at physical

## 2019-01-17 PROBLEM — M54.2 NECK PAIN: Status: RESOLVED | Noted: 2018-06-06 | Resolved: 2019-01-17

## 2019-01-17 NOTE — PROGRESS NOTES
Discharge Note    Progress reporting period is from initial eval to Dec 27, 2018.     Denny failed to return for next follow up visit and current status is unknown.  Please see information below for last relevant information on current status.  Patient seen for Rxs Used: 2 visits.  SUBJECTIVE  Subjective changes noted by patient:  Subjective: Better.  Much improved tolerance for activity.  .  Current pain level is Current Pain level: 2/10.     Previous pain level was  Initial Pain level: 5/10.   Changes in function:  Yes (See Goal flowsheet attached for changes in current functional level)  Adverse reaction to treatment or activity: None    OBJECTIVE  Changes noted in objective findings: Objective: CROM flexion 100%, ext 80+%, R and L rotation 80+%     ASSESSMENT/PLAN  Diagnosis: s/p c5-c6 disc replacement   DIAGP:  Diagnoses of Neck pain and Status post surgery were pertinent to this visit.  Updated problem list and treatment plan:   Decreased strength - HEP  STG/LTGs have been met or progress has been made towards goals:  Yes, please see goal flowsheet for most current information  Assessment of Progress: current status is unknown.  Last current status: Assessment of progress: Pt is progressing well, Pt is progressing as expected   Self Management Plans:  HEP  I have re-evaluated this patient and find that the nature, scope, duration and intensity of the therapy is appropriate for the medical condition of the patient.  Denny continues to require the following intervention to meet STG and LTG's:  HEP.    Recommendations:  Discharge with current home program.  Patient to follow up with MD as needed.    Please refer to the daily flowsheet for treatment today, total treatment time and time spent performing 1:1 timed codes.

## 2019-01-30 DIAGNOSIS — I10 BENIGN ESSENTIAL HYPERTENSION: ICD-10-CM

## 2019-01-30 DIAGNOSIS — M10.9 PODAGRA: ICD-10-CM

## 2019-01-31 NOTE — TELEPHONE ENCOUNTER
Not due for a refill & already filled for a 90 day rx.     lisinopril (PRINIVIL/ZESTRIL) 10 MG tablet was filled on 10/25/2018, qty 90 with 3 refills.

## 2019-01-31 NOTE — TELEPHONE ENCOUNTER
Not due for a refill.     allopurinol (ZYLOPRIM) 300 MG tablet was filled on 4/26/2018, qty 135 with 3 refills.

## 2019-02-01 RX ORDER — LISINOPRIL 10 MG/1
10 TABLET ORAL DAILY
Qty: 90 TABLET | Refills: 2 | Status: SHIPPED | OUTPATIENT
Start: 2019-02-01 | End: 2020-04-09

## 2019-02-01 RX ORDER — ALLOPURINOL 300 MG/1
TABLET ORAL
Qty: 135 TABLET | Refills: 3 | Status: SHIPPED | OUTPATIENT
Start: 2019-02-01 | End: 2020-04-09

## 2019-02-01 NOTE — TELEPHONE ENCOUNTER
Sent remainder prescription 10/25/2018 prescription lisinopril to new mail order pharmacy.    Ramya Hennessy RN

## 2019-02-01 NOTE — TELEPHONE ENCOUNTER
"Routing refill request to provider for review/approval because:  New mail order pharmacy with insurance change.  Labs not current:  CBC, uric acid  Gali COPPOLA RN - Triage  [facility]                Requested Prescriptions   Pending Prescriptions Disp Refills     allopurinol (ZYLOPRIM) 300 MG tablet 135 tablet 3    Gout Agents Protocol Failed - 1/31/2019  7:53 AM       Failed - CBC on file in past 12 months    Recent Labs   Lab Test 01/17/17  0714   WBC 7.1   RBC 4.45   HGB 14.0   HCT 39.9*                   Failed - Has Uric Acid on file in past 12 months and value is less than 6    Recent Labs   Lab Test 01/17/17  0714   URIC 4.7     If level is 6mg/dL or greater, ok to refill one time and refer to provider.          Passed - ALT on file in past 12 months    Recent Labs   Lab Test 11/05/18  1431   *            Passed - Recent (12 mo) or future (30 days) visit within the authorizing provider's specialty    Patient had office visit in the last 12 months or has a visit in the next 30 days with authorizing provider or within the authorizing provider's specialty.  See \"Patient Info\" tab in inbasket, or \"Choose Columns\" in Meds & Orders section of the refill encounter.             Passed - Medication is active on med list       Passed - Patient is age 18 or older       Passed - Normal serum creatinine on file in the past 12 months    Recent Labs   Lab Test 10/26/18  1641   CR 0.90               "

## 2019-02-07 DIAGNOSIS — E78.1 HYPERTRIGLYCERIDEMIA: ICD-10-CM

## 2019-02-07 RX ORDER — FENOFIBRATE 160 MG/1
TABLET ORAL
Qty: 90 TABLET | Refills: 3 | Status: CANCELLED | OUTPATIENT
Start: 2019-02-07

## 2019-02-08 NOTE — TELEPHONE ENCOUNTER
"Requested Prescriptions   Pending Prescriptions Disp Refills     fenofibrate (TRIGLIDE/LOFIBRA) 160 MG tablet  Last Written Prescription Date:  04/26/2018  Last Fill Quantity: 90 tablet,  # refills: 3   Last office visit: 1/8/2019 with prescribing provider:  Patria Hedrick MD   Future Office Visit:     90 tablet 3    Fibrates Failed - 2/7/2019  7:18 PM       Failed - Lipid panel on file in past 12 months    Recent Labs   Lab Test 11/03/17  0848  06/13/14  0912   CHOL 178   < > 148   TRIG 202*   < > 314*   HDL 29*   < > 24*   *   < > 61   NHDL 149*   < >  --    VLDL  --   --  63*   CHOLHDLRATIO  --   --  6.1*    < > = values in this interval not displayed.              Passed - No abnormal creatine kinase in past 12 months    Recent Labs   Lab Test 03/15/13  1522                  Passed - Recent (12 mo) or future (30 days) visit within the authorizing provider's specialty    Patient had office visit in the last 12 months or has a visit in the next 30 days with authorizing provider or within the authorizing provider's specialty.  See \"Patient Info\" tab in inbasket, or \"Choose Columns\" in Meds & Orders section of the refill encounter.             Passed - Medication is active on med list       Passed - Patient is age 18 or older          "

## 2019-02-12 DIAGNOSIS — F90.0 ATTENTION DEFICIT HYPERACTIVITY DISORDER (ADHD), PREDOMINANTLY INATTENTIVE TYPE: ICD-10-CM

## 2019-02-12 DIAGNOSIS — F32.0 MILD MAJOR DEPRESSION (H): ICD-10-CM

## 2019-02-12 DIAGNOSIS — E78.1 HYPERTRIGLYCERIDEMIA: ICD-10-CM

## 2019-02-13 RX ORDER — BUPROPION HYDROCHLORIDE 150 MG/1
150 TABLET ORAL EVERY MORNING
Qty: 90 TABLET | Refills: 0 | Status: SHIPPED | OUTPATIENT
Start: 2019-02-13 | End: 2019-02-21

## 2019-02-13 RX ORDER — FENOFIBRATE 160 MG/1
TABLET ORAL
Qty: 90 TABLET | Refills: 0 | Status: SHIPPED | OUTPATIENT
Start: 2019-02-13 | End: 2019-02-21

## 2019-02-13 NOTE — TELEPHONE ENCOUNTER
rx sent to mail order without refills. Overdue for visit and fasting labs. Please let know.    Gali Del Valle MD  Internal Medicine/Pediatrics

## 2019-02-13 NOTE — TELEPHONE ENCOUNTER
"Requested Prescriptions   Pending Prescriptions Disp Refills     fenofibrate (TRIGLIDE/LOFIBRA) 160 MG tablet 90 tablet 3    Fibrates Failed - 2/13/2019  8:11 AM       Failed - Lipid panel on file in past 12 months    Recent Labs   Lab Test 11/03/17  0848  06/13/14  0912   CHOL 178   < > 148   TRIG 202*   < > 314*   HDL 29*   < > 24*   *   < > 61   NHDL 149*   < >  --    VLDL  --   --  63*   CHOLHDLRATIO  --   --  6.1*    < > = values in this interval not displayed.              Passed - No abnormal creatine kinase in past 12 months    Recent Labs   Lab Test 03/15/13  1522                  Passed - Recent (12 mo) or future (30 days) visit within the authorizing provider's specialty    Patient had office visit in the last 12 months or has a visit in the next 30 days with authorizing provider or within the authorizing provider's specialty.  See \"Patient Info\" tab in inbasket, or \"Choose Columns\" in Meds & Orders section of the refill encounter.             Passed - Medication is active on med list       Passed - Patient is age 18 or older        LOV Due for office visit and labs. RTC 1/25/2019 per LOV note 10/25/2018.    Last fasting lab 11/3/2017.    Previous prescription at [facility].    Asking for 3 month refill to mail order pharmacy.    Ramya Hennessy RN        "

## 2019-02-21 ENCOUNTER — OFFICE VISIT (OUTPATIENT)
Dept: PEDIATRICS | Facility: CLINIC | Age: 39
End: 2019-02-21
Payer: COMMERCIAL

## 2019-02-21 VITALS
HEIGHT: 73 IN | WEIGHT: 263.7 LBS | DIASTOLIC BLOOD PRESSURE: 80 MMHG | OXYGEN SATURATION: 97 % | SYSTOLIC BLOOD PRESSURE: 134 MMHG | TEMPERATURE: 98.2 F | HEART RATE: 64 BPM | BODY MASS INDEX: 34.95 KG/M2

## 2019-02-21 DIAGNOSIS — F32.0 MILD MAJOR DEPRESSION (H): Primary | ICD-10-CM

## 2019-02-21 DIAGNOSIS — I10 BENIGN ESSENTIAL HYPERTENSION: ICD-10-CM

## 2019-02-21 DIAGNOSIS — K62.5 RECTAL BLEEDING: ICD-10-CM

## 2019-02-21 DIAGNOSIS — E78.1 HYPERTRIGLYCERIDEMIA: ICD-10-CM

## 2019-02-21 DIAGNOSIS — M1A.9XX0 CHRONIC GOUT WITHOUT TOPHUS, UNSPECIFIED CAUSE, UNSPECIFIED SITE: ICD-10-CM

## 2019-02-21 DIAGNOSIS — K76.0 HEPATIC STEATOSIS: ICD-10-CM

## 2019-02-21 DIAGNOSIS — E66.01 MORBID OBESITY (H): ICD-10-CM

## 2019-02-21 DIAGNOSIS — R31.9 HEMATURIA, UNSPECIFIED TYPE: ICD-10-CM

## 2019-02-21 DIAGNOSIS — R73.03 PREDIABETES: ICD-10-CM

## 2019-02-21 DIAGNOSIS — F90.0 ATTENTION DEFICIT HYPERACTIVITY DISORDER (ADHD), PREDOMINANTLY INATTENTIVE TYPE: ICD-10-CM

## 2019-02-21 LAB
ALBUMIN SERPL-MCNC: 4.4 G/DL (ref 3.4–5)
ALP SERPL-CCNC: 25 U/L (ref 40–150)
ALT SERPL W P-5'-P-CCNC: 94 U/L (ref 0–70)
ANION GAP SERPL CALCULATED.3IONS-SCNC: 1 MMOL/L (ref 3–14)
AST SERPL W P-5'-P-CCNC: 57 U/L (ref 0–45)
BILIRUB SERPL-MCNC: 0.5 MG/DL (ref 0.2–1.3)
BUN SERPL-MCNC: 16 MG/DL (ref 7–30)
CALCIUM SERPL-MCNC: 9 MG/DL (ref 8.5–10.1)
CHLORIDE SERPL-SCNC: 108 MMOL/L (ref 94–109)
CHOLEST SERPL-MCNC: 184 MG/DL
CO2 SERPL-SCNC: 28 MMOL/L (ref 20–32)
CREAT SERPL-MCNC: 0.94 MG/DL (ref 0.66–1.25)
ERYTHROCYTE [DISTWIDTH] IN BLOOD BY AUTOMATED COUNT: 12.7 % (ref 10–15)
GFR SERPL CREATININE-BSD FRML MDRD: >90 ML/MIN/{1.73_M2}
GLUCOSE SERPL-MCNC: 111 MG/DL (ref 70–99)
HCT VFR BLD AUTO: 39 % (ref 40–53)
HDLC SERPL-MCNC: 28 MG/DL
HGB BLD-MCNC: 13.7 G/DL (ref 13.3–17.7)
LDLC SERPL CALC-MCNC: 115 MG/DL
MCH RBC QN AUTO: 31.6 PG (ref 26.5–33)
MCHC RBC AUTO-ENTMCNC: 35.1 G/DL (ref 31.5–36.5)
MCV RBC AUTO: 90 FL (ref 78–100)
NONHDLC SERPL-MCNC: 156 MG/DL
PLATELET # BLD AUTO: 253 10E9/L (ref 150–450)
POTASSIUM SERPL-SCNC: 4.1 MMOL/L (ref 3.4–5.3)
PROT SERPL-MCNC: 8.1 G/DL (ref 6.8–8.8)
RBC # BLD AUTO: 4.34 10E12/L (ref 4.4–5.9)
SODIUM SERPL-SCNC: 139 MMOL/L (ref 133–144)
TRIGL SERPL-MCNC: 204 MG/DL
URATE SERPL-MCNC: 5.2 MG/DL (ref 3.5–7.2)
WBC # BLD AUTO: 6.3 10E9/L (ref 4–11)

## 2019-02-21 PROCEDURE — 99214 OFFICE O/P EST MOD 30 MIN: CPT | Performed by: INTERNAL MEDICINE

## 2019-02-21 PROCEDURE — 80061 LIPID PANEL: CPT | Performed by: INTERNAL MEDICINE

## 2019-02-21 PROCEDURE — 36415 COLL VENOUS BLD VENIPUNCTURE: CPT | Performed by: INTERNAL MEDICINE

## 2019-02-21 PROCEDURE — 80053 COMPREHEN METABOLIC PANEL: CPT | Performed by: INTERNAL MEDICINE

## 2019-02-21 PROCEDURE — 85027 COMPLETE CBC AUTOMATED: CPT | Performed by: INTERNAL MEDICINE

## 2019-02-21 PROCEDURE — 84550 ASSAY OF BLOOD/URIC ACID: CPT | Performed by: INTERNAL MEDICINE

## 2019-02-21 RX ORDER — BUPROPION HYDROCHLORIDE 150 MG/1
150 TABLET ORAL EVERY MORNING
Qty: 90 TABLET | Refills: 0 | Status: SHIPPED | OUTPATIENT
Start: 2019-02-21 | End: 2019-07-08

## 2019-02-21 RX ORDER — FENOFIBRATE 160 MG/1
TABLET ORAL
Qty: 90 TABLET | Refills: 0 | Status: SHIPPED | OUTPATIENT
Start: 2019-02-21 | End: 2019-07-08

## 2019-02-21 ASSESSMENT — MIFFLIN-ST. JEOR: SCORE: 2152.08

## 2019-02-21 ASSESSMENT — ANXIETY QUESTIONNAIRES
GAD7 TOTAL SCORE: 0
3. WORRYING TOO MUCH ABOUT DIFFERENT THINGS: NOT AT ALL
1. FEELING NERVOUS, ANXIOUS, OR ON EDGE: NOT AT ALL
5. BEING SO RESTLESS THAT IT IS HARD TO SIT STILL: NOT AT ALL
6. BECOMING EASILY ANNOYED OR IRRITABLE: NOT AT ALL
2. NOT BEING ABLE TO STOP OR CONTROL WORRYING: NOT AT ALL
7. FEELING AFRAID AS IF SOMETHING AWFUL MIGHT HAPPEN: NOT AT ALL
IF YOU CHECKED OFF ANY PROBLEMS ON THIS QUESTIONNAIRE, HOW DIFFICULT HAVE THESE PROBLEMS MADE IT FOR YOU TO DO YOUR WORK, TAKE CARE OF THINGS AT HOME, OR GET ALONG WITH OTHER PEOPLE: NOT DIFFICULT AT ALL

## 2019-02-21 ASSESSMENT — PATIENT HEALTH QUESTIONNAIRE - PHQ9
5. POOR APPETITE OR OVEREATING: NOT AT ALL
SUM OF ALL RESPONSES TO PHQ QUESTIONS 1-9: 2

## 2019-02-21 NOTE — PATIENT INSTRUCTIONS
1. Labs today: blood counts, uric acid levels, cholesterol, diabetes screen, liver function, kidney function and recheck urine to make sure the blood is gone  2. You will be contacted to set up an appointment with FREDDIE FRAZIER [phone]  3. Refilled medications

## 2019-02-21 NOTE — PROGRESS NOTES
SUBJECTIVE:   Denny Herrera is a 38 year old male who presents to clinic today for the following health issues:    Hyperlipidemia Follow-Up      Rate your low fat/cholesterol diet?: not monitoring fat    Taking statin?  No    Other lipid medications/supplements?:  Fenofibrate, without side effects    Continues on fenofibrate without side effects.     Hypertension Follow-up      Outpatient blood pressures are not being checked.    Low Salt Diet: not monitoring salt    Continues on lisinopril. No side effects. Not checking blood pressures.    Depression Followup    Status since last visit: Improved     See PHQ-9 for current symptoms.  Other associated symptoms: None    Complicating factors:   Significant life event:  No   Current substance abuse:  None  Anxiety or Panic symptoms:  No    wellbutrin helping symptoms a lot. Also helping with attention. Has been out a couple of days with mail order taking awhile to send out.    PHQ 9/10/2018 10/3/2018 2/21/2019   PHQ-9 Total Score 15 2 2   Q9: Suicide Ideation Not at all Not at all Not at all     Blood in stool - has had for last few years. only at end of bowel movement. Has bloody mucous when wipes. Present at least 80% of the time. Stools now mostly soft with using miralax most days. Had colonoscopy in 2015 and was normal. Did not mention hemorrhoids, but patient notes he has had them in the past.    Neck pain: much better after surgery.    Hematuria: had e-visit for possible UTI a couple of months ago with change in urine color, but no pain. UA at that time with > 100 RBCs/hpf. Symptoms have cleared, but has not returned for recheck.    Gout: continues on allopurinol. Has not had another attack. If stops medication, will have an attack pretty quickly.    Elevated LFTs. Very elevated at pre-op. Had to cancel initial surgery and were downtrending. Has not rechecked. Had fatty liver on US.      PHQ-9  English  PHQ-9   Any Language  Suicide Assessment Five-step  "Evaluation and Treatment (SAFE-T)      Amount of exercise or physical activity: 1 day/week for an average of 15-30 minutes    Problems taking medications regularly: No    Medication side effects: none    Diet: regular (no restrictions)    Reviewed and updated as needed this visit by clinical staff  Tobacco  Allergies  Meds  Problems  Med Hx  Surg Hx  Fam Hx  Soc Hx        Reviewed and updated as needed this visit by Provider  Tobacco  Allergies  Meds  Problems  Med Hx  Surg Hx  Fam Hx         -------------------------------------    ROS:  Constitutional, HEENT, cardiovascular, pulmonary, gi and gu systems are negative, except as otherwise noted.    OBJECTIVE:                                                    /80 (BP Location: Right arm, Patient Position: Sitting, Cuff Size: Adult Large)   Pulse 64   Temp 98.2  F (36.8  C) (Tympanic)   Ht 1.842 m (6' 0.5\")   Wt 119.6 kg (263 lb 11.2 oz)   SpO2 97%   BMI 35.27 kg/m     Body mass index is 35.27 kg/m .  General Appearance: obese, alert and no distress  Eyes:   no discharge, erythema.  Normal pupils.  Respiratory: lungs clear to auscultation - no rales, rhonchi or wheezes.  Cardiovascular: regular rate and rhythm, normal S1 S2, no S3 or S4 and no murmur, click or rub.  No peripheral edema.  Abd: obese, soft, NT/ND  Skin: no rashes or lesions.  Well perfused and normal turgor.    Diagnostic Test Results:  Results for orders placed or performed in visit on 02/21/19   Lipid panel reflex to direct LDL Fasting   Result Value Ref Range    Cholesterol 184 <200 mg/dL    Triglycerides 204 (H) <150 mg/dL    HDL Cholesterol 28 (L) >39 mg/dL    LDL Cholesterol Calculated 115 (H) <100 mg/dL    Non HDL Cholesterol 156 (H) <130 mg/dL   Uric acid   Result Value Ref Range    Uric Acid 5.2 3.5 - 7.2 mg/dL   CBC with platelets   Result Value Ref Range    WBC 6.3 4.0 - 11.0 10e9/L    RBC Count 4.34 (L) 4.4 - 5.9 10e12/L    Hemoglobin 13.7 13.3 - 17.7 g/dL    " Hematocrit 39.0 (L) 40.0 - 53.0 %    MCV 90 78 - 100 fl    MCH 31.6 26.5 - 33.0 pg    MCHC 35.1 31.5 - 36.5 g/dL    RDW 12.7 10.0 - 15.0 %    Platelet Count 253 150 - 450 10e9/L   Comprehensive metabolic panel   Result Value Ref Range    Sodium 139 133 - 144 mmol/L    Potassium 4.1 3.4 - 5.3 mmol/L    Chloride 108 94 - 109 mmol/L    Carbon Dioxide 28 20 - 32 mmol/L    Anion Gap 1 (L) 3 - 14 mmol/L    Glucose 111 (H) 70 - 99 mg/dL    Urea Nitrogen 16 7 - 30 mg/dL    Creatinine 0.94 0.66 - 1.25 mg/dL    GFR Estimate >90 >60 mL/min/[1.73_m2]    GFR Estimate If Black >90 >60 mL/min/[1.73_m2]    Calcium 9.0 8.5 - 10.1 mg/dL    Bilirubin Total 0.5 0.2 - 1.3 mg/dL    Albumin 4.4 3.4 - 5.0 g/dL    Protein Total 8.1 6.8 - 8.8 g/dL    Alkaline Phosphatase 25 (L) 40 - 150 U/L    ALT 94 (H) 0 - 70 U/L    AST 57 (H) 0 - 45 U/L        ASSESSMENT/PLAN:                                                      (F32.0) Mild major depression (H)  (primary encounter diagnosis)  Comment: improved  Plan: buPROPion (WELLBUTRIN XL) 150 MG 24 hr tablet  - continue wellbutrin 150 mg daily    (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 Patient left without leaving a urine sample. Recommended return to leave this.    (I10) Benign essential hypertension  Comment: controlled  Plan: continue lisinopril. Lytes done today.    (E78.1) Hypertriglyceridemia  Comment: controlled  Plan: Lipid panel reflex to direct LDL Fasting,         Comprehensive metabolic panel, fenofibrate         (TRIGLIDE/LOFIBRA) 160 MG tablet, **UA reflex         to Microscopic FUTURE  "anytime  - continue fenofibrate    (M1A.9XX0) Chronic gout without tophus, unspecified cause, unspecified site  Comment: controlled  Plan: Uric acid, CBC with platelets  - continue allopurinol  - needs yearly CBC and uric acid level    (K62.5) Rectal bleeding  Comment: persistent for last 4-5 years. Previous colonoscopy negative. Sounds like outlet bleeding.  Plan: GASTROENTEROLOGY ADULT REF CONSULT ONLY  - continue miralax - discussed could increase to 1.5 Tbsp daily  - referral to GI for evaluation given prolonged symptoms.    (R73.03) Prediabetes  Comment: fasting glucose 111  Plan:   - recommend limit carbohydrates, increase exercise  - recheck in 1 year with a1c    BMI:   Estimated body mass index is 35.27 kg/m  as calculated from the following:    Height as of this encounter: 1.842 m (6' 0.5\").    Weight as of this encounter: 119.6 kg (263 lb 11.2 oz).   Weight management plan: Discussed healthy diet and exercise guidelines      FUTURE APPOINTMENTS:       - Follow-up visit in 1 year    [facility] MANNY          "

## 2019-02-21 NOTE — LETTER
My Depression Action Plan  Name: Denny Herrera   Date of Birth 1980  Date: 2/21/2019    My doctor: Gali Del Valle   My clinic: [address]  [address]  [facility] [address] [phone]          GREEN    ZONE   Good Control    What it looks like:     Things are going generally well. You have normal up s and down s. You may even feel depressed from time to time, but bad moods usually last less than a day.   What you need to do:  1. Continue to care for yourself (see self care plan)  2. Check your depression survival kit and update it as needed  3. Follow your physician s recommendations including any medication.  4. Do not stop taking medication unless you consult with your physician first.           YELLOW         ZONE Getting Worse    What it looks like:     Depression is starting to interfere with your life.     It may be hard to get out of bed; you may be starting to isolate yourself from others.    Symptoms of depression are starting to last most all day and this has happened for several days.     You may have suicidal thoughts but they are not constant.   What you need to do:     1. Call your care team, your response to treatment will improve if you keep your care team informed of your progress. Yellow periods are signs an adjustment may need to be made.     2. Continue your self-care, even if you have to fake it!    3. Talk to someone in your support network    4. Open up your depression survival kit           RED    ZONE Medical Alert - Get Help    What it looks like:     Depression is seriously interfering with your life.     You may experience these or other symptoms: You can t get out of bed most days, can t work or engage in other necessary activities, you have trouble taking care of basic hygiene, or basic responsibilities, thoughts of suicide or death that will not go away, self-injurious behavior.     What you need to do:  1. Call your  care team and request a same-day appointment. If they are not available (weekends or after hours) call your local crisis line, emergency room or 911.            Depression Self Care Plan / Survival Kit    Self-Care for Depression  Here s the deal. Your body and mind are really not as separate as most people think.  What you do and think affects how you feel and how you feel influences what you do and think. This means if you do things that people who feel good do, it will help you feel better.  Sometimes this is all it takes.  There is also a place for medication and therapy depending on how severe your depression is, so be sure to consult with your medical provider and/ or Behavioral Health Consultant if your symptoms are worsening or not improving.     In order to better manage my stress, I will:    Exercise  Get some form of exercise, every day. This will help reduce pain and release endorphins, the  feel good  chemicals in your brain. This is almost as good as taking antidepressants!  This is not the same as joining a gym and then never going! (they count on that by the way ) It can be as simple as just going for a walk or doing some gardening, anything that will get you moving.      Hygiene   Maintain good hygiene (Get out of bed in the morning, Make your bed, Brush your teeth, Take a shower, and Get dressed like you were going to work, even if you are unemployed).  If your clothes don't fit try to get ones that do.    Diet  I will strive to eat foods that are good for me, drink plenty of water, and avoid excessive sugar, caffeine, alcohol, and other mood-altering substances.  Some foods that are helpful in depression are: complex carbohydrates, B vitamins, flaxseed, fish or fish oil, fresh fruits and vegetables.    Psychotherapy  I agree to participate in Individual Therapy (if recommended).    Medication  If prescribed medications, I agree to take them.  Missing doses can result in serious side effects.  I  understand that drinking alcohol, or other illicit drug use, may cause potential side effects.  I will not stop my medication abruptly without first discussing it with my provider.    Staying Connected With Others  I will stay in touch with my friends, family members, and my primary care provider/team.    Use your imagination  Be creative.  We all have a creative side; it doesn t matter if it s oil painting, sand castles, or mud pies! This will also kick up the endorphins.    Witness Beauty  (AKA stop and smell the roses) Take a look outside, even in mid-winter. Notice colors, textures. Watch the squirrels and birds.     Service to others  Be of service to others.  There is always someone else in need.  By helping others we can  get out of ourselves  and remember the really important things.  This also provides opportunities for practicing all the other parts of the program.    Humor  Laugh and be silly!  Adjust your TV habits for less news and crime-drama and more comedy.    Control your stress  Try breathing deep, massage therapy, biofeedback, and meditation. Find time to relax each day.     My support system    Clinic Contact:  Phone number:    Contact 1:  Phone number:    Contact 2:  Phone number:    Mormonism/:  Phone number:    Therapist:  Phone number:    Local crisis center:    Phone number:    Other community support:  Phone number:

## 2019-02-22 ASSESSMENT — ANXIETY QUESTIONNAIRES: GAD7 TOTAL SCORE: 0

## 2019-03-13 ENCOUNTER — TRANSFERRED RECORDS (OUTPATIENT)
Dept: HEALTH INFORMATION MANAGEMENT | Facility: CLINIC | Age: 39
End: 2019-03-13

## 2019-03-18 ENCOUNTER — TRANSFERRED RECORDS (OUTPATIENT)
Dept: HEALTH INFORMATION MANAGEMENT | Facility: CLINIC | Age: 39
End: 2019-03-18

## 2019-03-18 ENCOUNTER — TELEPHONE (OUTPATIENT)
Dept: PEDIATRICS | Facility: CLINIC | Age: 39
End: 2019-03-18

## 2019-03-18 DIAGNOSIS — K64.4 EXTERNAL HEMORRHOIDS: Primary | ICD-10-CM

## 2019-03-18 RX ORDER — OXYCODONE HYDROCHLORIDE 5 MG/1
5 CAPSULE ORAL EVERY 4 HOURS PRN
COMMUNITY
End: 2019-06-27

## 2019-03-18 NOTE — TELEPHONE ENCOUNTER
Patient has a painful hemorrhoid that is about 1.5 inches long.  Bleeding noted when he goes to the bathroom  States this is extremely painful.  Has tried Preparation H, Sitz baths, Colace and Miralax without any relief in symptoms.  Referral placed to [facility] surgeons.  Patient was unable to get an appointment with JARED FRAZIER.  Discussed with Dr. Del Valle and she said that with this degree of pain, patient may have a thrombosed hemorrhoid and will need to see [facility]Rect[facility] surgeons.  Referral placed and I gave patient the number to call for an appointment.  Patient is in agreement.  No further questions.  Will call back if any other questions or concerns.  ABDON Low RN

## 2019-03-18 NOTE — TELEPHONE ENCOUNTER
Reason for call:  Symptom   Symptom or request: hemmorhoids    Duration (how long have symptoms been present): unknown  Have you been treated for this before? YES    Additional comments: Patient would like to know of possible treatment options.     Phone number to reach patient:  Home number on file [phone] (home)    Best Time:  any    Can we leave a detailed message on this number?  YES

## 2019-03-19 ENCOUNTER — ANESTHESIA EVENT (OUTPATIENT)
Dept: SURGERY | Facility: CLINIC | Age: 39
End: 2019-03-19
Payer: COMMERCIAL

## 2019-03-19 ENCOUNTER — ANESTHESIA (OUTPATIENT)
Dept: SURGERY | Facility: CLINIC | Age: 39
End: 2019-03-19
Payer: COMMERCIAL

## 2019-03-19 ENCOUNTER — HOSPITAL ENCOUNTER (OUTPATIENT)
Facility: CLINIC | Age: 39
Discharge: HOME | End: 2019-03-19
Attending: COLON & RECTAL SURGERY | Admitting: COLON & RECTAL SURGERY
Payer: COMMERCIAL

## 2019-03-19 VITALS
SYSTOLIC BLOOD PRESSURE: 122 MMHG | RESPIRATION RATE: 16 BRPM | HEART RATE: 75 BPM | BODY MASS INDEX: 34.57 KG/M2 | TEMPERATURE: 98.2 F | DIASTOLIC BLOOD PRESSURE: 81 MMHG | HEIGHT: 73 IN | OXYGEN SATURATION: 99 % | WEIGHT: 260.8 LBS

## 2019-03-19 DIAGNOSIS — K64.5 THROMBOSED EXTERNAL HEMORRHOID: Primary | ICD-10-CM

## 2019-03-19 PROCEDURE — 25000128 H RX IP 250 OP 636

## 2019-03-19 PROCEDURE — 27210794 ZZH OR GENERAL SUPPLY STERILE: Performed by: COLON & RECTAL SURGERY

## 2019-03-19 PROCEDURE — 36000050 ZZH SURGERY LEVEL 2 1ST 30 MIN: Performed by: COLON & RECTAL SURGERY

## 2019-03-19 PROCEDURE — 36000052 ZZH SURGERY LEVEL 2 EA 15 ADDTL MIN: Performed by: COLON & RECTAL SURGERY

## 2019-03-19 PROCEDURE — 71000012 ZZH RECOVERY PHASE 1 LEVEL 1 FIRST HR: Performed by: COLON & RECTAL SURGERY

## 2019-03-19 PROCEDURE — 71000027 ZZH RECOVERY PHASE 2 EACH 15 MINS: Performed by: COLON & RECTAL SURGERY

## 2019-03-19 PROCEDURE — 88304 TISSUE EXAM BY PATHOLOGIST: CPT | Performed by: COLON & RECTAL SURGERY

## 2019-03-19 PROCEDURE — 37000009 ZZH ANESTHESIA TECHNICAL FEE, EACH ADDTL 15 MIN: Performed by: COLON & RECTAL SURGERY

## 2019-03-19 PROCEDURE — 37000008 ZZH ANESTHESIA TECHNICAL FEE, 1ST 30 MIN: Performed by: COLON & RECTAL SURGERY

## 2019-03-19 PROCEDURE — 40000306 ZZH STATISTIC PRE PROC ASSESS II: Performed by: COLON & RECTAL SURGERY

## 2019-03-19 PROCEDURE — 71000013 ZZH RECOVERY PHASE 1 LEVEL 1 EA ADDTL HR: Performed by: COLON & RECTAL SURGERY

## 2019-03-19 PROCEDURE — 25000125 ZZHC RX 250

## 2019-03-19 PROCEDURE — 25800030 ZZH RX IP 258 OP 636: Performed by: ANESTHESIOLOGY

## 2019-03-19 PROCEDURE — 25000132 ZZH RX MED GY IP 250 OP 250 PS 637: Performed by: COLON & RECTAL SURGERY

## 2019-03-19 PROCEDURE — 25000125 ZZHC RX 250: Performed by: COLON & RECTAL SURGERY

## 2019-03-19 PROCEDURE — 88304 TISSUE EXAM BY PATHOLOGIST: CPT | Mod: 26 | Performed by: COLON & RECTAL SURGERY

## 2019-03-19 RX ORDER — FENTANYL CITRATE 50 UG/ML
INJECTION, SOLUTION INTRAMUSCULAR; INTRAVENOUS PRN
Status: DISCONTINUED | OUTPATIENT
Start: 2019-03-19 | End: 2019-03-19

## 2019-03-19 RX ORDER — ACETAMINOPHEN 325 MG/1
975 TABLET ORAL ONCE
Status: COMPLETED | OUTPATIENT
Start: 2019-03-19 | End: 2019-03-19

## 2019-03-19 RX ORDER — MEPERIDINE HYDROCHLORIDE 50 MG/ML
12.5 INJECTION INTRAMUSCULAR; INTRAVENOUS; SUBCUTANEOUS
Status: DISCONTINUED | OUTPATIENT
Start: 2019-03-19 | End: 2019-03-19 | Stop reason: HOSPADM

## 2019-03-19 RX ORDER — SODIUM CHLORIDE, SODIUM LACTATE, POTASSIUM CHLORIDE, CALCIUM CHLORIDE 600; 310; 30; 20 MG/100ML; MG/100ML; MG/100ML; MG/100ML
INJECTION, SOLUTION INTRAVENOUS CONTINUOUS
Status: DISCONTINUED | OUTPATIENT
Start: 2019-03-19 | End: 2019-03-19 | Stop reason: HOSPADM

## 2019-03-19 RX ORDER — LIDOCAINE 40 MG/G
CREAM TOPICAL
Status: DISCONTINUED | OUTPATIENT
Start: 2019-03-19 | End: 2019-03-19 | Stop reason: HOSPADM

## 2019-03-19 RX ORDER — OXYCODONE HYDROCHLORIDE 5 MG/1
5 TABLET ORAL EVERY 4 HOURS PRN
Qty: 10 TABLET | Refills: 0 | Status: SHIPPED | OUTPATIENT
Start: 2019-03-19 | End: 2019-06-27

## 2019-03-19 RX ORDER — HYDRALAZINE HYDROCHLORIDE 20 MG/ML
2.5-5 INJECTION INTRAMUSCULAR; INTRAVENOUS EVERY 10 MIN PRN
Status: DISCONTINUED | OUTPATIENT
Start: 2019-03-19 | End: 2019-03-19 | Stop reason: HOSPADM

## 2019-03-19 RX ORDER — LIDOCAINE HYDROCHLORIDE 10 MG/ML
INJECTION, SOLUTION INFILTRATION; PERINEURAL PRN
Status: DISCONTINUED | OUTPATIENT
Start: 2019-03-19 | End: 2019-03-19

## 2019-03-19 RX ORDER — GLYCOPYRROLATE 0.2 MG/ML
INJECTION, SOLUTION INTRAMUSCULAR; INTRAVENOUS PRN
Status: DISCONTINUED | OUTPATIENT
Start: 2019-03-19 | End: 2019-03-19

## 2019-03-19 RX ORDER — DEXAMETHASONE SODIUM PHOSPHATE 4 MG/ML
INJECTION, SOLUTION INTRA-ARTICULAR; INTRALESIONAL; INTRAMUSCULAR; INTRAVENOUS; SOFT TISSUE PRN
Status: DISCONTINUED | OUTPATIENT
Start: 2019-03-19 | End: 2019-03-19

## 2019-03-19 RX ORDER — FENTANYL CITRATE 50 UG/ML
25-50 INJECTION, SOLUTION INTRAMUSCULAR; INTRAVENOUS
Status: DISCONTINUED | OUTPATIENT
Start: 2019-03-19 | End: 2019-03-19 | Stop reason: HOSPADM

## 2019-03-19 RX ORDER — HYDROMORPHONE HYDROCHLORIDE 1 MG/ML
.3-.5 INJECTION, SOLUTION INTRAMUSCULAR; INTRAVENOUS; SUBCUTANEOUS EVERY 10 MIN PRN
Status: DISCONTINUED | OUTPATIENT
Start: 2019-03-19 | End: 2019-03-19 | Stop reason: HOSPADM

## 2019-03-19 RX ORDER — ONDANSETRON 4 MG/1
4 TABLET, ORALLY DISINTEGRATING ORAL EVERY 30 MIN PRN
Status: DISCONTINUED | OUTPATIENT
Start: 2019-03-19 | End: 2019-03-19 | Stop reason: HOSPADM

## 2019-03-19 RX ORDER — LABETALOL 20 MG/4 ML (5 MG/ML) INTRAVENOUS SYRINGE
10
Status: DISCONTINUED | OUTPATIENT
Start: 2019-03-19 | End: 2019-03-19 | Stop reason: HOSPADM

## 2019-03-19 RX ORDER — ONDANSETRON 2 MG/ML
INJECTION INTRAMUSCULAR; INTRAVENOUS PRN
Status: DISCONTINUED | OUTPATIENT
Start: 2019-03-19 | End: 2019-03-19

## 2019-03-19 RX ORDER — PROPOFOL 10 MG/ML
INJECTION, EMULSION INTRAVENOUS PRN
Status: DISCONTINUED | OUTPATIENT
Start: 2019-03-19 | End: 2019-03-19

## 2019-03-19 RX ORDER — NALOXONE HYDROCHLORIDE 0.4 MG/ML
.1-.4 INJECTION, SOLUTION INTRAMUSCULAR; INTRAVENOUS; SUBCUTANEOUS
Status: DISCONTINUED | OUTPATIENT
Start: 2019-03-19 | End: 2019-03-19 | Stop reason: HOSPADM

## 2019-03-19 RX ORDER — ONDANSETRON 2 MG/ML
4 INJECTION INTRAMUSCULAR; INTRAVENOUS EVERY 30 MIN PRN
Status: DISCONTINUED | OUTPATIENT
Start: 2019-03-19 | End: 2019-03-19 | Stop reason: HOSPADM

## 2019-03-19 RX ORDER — NEOSTIGMINE METHYLSULFATE 1 MG/ML
VIAL (ML) INJECTION PRN
Status: DISCONTINUED | OUTPATIENT
Start: 2019-03-19 | End: 2019-03-19

## 2019-03-19 RX ORDER — BUPIVACAINE HYDROCHLORIDE AND EPINEPHRINE 5; 5 MG/ML; UG/ML
INJECTION, SOLUTION PERINEURAL PRN
Status: DISCONTINUED | OUTPATIENT
Start: 2019-03-19 | End: 2019-03-19 | Stop reason: HOSPADM

## 2019-03-19 RX ORDER — KETOROLAC TROMETHAMINE 30 MG/ML
INJECTION, SOLUTION INTRAMUSCULAR; INTRAVENOUS PRN
Status: DISCONTINUED | OUTPATIENT
Start: 2019-03-19 | End: 2019-03-19

## 2019-03-19 RX ADMIN — ONDANSETRON 4 MG: 2 INJECTION INTRAMUSCULAR; INTRAVENOUS at 16:00

## 2019-03-19 RX ADMIN — ROCURONIUM BROMIDE 3 MG: 10 INJECTION INTRAVENOUS at 15:28

## 2019-03-19 RX ADMIN — FENTANYL CITRATE 100 MCG: 50 INJECTION, SOLUTION INTRAMUSCULAR; INTRAVENOUS at 15:30

## 2019-03-19 RX ADMIN — PROPOFOL 300 MG: 10 INJECTION, EMULSION INTRAVENOUS at 15:30

## 2019-03-19 RX ADMIN — DEXAMETHASONE SODIUM PHOSPHATE 4 MG: 4 INJECTION, SOLUTION INTRA-ARTICULAR; INTRALESIONAL; INTRAMUSCULAR; INTRAVENOUS; SOFT TISSUE at 15:32

## 2019-03-19 RX ADMIN — Medication 4 MG: at 16:44

## 2019-03-19 RX ADMIN — SODIUM CHLORIDE, POTASSIUM CHLORIDE, SODIUM LACTATE AND CALCIUM CHLORIDE: 600; 310; 30; 20 INJECTION, SOLUTION INTRAVENOUS at 15:03

## 2019-03-19 RX ADMIN — LIDOCAINE HYDROCHLORIDE 30 MG: 10 INJECTION, SOLUTION INFILTRATION; PERINEURAL at 15:30

## 2019-03-19 RX ADMIN — DEXAMETHASONE SODIUM PHOSPHATE 4 MG: 4 INJECTION, SOLUTION INTRA-ARTICULAR; INTRALESIONAL; INTRAMUSCULAR; INTRAVENOUS; SOFT TISSUE at 15:45

## 2019-03-19 RX ADMIN — ACETAMINOPHEN 975 MG: 325 TABLET, FILM COATED ORAL at 14:56

## 2019-03-19 RX ADMIN — KETOROLAC TROMETHAMINE 30 MG: 30 INJECTION, SOLUTION INTRAMUSCULAR at 16:00

## 2019-03-19 RX ADMIN — Medication 100 MG: at 15:30

## 2019-03-19 RX ADMIN — GLYCOPYRROLATE 0.6 MG: 0.2 INJECTION, SOLUTION INTRAMUSCULAR; INTRAVENOUS at 16:30

## 2019-03-19 RX ADMIN — GLYCOPYRROLATE 0.2 MG: 0.2 INJECTION, SOLUTION INTRAMUSCULAR; INTRAVENOUS at 15:30

## 2019-03-19 RX ADMIN — MIDAZOLAM 2 MG: 1 INJECTION INTRAMUSCULAR; INTRAVENOUS at 15:30

## 2019-03-19 RX ADMIN — ROCURONIUM BROMIDE 22 MG: 10 INJECTION INTRAVENOUS at 15:32

## 2019-03-19 ASSESSMENT — MIFFLIN-ST. JEOR: SCORE: 2146.88

## 2019-03-19 NOTE — DISCHARGE INSTRUCTIONS
GENERAL ANESTHESIA OR SEDATION ADULT DISCHARGE INSTRUCTIONS   SPECIAL PRECAUTIONS FOR 24 HOURS AFTER SURGERY    IT IS NOT UNUSUAL TO FEEL LIGHT-HEADED OR FAINT, UP TO 24 HOURS AFTER SURGERY OR WHILE TAKING PAIN MEDICATION.  IF YOU HAVE THESE SYMPTOMS; SIT FOR A FEW MINUTES BEFORE STANDING AND HAVE SOMEONE ASSIST YOU WHEN YOU GET UP TO WALK OR USE THE BATHROOM.    YOU SHOULD REST AND RELAX FOR THE NEXT 24 HOURS AND YOU MUST MAKE ARRANGEMENTS TO HAVE SOMEONE STAY WITH YOU FOR AT LEAST 24 HOURS AFTER YOUR DISCHARGE.  AVOID HAZARDOUS AND STRENUOUS ACTIVITIES.  DO NOT MAKE IMPORTANT DECISIONS FOR 24 HOURS.    DO NOT DRIVE ANY VEHICLE OR OPERATE MECHANICAL EQUIPMENT FOR 24 HOURS FOLLOWING THE END OF YOUR SURGERY.  EVEN THOUGH YOU MAY FEEL NORMAL, YOUR REACTIONS MAY BE AFFECTED BY THE MEDICATION YOU HAVE RECEIVED.    DO NOT DRINK ALCOHOLIC BEVERAGES FOR 24 HOURS FOLLOWING YOUR SURGERY.    DRINK CLEAR LIQUIDS (APPLE JUICE, GINGER ALE, 7-UP, BROTH, ETC.).  PROGRESS TO YOUR REGULAR DIET AS YOU FEEL ABLE.    YOU MAY HAVE A DRY MOUTH, A SORE THROAT, MUSCLES ACHES OR TROUBLE SLEEPING.  THESE SHOULD GO AWAY AFTER 24 HOURS.    CALL YOUR DOCTOR FOR ANY OF THE FOLLOWING:  SIGNS OF INFECTION (FEVER, GROWING TENDERNESS AT THE SURGERY SITE, A LARGE AMOUNT OF DRAINAGE OR BLEEDING, SEVERE PAIN, FOUL-SMELLING DRAINAGE, REDNESS OR SWELLING.    IT HAS BEEN OVER 8 TO 10 HOURS SINCE SURGERY AND YOU ARE STILL NOT ABLE TO URINATE (PASS WATER).     Anorectal surgery handout given.

## 2019-03-19 NOTE — ANESTHESIA CARE TRANSFER NOTE
Patient: Denny Herrera    Procedure(s):  HEMORRHOIDECTOMY EXTERNAL    Diagnosis: Hemorrhoid  Diagnosis Additional Information: No value filed.    Anesthesia Type:   General, ETT     Note:  Airway :Face Mask  Patient transferred to:PACU  Comments: Pt.to PACU, VSS,report to RNHandoff Report: Identifed the Patient, Identified the Reponsible Provider, Reviewed the pertinent medical history, Discussed the surgical course, Reviewed Intra-OP anesthesia mangement and issues during anesthesia, Set expectations for post-procedure period and Allowed opportunity for questions and acknowledgement of understanding      Vitals: (Last set prior to Anesthesia Care Transfer)    CRNA VITALS  3/19/2019 1608 - 3/19/2019 1645      3/19/2019             Pulse:  126    SpO2:  95 %    Resp Rate (observed):  21                Electronically Signed By: CRISELDA Eng CRNA  March 19, 2019  4:45 PM

## 2019-03-19 NOTE — BRIEF OP NOTE
[facility]    Brief Operative Note    Pre-operative diagnosis: Hemorrhoid  Post-operative diagnosis Thrombosed external hemorrhoid  Procedure: Procedure(s):  HEMORRHOIDECTOMY EXTERNAL  Surgeon: Surgeon(s) and Role:     * Patria Roberson MD - Primary     * Maricruz Reyes PA-C - Assisting      * Fredrick Chavez MD - [facility] Fellow  Anesthesia: General   Estimated blood loss: Less than 10 ml  Drains: None  Specimens:   ID Type Source Tests Collected by Time Destination   A : Hemorrhoids Tissue Hemorrhoid SURGICAL PATHOLOGY EXAM Patria Roberson MD 3/19/2019  4:20 PM      Findings:   Left lateral thrombosed external hemorrhoid.  Complications: None.  Implants: None.

## 2019-03-19 NOTE — ANESTHESIA PREPROCEDURE EVALUATION
Anesthesia Pre-Procedure Evaluation    Patient: Denny Herrera   MRN: [id number] : 1980          Preoperative Diagnosis: Hemorrhoid    Procedure(s):  HEMORRHOIDECTOMY EXTERNAL    Past Medical History:   Diagnosis Date     Ankle joint pain 2012     Blood in semen      CARDIOVASCULAR SCREENING; LDL GOAL LESS THAN 160 2011     Depressive disorder      Elevated blood pressure reading without diagnosis of hypertension      Enthesopathy of ankle/tarsus 2012     Gout      Hepatic steatosis 3/14/2014     Hypertension      Hypertriglyceridaemia      Hypertriglyceridemia 2013     Obesity 2013     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 2 for R knee, 3 L knee, ACL and meniscus arthroscopic procedures x 3, 2 open surgery     COLONOSCOPY N/A 2015    Procedure: COLONOSCOPY;  Surgeon: iTmbo Greenberg MD;  Location:  GI     ORTHOPEDIC SURGERY       OSTEOTOMY TIBIA Right 6/15/2016    Procedure: OSTEOTOMY TIBIA;  Surgeon: Tim Valdez MD;  Location: [facility] OR     REMOVE HARDWARE KNEE Right 6/15/2016    Procedure: REMOVE HARDWARE KNEE;  Surgeon: Tim Valdez MD;  Location: [facility] OR     Anesthesia Evaluation     . Pt has had prior anesthetic.            ROS/MED HX    ENT/Pulmonary:  - neg pulmonary ROS     Neurologic:  - neg neurologic ROS     Cardiovascular:     (+) Dyslipidemia, hypertension----. : . . . :. .       METS/Exercise Tolerance:     Hematologic:   "- neg hematologic  ROS       Musculoskeletal:         GI/Hepatic:     (+) liver disease,       Renal/Genitourinary:     (+) Other Renal/ Genitourinary, gout      Endo:     (+) type II DM Last HgA1c: preDM Obesity, .      Psychiatric:     (+) psychiatric history depression      Infectious Disease:  - neg infectious disease ROS       Malignancy:      - no malignancy   Other:                          Physical Exam  Normal systems: cardiovascular and pulmonary    Airway   Mallampati: II  TM distance: >3 FB  Neck ROM: full    Dental     Cardiovascular       Pulmonary             Lab Results   Component Value Date    WBC 6.3 02/21/2019    HGB 13.7 02/21/2019    HCT 39.0 (L) 02/21/2019     02/21/2019    CRP 6.6 03/08/2013    SED 8 03/08/2013     02/21/2019    POTASSIUM 4.1 02/21/2019    CHLORIDE 108 02/21/2019    CO2 28 02/21/2019    BUN 16 02/21/2019    CR 0.94 02/21/2019     (H) 02/21/2019    UMA 9.0 02/21/2019    MAG 2.2 03/13/2012    ALBUMIN 4.4 02/21/2019    PROTTOTAL 8.1 02/21/2019    ALT 94 (H) 02/21/2019    AST 57 (H) 02/21/2019    ALKPHOS 25 (L) 02/21/2019    BILITOTAL 0.5 02/21/2019    LIPASE 172 08/17/2014    TSH 1.28 08/11/2014       Preop Vitals  BP Readings from Last 3 Encounters:   02/21/19 134/80   01/08/19 120/80   01/05/19 144/84    Pulse Readings from Last 3 Encounters:   02/21/19 64   01/08/19 85   01/05/19 107      Resp Readings from Last 3 Encounters:   10/25/17 16   07/27/17 12   06/17/16 16    SpO2 Readings from Last 3 Encounters:   02/21/19 97%   01/08/19 95%   01/05/19 97%      Temp Readings from Last 1 Encounters:   02/21/19 98.2  F (36.8  C) (Tympanic)    Ht Readings from Last 1 Encounters:   02/21/19 1.842 m (6' 0.5\")      Wt Readings from Last 1 Encounters:   02/21/19 119.6 kg (263 lb 11.2 oz)    Estimated body mass index is 35.27 kg/m  as calculated from the following:    Height as of 2/21/19: 1.842 m (6' 0.5\").    Weight as of 2/21/19: 119.6 kg (263 lb 11.2 oz). "       Anesthesia Plan      History & Physical Review  History and physical reviewed and following examination; no interval change.    ASA Status:  2 .    NPO Status:  > 8 hours    Plan for General and ETT with Intravenous and Propofol induction. Maintenance will be Balanced.    PONV prophylaxis:  Ondansetron (or other 5HT-3) and Dexamethasone or Solumedrol       Postoperative Care  Postoperative pain management:  IV analgesics.      Consents  Anesthetic plan, risks, benefits and alternatives discussed with:  Patient.  Use of blood products discussed: Yes.   Use of blood products discussed with Patient.  Consented to blood products.  .                 Denis Starr MD                    .

## 2019-03-20 NOTE — OP NOTE
Procedure Date: 03/19/2019      PREOPERATIVE DIAGNOSIS:  Large thrombosed external hemorrhoid.      POSTOPERATIVE DIAGNOSIS:  Large thrombosed external hemorrhoid.      PROCEDURES:  Left lateral hemorrhoidectomy and excision of thrombosed hemorrhoid.      SURGEON:  Patria Roberson MD      ASSISTANTS:  Hira Chavez MD, [facility] fellow and Maricruz Reyes PA-C      INDICATIONS:  Denny is a 38-year-old man who has had prior TH over the past 10 years or so.  Two days ago, he began having a painful large lump that progressed.  He was seen in the office yesterday and diagnosed with a large external hemorrhoid.  He declined to have any office-based procedure for that.  He does note he has a bowel movement at least once a day and tries not to push and strain.  He takes Metamucil a few times a week and has had a colonoscopy in 2015 that was normal for some bleeding.  He does continue to have some blood with his bowel movements near the end and is planning to get that worked up.      In the office yesterday with my partner, he was briefed on the risks of bleeding, infection, alteration of bowel control, need for further procedures and expected recovery.  I also had an opportunity to meet him in the preoperative area and review the same issues.  He understood and wished to proceed.      FINDINGS:  There was a roughly 3 cm jeana-circumferential group of thrombosis on the left lateral aspect and a small internal component.  The bulk of this was excised and the flaps were undermined on each side to remove the clots and a small portion of the internal hemorrhoid was removed with this.  There were moderately inflamed hemorrhoids on the right side; these were left in place as they were not thrombosed.      DESCRIPTION OF PROCEDURE:  After informed consent was obtained, the patient was taken to the operating room.  He was intubated on the gurney and positioned on the operating room table in the  prone jackknife position with his pressure points padded.  Perianal region was taped, prepped and draped in the usual fashion.  After appropriate timeout, I began by injecting 30 mL of 0.5% Marcaine with 1:200,000 epinephrine.  Digital rectal exam did not reveal any evidence of stenosis or masses.  Villaseñor bivalve revealed the small component of internal thrombosis on the left lateral aspect.  Otherwise, there were mildly engorged hemorrhoids circumferentially.  The large external hemorrhoid was then placed in the center portion of the Fansler proctoscope and an ellipse of the skin overlying the bulk of the clot was excised and dissection was carried down to the apex.  The clots were lifted up off of the hemorrhoid complex and the apex was clamped with a clamp.  This was secured with a suture ligature.  We then elevated the flaps on each side, removing the small remaining clots and allowing for a flat lie of the perianal skin without removing too much.  We then used a running locking stitch on the mucosa and continuous of the same running stitch of 3-0 Vicryl to reapproximate the skin.  At the completion of this, we had nice hemostasis and a flat lie of the perianal skin on the left side.  A dry gauze dressing was placed externally.  He was returned to the [facility] and taken to recovery in stable condition.  Sponge and needle counts were correct at the end of the case.      ESTIMATED BLOOD LOSS:  10 mL.      COMPLICATIONS:  No immediate complications.      SPECIMENS:  The hemorrhoid.         ROLAND LANZA MD             D: 2019   T: 2019   MT: JESSICA      Name:     KRISS ESPAÑA   MRN:      [id number]        Account:        [id number]   :      1980           Procedure Date: 2019      Document: [id number]       cc: Gali Lanza MD

## 2019-03-20 NOTE — ANESTHESIA POSTPROCEDURE EVALUATION
Patient: Denny Herrera    Procedure(s):  External hemorrhoidectomy    Diagnosis:Hemorrhoid  Diagnosis Additional Information: Thrombosed external hemorrhoid    Anesthesia Type:  General, ETT    Note:  Anesthesia Post Evaluation    Patient location during evaluation: PACU  Patient participation: Able to fully participate in evaluation  Level of consciousness: awake  Pain management: adequate  Airway patency: patent  Cardiovascular status: acceptable  Respiratory status: acceptable  Hydration status: acceptable  PONV: controlled     Anesthetic complications: None          Last vitals:  Vitals:    03/19/19 1745 03/19/19 1800 03/19/19 1843   BP: 121/82 123/86 122/81   Pulse: 72 75    Resp: 12  16   Temp:      SpO2: 100% 99% 99%         Electronically Signed By: Edgar Childers DO  March 20, 2019  7:46 AM

## 2019-03-21 ENCOUNTER — TRANSFERRED RECORDS (OUTPATIENT)
Dept: HEALTH INFORMATION MANAGEMENT | Facility: CLINIC | Age: 39
End: 2019-03-21

## 2019-03-21 LAB — COPATH REPORT: NORMAL

## 2019-04-16 ENCOUNTER — MYC MEDICAL ADVICE (OUTPATIENT)
Dept: PEDIATRICS | Facility: CLINIC | Age: 39
End: 2019-04-16

## 2019-04-16 NOTE — TELEPHONE ENCOUNTER
Ok for letter. I can do the handicap parking tomorrow    Thanks,  Gali Del Valle MD  Internal Medicine/Pediatrics

## 2019-04-16 NOTE — TELEPHONE ENCOUNTER
Dr. Del Valle-does handicapped parking form need to be done by provider who is seeing him?  Letter pended for elevator use.    Please review.  ABDON Low RN

## 2019-04-16 NOTE — LETTER
[address] [address]  [phone]      April 16, 2019    Denny Herrera                                                                                                                                                       [id number] [place] [id number]        To whom it may concern,    Due to recent injury, please allow Denny to use the elevators at work until his injury has healed.    Sincerely,      Gali Del Valle MD

## 2019-04-24 ENCOUNTER — TRANSFERRED RECORDS (OUTPATIENT)
Dept: HEALTH INFORMATION MANAGEMENT | Facility: CLINIC | Age: 39
End: 2019-04-24

## 2019-06-27 ENCOUNTER — OFFICE VISIT (OUTPATIENT)
Dept: URGENT CARE | Facility: URGENT CARE | Age: 39
End: 2019-06-27
Payer: COMMERCIAL

## 2019-06-27 VITALS
OXYGEN SATURATION: 97 % | SYSTOLIC BLOOD PRESSURE: 130 MMHG | HEART RATE: 70 BPM | TEMPERATURE: 98.6 F | WEIGHT: 259 LBS | DIASTOLIC BLOOD PRESSURE: 84 MMHG | RESPIRATION RATE: 12 BRPM | BODY MASS INDEX: 34.17 KG/M2

## 2019-06-27 DIAGNOSIS — J02.9 VIRAL PHARYNGITIS: Primary | ICD-10-CM

## 2019-06-27 DIAGNOSIS — J02.9 ACUTE PHARYNGITIS, UNSPECIFIED ETIOLOGY: ICD-10-CM

## 2019-06-27 LAB
DEPRECATED S PYO AG THROAT QL EIA: NORMAL
SPECIMEN SOURCE: NORMAL

## 2019-06-27 PROCEDURE — 87081 CULTURE SCREEN ONLY: CPT | Performed by: FAMILY MEDICINE

## 2019-06-27 PROCEDURE — 99213 OFFICE O/P EST LOW 20 MIN: CPT | Performed by: FAMILY MEDICINE

## 2019-06-27 PROCEDURE — 87880 STREP A ASSAY W/OPTIC: CPT | Performed by: PHYSICIAN ASSISTANT

## 2019-06-27 RX ORDER — PREDNISONE 10 MG/1
10 TABLET ORAL DAILY
Qty: 2 TABLET | Refills: 0 | Status: SHIPPED | OUTPATIENT
Start: 2019-06-27 | End: 2019-07-16

## 2019-06-27 NOTE — PROGRESS NOTES
Subjective:   Denny Herrera is a 38 year old male who presents for   Chief Complaint   Patient presents with     Urgent Care     Pharyngitis     Sore throat and h/a x 1-2 days.  Glands on right side of throat tender to touch since yesterday.  No fever.      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 Having headache with sore throat at this time. No fevers. Feeling run down. No congestion or runny nose. Took a nap and it was very discomforting. No vomiting/diarrhea. No rash of the body. Slight neck stiffness but able to move in flexion/extension without difficulty  Sore throat he rates at 5/10.   Meds attempted include: acetaminophen      Patient Active Problem List    Diagnosis Date Noted     Prediabetes 02/21/2019     Priority: Medium     Chronic gout without tophus, unspecified cause, unspecified site 02/21/2019     Priority: Medium     Mild major depression (H) 09/10/2018     Priority: Medium     Family history of premature coronary heart disease 05/08/2018     Priority: Medium     Obesity (H) 03/05/2018     Priority: Medium     Benign essential hypertension 03/05/2018     Priority: Medium     Acute pain of right knee 06/21/2016     Priority: Medium     Knee pain 06/15/2016     Priority: Medium     Liver lesion - needs f/u 2/2015 08/19/2014     Priority: Medium     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 No evidence of sclerosis on exam.       Raynaud's syndrome 03/14/2014     Priority: Medium     Hepatic steatosis 03/14/2014     Priority: Medium     Hypertriglyceridemia 02/08/2013     Priority: Medium     CARDIOVASCULAR SCREENING; LDL GOAL LESS THAN 160 06/20/2011     Priority: Medium       Current Outpatient Medications   Medication     allopurinol (ZYLOPRIM) 300 MG tablet     buPROPion (WELLBUTRIN XL) 150 MG 24 hr tablet     fenofibrate (TRIGLIDE/LOFIBRA) 160 MG tablet     lisinopril (PRINIVIL/ZESTRIL) 10 MG tablet     multivitamin, therapeutic with minerals  (MULTI-VITAMIN) TABS     Omega-3 Fatty Acids (OMEGA-3 FISH OIL PO)     polyethylene glycol (MIRALAX) powder     VITAMIN D, CHOLECALCIFEROL, PO     No current facility-administered medications for this visit.        ROS:  As above per HPI    Objective:   /84   Pulse 70   Temp 98.6  F (37  C) (Oral)   Resp 12   Wt 117.5 kg (259 lb)   SpO2 97%   BMI 34.17 kg/m  , Body mass index is 34.17 kg/m .  Gen:  NAD, well-nourished, sitting in chair comfortably  HEENT: EOMI, sclera anicteric, Head normocephalic, ; nares patent; moist mucous membranes, tonsil left side 3+, right tonsil 1+, no exudates or tonsil stones, no trismus  Neck: trachea midline, no thyromegaly  CV:  Hemodynamically stable, RRR  Pulm:  no increased work of breathing  Extrem: no cyanosis, edema or clubbing  Skin: no obvious rashes or abnormalities  Psych: Euthymic, linear thoughts, normal rate of speech    Results for orders placed or performed in visit on 06/27/19   Strep, Rapid Screen   Result Value Ref Range    Specimen Description Throat     Rapid Strep A Screen       NEGATIVE: No Group A streptococcal antigen detected by immunoassay, await culture report.     Assessment & Plan:   Denny Herrera, 38 year old male who presents with:    Acute pharyngitis, unspecified etiology  Likely viral illness, since age 16 has had an isolated left sided enlarged tonsil which is consistent with examination today. Negative strep test. Prednisone 10mg a day for 2 days given to help with discomfort. Ibuprofen/tylenol recommended for pain.   - Strep, Rapid Screen  - Beta strep group A culture      Gopal Harden MD   [place] [facility]    The use of Dragon/Spark[employer] dictation services may have been used to construct the content in this note; any grammatical or spelling errors are non-intentional. Please contact the author of this note directly if you are in need of any clarification.

## 2019-06-27 NOTE — PATIENT INSTRUCTIONS
Prednisone 10mg a day for 2 days       Ibuprofen and or tylenol for discomfort    If cough gets worse take robitussin/delsym, okay to take with honey lozenges/supplements      Expect symptoms to improve over the next 5-7 days      Return if having worsening symptoms (throat pain, difficulty breathing, fever)

## 2019-06-27 NOTE — NURSING NOTE
"Chief Complaint   Patient presents with     Urgent Care     Pharyngitis     Sore throat and h/a x 1-2 days.  Glands on right side of throat tender to touch since yesterday.  No fever.      Initial /84   Pulse 70   Temp 98.6  F (37  C) (Oral)   Resp 12   Wt 117.5 kg (259 lb)   SpO2 97%   BMI 34.17 kg/m   Estimated body mass index is 34.17 kg/m  as calculated from the following:    Height as of 3/19/19: 1.854 m (6' 1\").    Weight as of this encounter: 117.5 kg (259 lb)..  BP completed using cuff size: jasmin Orta R.N.  Pt states he is here to get a strep test.    "

## 2019-06-28 LAB
BACTERIA SPEC CULT: NORMAL
SPECIMEN SOURCE: NORMAL

## 2019-07-07 DIAGNOSIS — E78.1 HYPERTRIGLYCERIDEMIA: ICD-10-CM

## 2019-07-07 DIAGNOSIS — F32.0 MILD MAJOR DEPRESSION (H): ICD-10-CM

## 2019-07-07 DIAGNOSIS — F90.0 ATTENTION DEFICIT HYPERACTIVITY DISORDER (ADHD), PREDOMINANTLY INATTENTIVE TYPE: ICD-10-CM

## 2019-07-07 NOTE — TELEPHONE ENCOUNTER
"Requested Prescriptions   Pending Prescriptions Disp Refills     buPROPion (WELLBUTRIN XL) 150 MG 24 hr tablet [Pharmacy Med Name: BUPROPION HCL XL TABS 150MG] 90 tablet 0     Sig: TAKE 1 TABLET EVERY MORNING  Last Written Prescription Date:  02/21/2019  Last Fill Quantity: 90 tablet,  # refills: 0   Last office visit: 2/21/2019 with prescribing provider:  Gali Del Valle MD    Future Office Visit:           SSRIs Protocol Passed - 7/7/2019  3:42 PM        Passed - PHQ-9 score less than 5 in past 6 months     Please review last PHQ-9 score.   PHQ-9 SCORE 9/10/2018 10/3/2018 2/21/2019   PHQ-9 Total Score MyChart - - -   PHQ-9 Total Score 15 2 2     JAMIE-7 SCORE 8/15/2018 10/3/2018 2/21/2019   Total Score - - -   Total Score 3 2 0                 Passed - Medication is Bupropion     If the medication is Bupropion (Wellbutrin), and the patient is taking for smoking cessation; OK to refill.          Passed - Medication is active on med list        Passed - Patient is age 18 or older        Passed - Recent (6 mo) or future (30 days) visit within the authorizing provider's specialty     Patient had office visit in the last 6 months or has a visit in the next 30 days with authorizing provider or within the authorizing provider's specialty.  See \"Patient Info\" tab in inbasket, or \"Choose Columns\" in Meds & Orders section of the refill encounter.            fenofibrate (TRIGLIDE/LOFIBRA) 160 MG tablet [Pharmacy Med Name: FENOFIBRATE TABS 160MG] 90 tablet 0     Sig: TAKE 1 TABLET DAILY (NEED OFFICE VISIT AND FASTING LABS FOR FURTHER REFILLS)  Last Written Prescription Date:  02/21/2019  Last Fill Quantity: 90 tablet,  # refills: 0   Last office visit: 2/21/2019 with prescribing provider:  Gali Del Valle MD    Future Office Visit:           Fibrates Passed - 7/7/2019  3:42 PM        Passed - Lipid panel on file in past 12 months     Recent Labs   Lab Test 02/21/19  0929  06/13/14  0912   CHOL 184   < > 148 " "  TRIG 204*   < > 314*   HDL 28*   < > 24*   *   < > 61   NHDL 156*   < >  --    VLDL  --   --  63*   CHOLHDLRATIO  --   --  6.1*    < > = values in this interval not displayed.               Passed - No abnormal creatine kinase in past 12 months     Recent Labs   Lab Test 03/15/13  1522                   Passed - Recent (12 mo) or future (30 days) visit within the authorizing provider's specialty     Patient had office visit in the last 12 months or has a visit in the next 30 days with authorizing provider or within the authorizing provider's specialty.  See \"Patient Info\" tab in inbasket, or \"Choose Columns\" in Meds & Orders section of the refill encounter.              Passed - Medication is active on med list        Passed - Patient is age 18 or older          "

## 2019-07-08 RX ORDER — BUPROPION HYDROCHLORIDE 150 MG/1
150 TABLET ORAL EVERY MORNING
Qty: 90 TABLET | Refills: 0 | Status: SHIPPED | OUTPATIENT
Start: 2019-07-08 | End: 2019-12-27

## 2019-07-08 RX ORDER — FENOFIBRATE 160 MG/1
TABLET ORAL
Qty: 90 TABLET | Refills: 0 | OUTPATIENT
Start: 2019-07-08

## 2019-07-08 RX ORDER — FENOFIBRATE 160 MG/1
TABLET ORAL
Qty: 90 TABLET | Refills: 0 | Status: SHIPPED | OUTPATIENT
Start: 2019-07-08 | End: 2019-12-27

## 2019-07-08 RX ORDER — BUPROPION HYDROCHLORIDE 150 MG/1
TABLET ORAL
Qty: 90 TABLET | Refills: 0 | OUTPATIENT
Start: 2019-07-08

## 2019-07-15 ENCOUNTER — NURSE TRIAGE (OUTPATIENT)
Dept: PEDIATRICS | Facility: CLINIC | Age: 39
End: 2019-07-15

## 2019-07-15 NOTE — TELEPHONE ENCOUNTER
"Informed pt to schedule appt to be seen, due to persisting sore throat.      Additional Information    Negative: Severe difficulty breathing (e.g., struggling for each breath, speaks in single words)    Negative: Sounds like a life-threatening emergency to the triager    Negative: Throat culture results, call about    Negative: Productive cough is the main symptom    Negative: Runny nose is the main symptom    Negative: Difficulty breathing (per caller) but not severe    Negative: Fever > 103 F (39.4 C)    Negative: Refuses to drink anything for > 12 hours    Negative: Drooling or spitting out saliva (because can't swallow)    Negative: Unable to open mouth completely    Negative: Drinking very little and has signs of dehydration (e.g., no urine > 12 hours, very dry mouth, very lightheaded)    Negative: Patient sounds very sick or weak to the triager    Negative: Fever present > 3 days (72 hours)    Negative: SEVERE sore throat pain    Negative: Pus on tonsils (back of throat) and swollen neck lymph nodes ('glands')    Negative: Earache also present    Negative: Widespread rash (especially chest and abdomen)    Negative: Diabetes mellitus or weak immune system (e.g., HIV positive, cancer chemo, splenectomy, organ transplant, chronic steroids)    Negative: History of rheumatic fever    Negative: Patient wants to be seen    Negative: Patient requesting a strep throat test    Negative: Strep exposure within last 10 days    Negative: Sore throat is the main symptom and persists > 48 hours    Negative: Sore throat with cough/cold symptoms present > 5 days    Sore throat    Answer Assessment - Initial Assessment Questions  1. ONSET: \"When did the throat start hurting?\" (Hours or days ago)       2 and a half weeks ago  2. SEVERITY: \"How bad is the sore throat?\" (Scale 1-10; mild, moderate or severe)    - MILD (1-3):  doesn't interfere with eating or normal activities    - MODERATE (4-7): interferes with eating some solids " "and normal activities    - SEVERE (8-10):  excruciating pain, interferes with most normal activities    - SEVERE DYSPHAGIA: can't swallow liquids, drooling      3/10  3. STREP EXPOSURE: \"Has there been any exposure to strep within the past week?\" If so, ask: \"What type of contact occurred?\"       none  4.  VIRAL SYMPTOMS: \"Are there any symptoms of a cold, such as a runny nose, cough, hoarse voice or red eyes?\"       no  5. FEVER: \"Do you have a fever?\" If so, ask: \"What is your temperature, how was it measured, and when did it start?\"      no  6. PUS ON THE TONSILS: \"Is there pus on the tonsils in the back of your throat?\"      on  7. OTHER SYMPTOMS: \"Do you have any other symptoms?\" (e.g., difficulty breathing, headache, rash)      no  8. PREGNANCY: \"Is there any chance you are pregnant?\" \"When was your last menstrual period?\"      no    Protocols used: SORE THROAT-A-OH    Catrina Bates RN - Triage  [facility]    "

## 2019-07-15 NOTE — TELEPHONE ENCOUNTER
Reason for call:  Symptoms  Patient called regarding (reason for call): call back  Additional comments: Call patient back regarding symptoms. He would like to talk to the nurse.    Phone number to reach patient:  Home number on file [phone] (home)    Best Time:  ASAP    Can we leave a detailed message on this number?  YES

## 2019-07-16 ENCOUNTER — OFFICE VISIT (OUTPATIENT)
Dept: PEDIATRICS | Facility: CLINIC | Age: 39
End: 2019-07-16
Payer: COMMERCIAL

## 2019-07-16 VITALS
TEMPERATURE: 98.2 F | OXYGEN SATURATION: 95 % | HEART RATE: 76 BPM | DIASTOLIC BLOOD PRESSURE: 86 MMHG | HEIGHT: 73 IN | WEIGHT: 263 LBS | BODY MASS INDEX: 34.85 KG/M2 | SYSTOLIC BLOOD PRESSURE: 130 MMHG

## 2019-07-16 DIAGNOSIS — J02.9 SORE THROAT: Primary | ICD-10-CM

## 2019-07-16 LAB
DEPRECATED S PYO AG THROAT QL EIA: NORMAL
HETEROPH AB SER QL: NEGATIVE
SPECIMEN SOURCE: NORMAL

## 2019-07-16 PROCEDURE — 36415 COLL VENOUS BLD VENIPUNCTURE: CPT | Performed by: NURSE PRACTITIONER

## 2019-07-16 PROCEDURE — 87081 CULTURE SCREEN ONLY: CPT | Performed by: NURSE PRACTITIONER

## 2019-07-16 PROCEDURE — 86308 HETEROPHILE ANTIBODY SCREEN: CPT | Performed by: NURSE PRACTITIONER

## 2019-07-16 PROCEDURE — 87880 STREP A ASSAY W/OPTIC: CPT | Performed by: NURSE PRACTITIONER

## 2019-07-16 PROCEDURE — 99213 OFFICE O/P EST LOW 20 MIN: CPT | Performed by: NURSE PRACTITIONER

## 2019-07-16 ASSESSMENT — MIFFLIN-ST. JEOR: SCORE: 2143.9

## 2019-07-16 NOTE — PROGRESS NOTES
"Subjective     Denny Herrera is a 39 year old male who presents to clinic today for the following health issues:    HPI   Acute Illness   Acute illness concerns: Sore Throat   Onset: 2.5 weeks    Fever: no    Chills/Sweats: no     Headache (location?): frontal    Sinus Pressure:no    Conjunctivitis:  no    Ear Pain: no    Rhinorrhea: no    Congestion: no    Sore Throat: YES     Cough: YES-non-productive but not new -seems to be from lisinopril    Wheeze: no     Decreased Appetite: no    Nausea: no    Vomiting: no    Diarrhea:  no    Dysuria/Freq.: no    Fatigue/Achiness: YES, tired.    Sick/Strep Exposure: no      Therapies Tried and outcome: robitussin doesn't help, salt water helps.    Seen 6/27 with same complaint, had negative strep.  Has 3 children, no known sick exposures.  Sore throat not worsening but not improving. 2/10 but with swallowing increases pain to 5/10.       Reviewed and updated as needed this visit by Provider  Meds  Problems         Review of Systems   ROS COMP: Constitutional, HEENT, cardiovascular, pulmonary, gi and gu systems are negative, except as otherwise noted.      Objective    /86 (BP Location: Right arm, Patient Position: Chair, Cuff Size: Adult Large)   Pulse 76   Temp 98.2  F (36.8  C) (Oral)   Ht 1.842 m (6' 0.5\")   Wt 119.3 kg (263 lb)   SpO2 95%   BMI 35.18 kg/m    Body mass index is 35.18 kg/m .  Physical Exam   GENERAL: healthy, alert and no distress  EYES: Eyes grossly normal to inspection  HENT: ear canals and TM's normal, nose without ulcers or lesions, oropharynx reddened, left tonsil +2, right tonsil +1 but no exudate or abscess noted-Reports left tonsil always bigger than right, consistent with previous exam findings.  NECK: b/l submandibular adenopathy  RESP: lungs clear to auscultation - no rales, rhonchi or wheezes  CV: regular rate and rhythm, normal S1 S2, no S3 or S4, no murmur, click or rub  SKIN: no suspicious lesions or rashes    Diagnostic Test " Results:  Labs reviewed in Epic  Strep screen - Negative  Mono -  Negative        Assessment & Plan   1. Sore throat  Suspect ongoing viral illness. Discussed symptomatic cares, may continue OTC tylenol and salt water gargles. Push fluids. Strep culture pending.  - Rapid strep screen  - Mononucleosis screen  - Beta strep group A culture      See Patient Instructions    Return in about 1 week (around 7/23/2019) for Follow-up if symptoms do not improve or worsen.    Krysta Garcia, MARKOS  [facility]

## 2019-07-16 NOTE — PATIENT INSTRUCTIONS
I suspect this is ongoing viral illness.  Continue to drink plenty of fluids.Patient Education     When You Have a Sore Throat    A sore throat can be painful. There are many reasons why you may have a sore throat. Your healthcare provider will work with you to find the cause of your sore throat. He or she will also find the best treatment for you.  What causes a sore throat?  Sore throats can be caused or worsened by:    Cold or flu viruses    Bacteria    Irritants such as tobacco smoke or air pollution    Acid reflux  A healthy throat  The tonsils are on the sides of the throat near the base of the tongue. They collect viruses and bacteria and help fight infection. The throat (pharynx) is the passage for air. Mucus from the nasal cavity also moves down the passage.  An inflamed throat  The tonsils and pharynx can become inflamed due to a cold or flu virus. Postnasal drip (excess mucus draining from the nasal cavity) can irritate the throat. It can also make the throat or tonsils more likely to be infected by bacteria. Severe, untreated tonsillitis in children or adults can cause a pocket of pus (abscess) to form near the tonsil.  Your evaluation  A medical evaluation can help find the cause of your sore throat. It can also help your healthcare provider choose the best treatment for you. The evaluation may include a health history, physical exam, and diagnostic tests.  Health history  Your healthcare provider may ask you the following:    How long has the sore throat lasted and how have you been treating it?    Do you have any other symptoms, such as body aches, fever, or cough?    Does your sore throat recur? If so, how often? How many days of school or work have you missed because of a sore throat?    Do you have trouble eating or swallowing?    Have you been told that you snore or have other sleep problems?    Do you have bad breath?    Do you cough up bad-tasting mucus?  Physical exam  During the exam, your  "healthcare provider checks your ears, nose, and throat for problems. He or she also checks for swelling in the neck, and may listen to your chest.  Possible tests  Other tests your healthcare provider may perform include:    A throat swab to check for bacteria such as streptococcus (the bacteria that causes strep throat)    A blood test to check for mononucleosis (a viral infection)    A chest X-ray to rule out pneumonia, especially if you have a cough  Treating a sore throat  Treatment depends on many factors. What is the likely cause? Is the problem recent? Does it keep coming back? In many cases, the best thing to do is to treat the symptoms, rest, and let the problem heal itself. Antibiotics may help clear up some bacterial infections. For cases of severe or recurring tonsillitis, the tonsils may need to be removed.  Relieving your symptoms    Don t smoke, and avoid secondhand smoke.    For children, try throat sprays or Popsicles. Adults and older children may try lozenges.    Drink warm liquids to soothe the throat and help thin mucus. Avoid alcohol, spicy foods, and acidic drinks such as orange juice. These can irritate the throat.    Gargle with warm saltwater (1 teaspoon of salt to 8 ounces of warm water).    Use a humidifier to keep air moist and relieve throat dryness.    Try over-the-counter pain relievers such as acetaminophen or ibuprofen. Use as directed, and don t exceed the recommended dose. Don t give aspirin to children.   Are antibiotics needed?  If your sore throat is due to a bacterial infection, antibiotics may speed healing and prevent complications. Although group A streptococcus (\"strep throat\" or GAS) is the major treatable infection for a sore throat, GAS causes only 5% to 15% of sore throats in adults who seek medical care. Most sore throats are caused by cold or flu viruses. And antibiotics don t treat viral illness. In fact, using antibiotics when they re not needed may produce " bacteria that are harder to kill. Your healthcare provider will prescribe antibiotics only if he or she thinks they are likely to help.  If antibiotics are prescribed  Take the medicine exactly as directed. Be sure to finish your prescription even if you re feeling better. And be sure to ask your healthcare provider or pharmacist what side effects are common and what to do about them.  Is surgery needed?  In some cases, tonsils need to be removed. This is often done as outpatient (same-day) surgery. Your healthcare provider may advise removing the tonsils in cases of:    Several severe bouts of tonsillitis in a year.  Severe  episodes include those that lead to missed days of school or work, or that need to be treated with antibiotics.    Tonsillitis that causes breathing problems during sleep    Tonsillitis caused by food particles collecting in pouches in the tonsils (cryptic tonsillitis)  Call your healthcare provider if any of the following occur:    Symptoms worsen, or new symptoms develop.    Swollen tonsils make breathing difficult.    The pain is severe enough to keep you from drinking liquids.    A skin rash, hives, or wheezing develops. Any of these could signal an allergic reaction to antibiotics.    Symptoms don t improve within a week.    Symptoms don t improve within 2 to 3 days of starting antibiotics.   Date Last Reviewed: 10/1/2016    0355-7826 [employer]. [address] [place] [address], [place] [address]. All rights reserved. This information is not intended as a substitute for professional medical care. Always follow your healthcare professional's instructions.

## 2019-07-17 LAB
BACTERIA SPEC CULT: NORMAL
SPECIMEN SOURCE: NORMAL

## 2019-11-05 ENCOUNTER — HEALTH MAINTENANCE LETTER (OUTPATIENT)
Age: 39
End: 2019-11-05

## 2019-12-17 ENCOUNTER — TRANSFERRED RECORDS (OUTPATIENT)
Dept: HEALTH INFORMATION MANAGEMENT | Facility: CLINIC | Age: 39
End: 2019-12-17

## 2019-12-26 DIAGNOSIS — E78.1 HYPERTRIGLYCERIDEMIA: ICD-10-CM

## 2019-12-26 DIAGNOSIS — F32.0 MILD MAJOR DEPRESSION (H): ICD-10-CM

## 2019-12-26 DIAGNOSIS — F90.0 ATTENTION DEFICIT HYPERACTIVITY DISORDER (ADHD), PREDOMINANTLY INATTENTIVE TYPE: ICD-10-CM

## 2019-12-27 RX ORDER — BUPROPION HYDROCHLORIDE 150 MG/1
TABLET ORAL
Qty: 90 TABLET | Refills: 0 | Status: SHIPPED | OUTPATIENT
Start: 2019-12-27 | End: 2020-07-02

## 2019-12-27 RX ORDER — FENOFIBRATE 160 MG/1
TABLET ORAL
Qty: 90 TABLET | Refills: 0 | Status: SHIPPED | OUTPATIENT
Start: 2019-12-27 | End: 2020-04-09

## 2019-12-27 NOTE — TELEPHONE ENCOUNTER
Prescription approved per [facility]. Mami Cleveland RN December 27, 2019 11:33 AM

## 2020-01-14 ENCOUNTER — TRANSFERRED RECORDS (OUTPATIENT)
Dept: HEALTH INFORMATION MANAGEMENT | Facility: CLINIC | Age: 40
End: 2020-01-14

## 2020-01-18 ENCOUNTER — TRANSFERRED RECORDS (OUTPATIENT)
Dept: HEALTH INFORMATION MANAGEMENT | Facility: CLINIC | Age: 40
End: 2020-01-18

## 2020-01-18 ENCOUNTER — NURSE TRIAGE (OUTPATIENT)
Dept: NURSING | Facility: CLINIC | Age: 40
End: 2020-01-18

## 2020-01-18 LAB
ALT SERPL-CCNC: 43 U/L (ref 13–61)
AST SERPL-CCNC: 33 U/L (ref 15–37)
CREAT SERPL-MCNC: 1.1 MG/DL (ref 0.7–1.2)
GFR SERPL CREATININE-BSD FRML MDRD: >60 ML/MIN/1.73M2
GLUCOSE SERPL-MCNC: 94 MG/DL (ref 74–106)
POTASSIUM SERPL-SCNC: 3.9 MMOL/L (ref 3.5–5)

## 2020-01-18 NOTE — TELEPHONE ENCOUNTER
[facility].  Patient had an infection in the incisional area (abdomen and back) and was started on an antibiotic at 2pm yesterday.   Current temp is 100 F (tymp).  Reviewed care advice with caller per RN triage protocol.  FNA advised to call back with any concerns.   Caller verbalized understanding.      Additional Information    Negative: Severe difficulty breathing (e.g., struggling for each breath, speaks in single words)    Negative: Sounds like a life-threatening emergency to the triager    Negative: [1] Recent hospitalization for pneumonia AND [2] taking an antibiotic    Negative: [1] Animal bite infection AND [2] taking an antibiotic    Negative: [1] Cellulitis AND [2] taking an antibiotic    Negative: [1] Ear  infection (Otitis Media) AND [2] taking an antibiotic    Negative: [1] Ear  infection (Swimmer's Ear) AND [2] taking an antibiotic    Negative: [1] Sinus infection AND [2] taking an antibiotic    Negative: [1] Strep throat AND [2] taking an antibiotic    Negative: [1] Urinary tract  infection (e.g., cystitis, pyelonephritis, urethritis, epididymitis) AND [2] male AND [3] taking an antibiotic    Negative: [1] Urinary tract  infection (e.g., cystitis, pyelonephritis, urethritis) AND [2] female AND [3] taking an antibiotic    Negative: [1] Wound infection AND [2] taking an antibiotic    Negative: MODERATE difficulty breathing (e.g., speaks in phrases, SOB even at rest, pulse 100-120)    Negative: Fever > 103 F (39.4 C)    Negative: Patient sounds very sick or weak to the triager    Negative: [1] Taking antibiotics > 24 hours AND [2] symptoms WORSE    Negative: [1] Recent hospitalization AND [2] symptoms WORSE    Negative: [1] Diabetes mellitus or weak immune system (e.g., HIV positive, cancer chemo, splenectomy, organ transplant, chronic steroids) AND [2] new onset of fever > 100.0 F (37.8 C)    Negative: [1] Caller has URGENT question AND [2] triager unable to  answer question    Negative: [1] Taking antibiotic AND [2] new onset of fever    Negative: [1] Taking antibiotic > 48 hours (2 days) AND [2] fever still present (SAME)    Negative: [1] Taking antibiotic > 72 hours (3 days) AND [2] symptoms (other than fever) not improved    Negative: [1] Caller has NON-URGENT question AND [2] triager unable to answer question    Negative: [1] Finished taking antibiotics AND [2] symptoms are BETTER but [3] not completely gone    Negative: [1] Taking antibiotic AND [2] symptoms are BETTER AND [3] no fever    [1] Taking antibiotics < 48 hours AND [2] fever still present (SAME)    Protocols used: INFECTION ON ANTIBIOTIC FOLLOW-UP CALL-A-

## 2020-02-03 LAB
ALT SERPL-CCNC: 61 U/L (ref 13–61)
AST SERPL-CCNC: 46 U/L (ref 15–37)
CREAT SERPL-MCNC: 1 MG/DL (ref 0.7–1.2)
GFR SERPL CREATININE-BSD FRML MDRD: >60 ML/MIN/1.73M2
GLUCOSE SERPL-MCNC: 93 MG/DL (ref 74–106)
POTASSIUM SERPL-SCNC: 4.3 MMOL/L (ref 3.5–5)

## 2020-03-05 ENCOUNTER — OFFICE VISIT (OUTPATIENT)
Dept: URGENT CARE | Facility: URGENT CARE | Age: 40
End: 2020-03-05
Payer: COMMERCIAL

## 2020-03-05 ENCOUNTER — NURSE TRIAGE (OUTPATIENT)
Dept: NURSING | Facility: CLINIC | Age: 40
End: 2020-03-05

## 2020-03-05 ENCOUNTER — ANCILLARY PROCEDURE (OUTPATIENT)
Dept: GENERAL RADIOLOGY | Facility: CLINIC | Age: 40
End: 2020-03-05
Attending: PHYSICIAN ASSISTANT
Payer: COMMERCIAL

## 2020-03-05 VITALS
DIASTOLIC BLOOD PRESSURE: 80 MMHG | SYSTOLIC BLOOD PRESSURE: 151 MMHG | TEMPERATURE: 99.9 F | WEIGHT: 263 LBS | HEART RATE: 78 BPM | OXYGEN SATURATION: 98 % | RESPIRATION RATE: 20 BRPM | BODY MASS INDEX: 35.18 KG/M2

## 2020-03-05 DIAGNOSIS — J02.9 SORE THROAT: ICD-10-CM

## 2020-03-05 DIAGNOSIS — J18.9 PNEUMONIA OF RIGHT LOWER LOBE DUE TO INFECTIOUS ORGANISM: Primary | ICD-10-CM

## 2020-03-05 LAB
DEPRECATED S PYO AG THROAT QL EIA: NEGATIVE
FLUAV+FLUBV AG SPEC QL: NEGATIVE
FLUAV+FLUBV AG SPEC QL: NEGATIVE
SPECIMEN SOURCE: NORMAL
STREP GROUP A PCR: NOT DETECTED

## 2020-03-05 PROCEDURE — 71046 X-RAY EXAM CHEST 2 VIEWS: CPT

## 2020-03-05 PROCEDURE — 87651 STREP A DNA AMP PROBE: CPT | Performed by: FAMILY MEDICINE

## 2020-03-05 PROCEDURE — 99214 OFFICE O/P EST MOD 30 MIN: CPT | Performed by: PHYSICIAN ASSISTANT

## 2020-03-05 PROCEDURE — 87804 INFLUENZA ASSAY W/OPTIC: CPT | Performed by: FAMILY MEDICINE

## 2020-03-05 RX ORDER — AZITHROMYCIN 250 MG/1
TABLET, FILM COATED ORAL
Qty: 6 TABLET | Refills: 0 | Status: SHIPPED | OUTPATIENT
Start: 2020-03-05 | End: 2020-05-20

## 2020-03-05 NOTE — PATIENT INSTRUCTIONS
Patient Education     Pneumonia (Adult)  Pneumonia is an infection deep within the lungs. It is in the small air sacs (alveoli). Pneumonia may be caused by a virus or bacteria. Pneumonia caused by bacteria is usually treated with an antibiotic. Severe cases may need to be treated in the hospital. Milder cases can be treated at home. Symptoms usually start to get better during the first 2 days of treatment.    Home care  Follow these guidelines when caring for yourself at home:    Rest at home for the first 2 to 3 days, or until you feel stronger. Don t let yourself get overly tired when you go back to your activities.    Stay away from cigarette smoke - yours or other people s.    You may use acetaminophen or ibuprofen to control fever or pain, unless another medicine was prescribed. If you have chronic liver or kidney disease, talk with your healthcare provider before using these medicines. Also talk with your provider if you ve had a stomach ulcer or gastrointestinal bleeding. Don t give aspirin to anyone younger than 18 years of age who is ill with a fever. It may cause severe liver damage.    Your appetite may be poor, so a light diet is fine.    Drink 6 to 8 glasses of fluids every day to make sure you are getting enough fluids. Beverages can include water, sport drinks, sodas without caffeine, juices, tea, or soup. Fluids will help loosen secretions in the lung. This will make it easier for you to cough up the phlegm (sputum). If you also have heart or kidney disease, check with your healthcare provider before you drink extra fluids.    Take antibiotic medicine prescribed until it is all gone, even if you are feeling better after a few days.  Follow-up care  Follow up with your healthcare provider in the next 2 to 3 days, or as advised. This is to be sure the medicine is helping you get better.  If you are 65 or older, you should get a pneumococcal vaccine and a yearly flu (influenza) shot. You should also  get these vaccines if you have chronic lung disease like asthma, emphysema, or COPD. Recently, a second type of pneumonia vaccine has become available for everyone over 65 years old. This is in addition to the previous vaccine. Ask your provider about this.  When to seek medical advice  Call your healthcare provider right away if any of these occur:    You don t get better within the first 48 hours of treatment    Shortness of breath gets worse    Rapid breathing (more than 25 breaths per minute)    Coughing up blood    Chest pain gets worse with breathing    Fever of 100.4 F (38 C) or higher that doesn t get better with fever medicine    Weakness, dizziness, or fainting that gets worse    Thirst or dry mouth that gets worse    Sinus pain, headache, or a stiff neck    Chest pain not caused by coughing  Date Last Reviewed: 1/1/2017 2000-2019 [employer]. [address] [place] [address], [place] [address]. All rights reserved. This information is not intended as a substitute for professional medical care. Always follow your healthcare professional's instructions.

## 2020-03-05 NOTE — TELEPHONE ENCOUNTER
Sick since 2/27/2020  Like a mild cold with runny nose and sinus pressure  Cough and headache - difficulty sleeping  Headache is pretty bad.  Achy all over  Doesn't think he has a fever.    Recent severe staph infections and had been on antibiotics for a very long time.      Additional Information    Negative: Severe difficulty breathing (e.g., struggling for each breath, speaks in single words)    Negative: Bluish (or gray) lips or face    Negative: Shock suspected (e.g., cold/pale/clammy skin, too weak to stand, low BP, rapid pulse)    Negative: Sounds like a life-threatening emergency to the triager    Negative: Headache and stiff neck (can't touch chin to chest)    Negative: Chest pain (EXCEPTION: MILD central chest pain, present only when coughing)    Negative: Difficulty breathing that is not severe and not relieved by cleaning out the nose    Negative: Patient sounds very sick or weak to the triager    Negative: Fever > 104 F (40 C)    Negative: Fever > 101 F (38.3 C) and over 60 years of age    Negative: Fever > 100.0 F (37.8 C) and diabetes mellitus or weak immune system (e.g., HIV positive, cancer chemo, splenectomy, organ transplant, chronic steroids)    Negative: Fever > 100.0 F (37.8 C) and bedridden (e.g., nursing home patient, stroke, chronic illness, recovering from surgery)    Negative: HIGH RISK (e.g., age > 64 years, pregnant, HIV+, chronic medical condition) and flu symptoms    Negative: Using nasal washes and pain medicine > 24 hours and sinus pain (lower forehead, cheekbone, or eye) persists    Negative: Fever present > 3 days (72 hours)    Negative: Fever returns after gone for over 24 hours and symptoms worse (or not improved)    Negative: Earache    Patient wants to be seen    Protocols used: INFLUENZA - SEASONAL-A-OH  Trinidad SAUER RN [facility]

## 2020-03-05 NOTE — PROGRESS NOTES
SUBJECTIVE:   Denny Herrera is a 39 year old male presenting with a chief complaint of fever, chills, cough - non-productive, sore throat, headache and body aches.  Onset of symptoms was 1 week(s) ago. Fever started today on day 7   Course of illness is worsening.    Severity moderate  Current and Associated symptoms: as above  Treatment measures tried include Tylenol/Ibuprofen.  Predisposing factors include exposure to strep and exposure to influenza.    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     Raynaud disease      Raynaud's syndrome 3/14/2014     Current Outpatient Medications   Medication Sig Dispense Refill     allopurinol (ZYLOPRIM) 300 MG tablet TAKE 1 TABLET BY MOUTH EVERY DAY AND 1/2 TABLET BY MOUTH EVERY EVENING 135 tablet 3     buPROPion (WELLBUTRIN XL) 150 MG 24 hr tablet TAKE 1 TABLET EVERY MORNING 90 tablet 0     fenofibrate (TRIGLIDE/LOFIBRA) 160 MG tablet TAKE 1 TABLET DAILY 90 tablet 0     lisinopril (PRINIVIL/ZESTRIL) 10 MG tablet Take 1 tablet (10 mg) by mouth daily 90 tablet 2     multivitamin, therapeutic with minerals (MULTI-VITAMIN) TABS Take 1 tablet by mouth daily       Omega-3 Fatty Acids (OMEGA-3 FISH OIL PO) Take 1 capsule by mouth daily       polyethylene glycol (MIRALAX) powder Take 17 g (1 capful) by mouth daily 510 g 1     VITAMIN D, CHOLECALCIFEROL, PO Take by mouth daily       Social History     Tobacco Use     Smoking status: Never Smoker     Smokeless tobacco: Never Used     Tobacco comment: never smoked   Substance Use Topics     Alcohol use: Yes     Alcohol/week: 0.0  - 1.7 standard drinks     Comment:  rarely (up to 4 beers a month)       ROS:  10 point ROS negative except as listed above      OBJECTIVE:  BP (!) 151/80   Pulse 78   Temp 99.9  F (37.7  C) (Tympanic)   Resp 20   Wt 119.3 kg (263 lb)   SpO2 98%   BMI 35.18 kg/m    GENERAL APPEARANCE: healthy, alert and no distress  EYES: EOMI,  PERRL, conjunctiva clear  HENT: ear canals and TM's normal.  Nose and mouth without ulcers, erythema or lesions  NECK: supple, nontender, no lymphadenopathy  RESP: lungs clear to auscultation - no rales, rhonchi or wheezes  CV: regular rates and rhythm, normal S1 S2, no murmur noted  ABDOMEN:  soft, nontender, no HSM or masses and bowel sounds normal  NEURO: Normal strength and tone, sensory exam grossly normal,  normal speech and mentation  SKIN: no suspicious lesions or rashes    X-ray image initially interpreted in clinic by me indicate RLL pneumonia.        Results for orders placed or performed in visit on 03/05/20   XR Chest 2 Views     Status: None    Narrative    CHEST TWO VIEWS  3/5/2020 11:56 AM     HISTORY: Sore throat.    COMPARISON: January 5, 2019       Impression    IMPRESSION:  There are no acute infiltrates. The cardiac silhouette is  not enlarged. Pulmonary vasculature is unremarkable.     DARIANA DIAMOND MD   Results for orders placed or performed in visit on 03/05/20   Influenza A/B antigen     Status: None   Result Value Ref Range    Influenza A/B Agn Specimen Nasal     Influenza A Negative NEG^Negative    Influenza B Negative NEG^Negative   Streptococcus A Rapid Scr w Reflx to PCR     Status: None    Specimen: Throat   Result Value Ref Range    Strep Specimen Description Throat     Streptococcus Group A Rapid Screen Negative NEG^Negative   Group A Streptococcus PCR Throat Swab     Status: None    Specimen: Throat   Result Value Ref Range    Specimen Description Throat     Strep Group A PCR Not Detected NDET^Not Detected         ASSESSMENT:  (J18.1) Pneumonia of  right lower lobe due to infectious organism (H)  (primary encounter diagnosis)  Plan: azithromycin (ZITHROMAX) 250 MG tablet, Group A        Streptococcus PCR Throat Swab       (J02.9) Sore throat  Comment:No evidence of abscess or strep infection appreciated.    Plan: Influenza A/B antigen, Streptococcus A Rapid         Scr w Reflx to PCR, XR Chest 2 Views, Group A         Streptococcus PCR Throat Swab        Red flags and emergent follow up discussed, and understood by patient  Follow up with PCP if symptoms worsen or fail to improve      Patient Instructions     Patient Education     Pneumonia (Adult)  Pneumonia is an infection deep within the lungs. It is in the small air sacs (alveoli). Pneumonia may be caused by a virus or bacteria. Pneumonia caused by bacteria is usually treated with an antibiotic. Severe cases may need to be treated in the hospital. Milder cases can be treated at home. Symptoms usually start to get better during the first 2 days of treatment.    Home care  Follow these guidelines when caring for yourself at home:    Rest at home for the first 2 to 3 days, or until you feel stronger. Don t let yourself get overly tired when you go back to your activities.    Stay away from cigarette smoke - yours or other people s.    You may use acetaminophen or ibuprofen to control fever or pain, unless another medicine was prescribed. If you have chronic liver or kidney disease, talk with your healthcare provider before using these medicines. Also talk with your provider if you ve had a stomach ulcer or gastrointestinal bleeding. Don t give aspirin to anyone younger than 18 years of age who is ill with a fever. It may cause severe liver damage.    Your appetite may be poor, so a light diet is fine.    Drink 6 to 8 glasses of fluids every day to make sure you are getting enough fluids. Beverages can include water, sport drinks, sodas without caffeine, juices, tea, or soup. Fluids will help loosen secretions  in the lung. This will make it easier for you to cough up the phlegm (sputum). If you also have heart or kidney disease, check with your healthcare provider before you drink extra fluids.    Take antibiotic medicine prescribed until it is all gone, even if you are feeling better after a few days.  Follow-up care  Follow up with your healthcare provider in the next 2 to 3 days, or as advised. This is to be sure the medicine is helping you get better.  If you are 65 or older, you should get a pneumococcal vaccine and a yearly flu (influenza) shot. You should also get these vaccines if you have chronic lung disease like asthma, emphysema, or COPD. Recently, a second type of pneumonia vaccine has become available for everyone over 65 years old. This is in addition to the previous vaccine. Ask your provider about this.  When to seek medical advice  Call your healthcare provider right away if any of these occur:    You don t get better within the first 48 hours of treatment    Shortness of breath gets worse    Rapid breathing (more than 25 breaths per minute)    Coughing up blood    Chest pain gets worse with breathing    Fever of 100.4 F (38 C) or higher that doesn t get better with fever medicine    Weakness, dizziness, or fainting that gets worse    Thirst or dry mouth that gets worse    Sinus pain, headache, or a stiff neck    Chest pain not caused by coughing  Date Last Reviewed: 1/1/2017 2000-2019 [employer]. [address] [place] [address], [place] [address]. All rights reserved. This information is not intended as a substitute for professional medical care. Always follow your healthcare professional's instructions.

## 2020-03-05 NOTE — LETTER
[facility]  [address]  [address]  [place] [facility] [address]  Phone: [phone]  Fax: [phone]    March 5, 2020        Denny Herrera  [address] [address]          To whom it may concern:    RE: Denny Herrera    Patient was seen and treated today at our clinic and missed work.  Please excuse absences on 3/4 - 3/6/2020.    Please contact me for questions or concerns.      Sincerely,        Gopal Cristina PA-C

## 2020-03-09 ENCOUNTER — NURSE TRIAGE (OUTPATIENT)
Dept: NURSING | Facility: CLINIC | Age: 40
End: 2020-03-09

## 2020-03-09 ENCOUNTER — OFFICE VISIT (OUTPATIENT)
Dept: URGENT CARE | Facility: URGENT CARE | Age: 40
End: 2020-03-09
Payer: COMMERCIAL

## 2020-03-09 VITALS
SYSTOLIC BLOOD PRESSURE: 132 MMHG | TEMPERATURE: 98.2 F | OXYGEN SATURATION: 99 % | DIASTOLIC BLOOD PRESSURE: 86 MMHG | HEART RATE: 77 BPM | RESPIRATION RATE: 26 BRPM | BODY MASS INDEX: 35.18 KG/M2 | WEIGHT: 263 LBS

## 2020-03-09 DIAGNOSIS — R51.9 NONINTRACTABLE HEADACHE, UNSPECIFIED CHRONICITY PATTERN, UNSPECIFIED HEADACHE TYPE: Primary | ICD-10-CM

## 2020-03-09 PROCEDURE — 99213 OFFICE O/P EST LOW 20 MIN: CPT | Performed by: FAMILY MEDICINE

## 2020-03-09 NOTE — TELEPHONE ENCOUNTER
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  PCP Dr AYO germain left and has not PCP assigned yet  At [facility] .   Presenting problem : Pt called. Chest tightness which Pt describes as  breathing problems ( not chest pain )  started  3/5/20 and Dx with pneumonia 3/5/20 .   Currently : , temp 99.5 orally , sweaty at times still  , constant headache but improved today up to 3/10 .  Has not been working since 3/4/20 .   Guideline used : pneumonia on antibiotic follow up call . HARRY  .    Disposition and recommendations : Go to Ed now or PCP triage  But Pt will return to [facility] . - FNA advised to apply face mask on arrival to Urgent Care and advise  still sweaty even though no fever on thermometer .    Caller verbalizes understanding and denies further questions and will call back if further symptoms to triage or questions  . Alysa Borges RN  - [place] Nurse Advisor      Reason for Disposition    MODERATE difficulty breathing (e.g., speaks in phrases, SOB even at rest, pulse 100-120)    Additional Information    Negative: Severe difficulty breathing (e.g., struggling for each breath, speaks in single words)    Negative: Bluish (or gray) lips or face now    Negative: Difficult to awaken or acting confused (e.g., disoriented, slurred speech)    Negative: Stridor    Negative: Slow, shallow and weak breathing    Negative: Sounds like a life-threatening emergency to the triager    Negative: Recently discharged from hospital with diagnosis of pneumonia    Protocols used: PNEUMONIA ON ANTIBIOTIC FOLLOW-UP CALL-A-

## 2020-03-09 NOTE — PROGRESS NOTES
"SUBJECTIVE:  Denny Herrera is a 39 year old male who presents to the clinic today with a chief complaint of  Symptoms started 10 days ago.   Started with headache, runny nose, \"cold symptoms\".  Then felt some improvement. For 1 day.  Then started to get worse with headaches. At the time with facial pain. Stayed home from work on Wednesday -Friday last week.     No fevers >100.   Occasional coughing.     Headache:  The headache started gradually, at first improved with acetaminophen.   No vomiting, some nausea.   No blurry vision.  NO weakness in limbs.  No trauma to head.  Has improved today.  NO facial pain.  No congestion or nasal discharge    Normal PO    Was prescribed a Kyaw last week.  --no improvement.    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 6 tablet 0     buPROPion (WELLBUTRIN XL) 150 MG 24 hr tablet TAKE 1 TABLET EVERY MORNING 90 tablet 0     fenofibrate (TRIGLIDE/LOFIBRA) 160 MG tablet TAKE 1 TABLET DAILY 90 tablet 0     lisinopril (PRINIVIL/ZESTRIL) 10 MG tablet Take 1 tablet (10 mg) by mouth daily 90 tablet 2     multivitamin, therapeutic with minerals (MULTI-VITAMIN) TABS Take 1 tablet by mouth daily       Omega-3 Fatty Acids (OMEGA-3 FISH OIL " PO) Take 1 capsule by mouth daily       polyethylene glycol (MIRALAX) powder Take 17 g (1 capful) by mouth daily 510 g 1     VITAMIN D, CHOLECALCIFEROL, PO Take by mouth daily         Social History     Tobacco Use     Smoking status: Never Smoker     Smokeless tobacco: Never Used     Tobacco comment: never smoked   Substance Use Topics     Alcohol use: Yes     Alcohol/week: 0.0 - 1.7 standard drinks     Comment:  rarely (up to 4 beers a month)       ROS  CONSTITUTIONAL:NEGATIVE for fever, chills, change in weight  INTEGUMENTARY/SKIN: NEGATIVE for worrisome rashes, moles or lesions  EYES: NEGATIVE for vision changes or irritation  ENT/MOUTH: NEGATIVE for ear, mouth and throat problems  RESP:NEGATIVE for significant cough or SOB  CV: NEGATIVE for chest pain, palpitations or peripheral edema  GI: NEGATIVE for nausea, abdominal pain, heartburn, or change in bowel habits  NEURO: NEGATIVE for weakness, dizziness or paresthesias    OBJECTIVE:  /86   Pulse 77   Temp 98.2  F (36.8  C) (Tympanic)   Resp 26   Wt 119.3 kg (263 lb)   SpO2 99%   BMI 35.18 kg/m      Gen: well appearing, in NAD  CV: normal rate and rhythm without murmur   Resp: no increased work of breathing, normal lung sounds, no crackle or wheeze  Ears: normal TM bilaterally  Throat: no erythema, no exudate, normal tonsils  No lymphadenopathy  No facial pain on palpation  Neuro: cn 2-12 intact, negative pronator drift, normal finger to nose DTR 2/4, strength symmetric    ASSESSMENT:    1. Nonintractable headache, unspecified chronicity pattern, unspecified headache type  The patient had a recent upper respiratory infection treated with azithromycin.  Overall improving without new symptosm.  Advised acetaminophen, ibuprofen for pain.  If severe pain occurs or any neurological symptoms or fever then return to [facility]

## 2020-03-09 NOTE — PATIENT INSTRUCTIONS
Patient Education   Patient Education   1. Try 2-3 tabs of 200 mg ibuprofen every 8 hours (three times daily) for headache.  Drink plenty of water while taking ibuprofen.  You can take acetaminophen with ibuprofen --they work differently  2. If headache gets much worse or you have any neurological change (change in vision, etc) then be seen in the ER  3. If you have fevers above 100.4 then return to the clinic  Understanding the Cold Virus  Colds are the most common illness that people get. Most adults get 2 or 3 colds per year, and most children get 5 to 7 colds per year. Colds may be caused by over 200 types of viruses. The most common of these are rhinoviruses ( rhino  refers to the nose).  What causes a cold virus?  All colds start with infection by a virus. You can be infected by more than 1 cold virus at a time. Colds and flu are respiratory illnesses, but they are caused by different viruses. Infection with cold viruses happens when:    You breathe in a virus from the air. This can happen when someone with a cold sneezes or coughs near you.    You touch your eyes, nose, or mouth when your hand has a cold virus on it. This can happen if you touch an object that has the cold virus on it.  What are the symptoms of a cold virus?  You may wonder if you have a cold or the flu. Compared to the flu, cold symptoms come on more gradually. Almost all colds cause a stuffy nose. Other common cold symptoms include:    Runny nose    Sneezing    Sore throat    Cough    Fatigue (sometimes)    Fever (rare)    Headache (rare)  How is a cold treated?  Colds usually last 5 to 10 days. Treatment focuses on relieving symptoms. Treatments may include:    Decongestant medicines. Several types of decongestants are available without prescription. These may help reduce stuffy or runny nose symptoms.    Prescription or over-the-counter nasal sprays. These may help reduce nasal symptoms, including stuffiness.    Over-the-counter (OTC)  pain medicines. These can help with headaches and sore throat.    Self-care. This includes extra rest, using humidifiers, and drinking more fluids. These help you feel better while you are getting over a cold.  Because viruses cause colds, antibiotics do not help. They do not make a cold shorter or relieve symptoms. Taking antibiotics when you don t need them can make them work less well when you need them for another illness.  Follow all directions for using medicines, especially when giving them to children. Contact your healthcare provider if you have any questions about using cold medicines safely. Before buying cold medicines, make a list of any prescription medicines you take and ask the pharmacist what OTC cold medicines are safe for you to use.  Can a cold be prevented?  You can help reduce the spread of cold viruses. This can help both you and others avoid getting colds. Follow these tips:    Wash your hands well anytime you may have come into contact with cold viruses. Wash your hands for at least 20 seconds. When you can t wash with soap and water, use an alcohol-based hand .    Don t touch your nose, eyes, or mouth, especially after touching something that may have a cold virus on it.    Cover your mouth and nose when you cough or sneeze. Throw away tissues after using them and wash your hands.    Disinfect things you touch often, such as phones and keyboards.      Stay home when you have a cold.  What are possible complications of a cold virus?  Colds usually go away by themselves. But you may get another type of infection while you have a cold. These can include:    Sinus infection    Lung infection, such as bronchitis or pneumonia    Ear infection  If you have asthma or chronic bronchitis, a cold can make your condition worse.  When should I call my healthcare provider?  Call your healthcare provider right away if you have any of these:    Fever of 100.4 F (38 C) or higher, or as directed by  your healthcare provider    Cough, chest pain, or shortness of breath that gets worse    Symptoms don t get better or get worse after about 10 days    Headache, sleepiness, or confusion that gets worse  Date Last Reviewed: 3/28/2016    6987-9181 [employer]. [address] [place] [address], [place] [address]. All rights reserved. This information is not intended as a substitute for professional medical care. Always follow your healthcare professional's instructions.           Tension Headache    A muscle tension headache is a very common cause of head pain. It s also called a stress headache. When some people are under stress, they tense the muscles of their shoulder, neck, and scalp without knowing it. If this tension lasts long enough, a headache can occur. A tension headache can be quite painful. It can last for hours or even days.  Home care  Follow these tips when caring for yourself at home:    Don t drive yourself home if you were given pain medicine for your headache. Instead, have someone else drive you home. Try to sleep when you get home. You should feel much better when you wake up.    Put heat on the back of your neck to help ease neck spasm.      How to prevent tension-type headaches    Figure out what is causing stress in your life. Learn new ways to handle your stress. Ideas include regular exercise, biofeedback, self-hypnosis, yoga, and meditation. Talk with your healthcare provider to find out more information about managing stress. Many books and digital media are also available on this subject.    Take time out at the first sign of a tension headache, if possible. Take yourself out of the stressful situation. Find a quiet, comfortable place to sit or lie down and let yourself relax. Heat and deep massage of the tight areas in the neck and shoulders may help ease muscle spasm. You may also get relief from a medicine like ibuprofen or a prescribed muscle relaxant.    Follow-up care  Follow up  with your healthcare provider, or as advised. Talk with your provider if you have frequent headaches. He or she can figure out a treatment plan. Ask if you can have medicine to take at home the next time you get a bad headache. This may keep you from having to visit the emergency department in the future. You may need to see a headache specialist (neurologist) if you continue to have headaches.  When to seek medical advice  Call your healthcare provider right away if any of these occur:    Your head pain gets worse during sexual intercourse or strenuous activity    Your head pain doesn t get better within 24 hours    You aren t able to keep liquids down (repeated vomiting)    Fever of 100.4 F (38 C) or higher, or as directed by your healthcare provider    Stiff neck    Extreme drowsiness, confusion, or fainting    Dizziness or dizziness with spinning sensation (vertigo)    Weakness in an arm or leg or one side of your face    You have difficulty speaking    Your vision changes  Date Last Reviewed: 8/1/2016 2000-2019 [employer]. [address] [place] [address], [place] [address]. All rights reserved. This information is not intended as a substitute for professional medical care. Always follow your healthcare professional's instructions.

## 2020-03-09 NOTE — LETTER
March 9, 2020      Denny Herrera  [address] [address] [facility] [address]        To Whom It May Concern:    Denny Herrera was seen in our clinic. He may return to work without restrictions.  Please excuse for missed work 3/9-3/10      Sincerely,        Elena Gilmore MD

## 2020-04-09 DIAGNOSIS — I10 BENIGN ESSENTIAL HYPERTENSION: ICD-10-CM

## 2020-04-09 DIAGNOSIS — E78.1 HYPERTRIGLYCERIDEMIA: ICD-10-CM

## 2020-04-09 DIAGNOSIS — M10.9 PODAGRA: ICD-10-CM

## 2020-04-09 RX ORDER — FENOFIBRATE 160 MG/1
TABLET ORAL
Qty: 90 TABLET | Refills: 0 | Status: SHIPPED | OUTPATIENT
Start: 2020-04-09 | End: 2020-07-02

## 2020-04-09 RX ORDER — ALLOPURINOL 300 MG/1
TABLET ORAL
Qty: 135 TABLET | Refills: 0 | Status: SHIPPED | OUTPATIENT
Start: 2020-04-09 | End: 2020-07-02

## 2020-04-09 RX ORDER — LISINOPRIL 10 MG/1
10 TABLET ORAL DAILY
Qty: 90 TABLET | Refills: 0 | Status: SHIPPED | OUTPATIENT
Start: 2020-04-09 | End: 2020-07-02

## 2020-04-09 NOTE — TELEPHONE ENCOUNTER
Prescription refilled x 1 per [facility] Refill Protocol.    TC/MA Team, please outreach to patient to help set up a fasting physical when appropriate.

## 2020-04-13 NOTE — TELEPHONE ENCOUNTER
Called patient and assisted in scheduling physical on 07/14.     Asha Magaña MA 1:21 PM 4/13/2020

## 2020-05-20 ENCOUNTER — NURSE TRIAGE (OUTPATIENT)
Dept: NURSING | Facility: CLINIC | Age: 40
End: 2020-05-20

## 2020-05-20 ENCOUNTER — OFFICE VISIT (OUTPATIENT)
Dept: PEDIATRICS | Facility: CLINIC | Age: 40
End: 2020-05-20
Payer: COMMERCIAL

## 2020-05-20 VITALS
HEART RATE: 68 BPM | BODY MASS INDEX: 36.25 KG/M2 | RESPIRATION RATE: 18 BRPM | SYSTOLIC BLOOD PRESSURE: 126 MMHG | WEIGHT: 271 LBS | TEMPERATURE: 97.2 F | DIASTOLIC BLOOD PRESSURE: 70 MMHG | OXYGEN SATURATION: 98 %

## 2020-05-20 DIAGNOSIS — Z20.822 EXPOSURE TO COVID-19 VIRUS: ICD-10-CM

## 2020-05-20 DIAGNOSIS — S29.012A STRAIN OF THORACIC BACK REGION: Primary | ICD-10-CM

## 2020-05-20 PROCEDURE — 99214 OFFICE O/P EST MOD 30 MIN: CPT | Performed by: PHYSICIAN ASSISTANT

## 2020-05-20 RX ORDER — HYDROCODONE BITARTRATE AND ACETAMINOPHEN 5; 325 MG/1; MG/1
1 TABLET ORAL EVERY 6 HOURS PRN
Qty: 10 TABLET | Refills: 0 | Status: SHIPPED | OUTPATIENT
Start: 2020-05-20 | End: 2020-05-23

## 2020-05-20 RX ORDER — CYCLOBENZAPRINE HCL 10 MG
10 TABLET ORAL 3 TIMES DAILY PRN
Qty: 10 TABLET | Refills: 0 | Status: SHIPPED | OUTPATIENT
Start: 2020-05-20 | End: 2020-07-02

## 2020-05-20 NOTE — PROGRESS NOTES
Subjective     Denny Herrera is a 39 year old male who presents to clinic today for the following health issues:    HPI   Back Pain     Duration: x 1 day severe, x 2-3 weeks moderate        Specific cause: lifting, turning/bending    Description:   Location of pain: low back left, middle of back left   character of pain: sharp  Pain radiation:none  New numbness or weakness in legs, not attributed to pain:  no     Intensity: Currently 5-8/10    History:   Pain interferes with job: YES  History of back problems: neck surgery for disc only - no back issues  Any previous MRI or X-rays: None  Sees a specialist for back pain:  No  Therapies tried without relief: cold, heat, massage, muscle relaxants and NSAIDs    Alleviating factors:   Improved by: nothing is helping today; has tried ibu, diazepam (leftover) and ice     Precipitating factors:  Worsened by: movement alone    Accompanying Signs & Symptoms:  Risk of Fracture:  None  Risk of Cauda Equina:  None  Risk of Infection:  None  Risk of Cancer:  None  Risk of Ankylosing Spondylitis:  Onset at age <35, male, AND morning back stiffness. no     No new exercises, walking, lifting. Patient states he brought a bed upstairs two nights ago.  Patient works in IT.     Patient was exposed to COVID and had symptoms approx 6 weeks ago. Inquiring about serology testing.    Review of Systems   Constitutional, HEENT, cardiovascular, pulmonary, gi and gu systems are negative, except as otherwise noted.      Objective    /70 (BP Location: Right arm, Patient Position: Sitting, Cuff Size: Adult Large)   Pulse 68   Temp 97.2  F (36.2  C)   Resp 18   Wt 122.9 kg (271 lb)   SpO2 98%   BMI 36.25 kg/m    Body mass index is 36.25 kg/m .  Physical Exam   ORTHO: Lumber/Thoracic Spine Exam: Tender:  left parathoracic muscles, left para lumbar muscles  Range of Motion: full  Strength: full    Diagnostic Test Results:  No results found for this or any previous visit (from the past 24  hour(s)).        Assessment & Plan   1. Strain of thoracic back region  Begin flexeril and ibuprofen. norco PRN severe pain. Continue with gentle stretching and exercises.  - cyclobenzaprine (FLEXERIL) 10 MG tablet; Take 1 tablet (10 mg) by mouth 3 times daily as needed for muscle spasms  Dispense: 10 tablet; Refill: 0  - HYDROcodone-acetaminophen (NORCO) 5-325 MG tablet; Take 1 tablet by mouth every 6 hours as needed for pain  Dispense: 10 tablet; Refill: 0    2. Exposure to Covid-19 Virus  Patient may have this completed at his convenience.  - COVID-19 Virus (Coronavirus) Antibody; Future     Franky Blanco PA-C  [facility]

## 2020-05-20 NOTE — TELEPHONE ENCOUNTER
Denny is calling and states for the past couple of weeks is having lower back pain.  This morning bend over and developed low back pain.  Denies fever, cough and shortness of breath.        COVID 19 Nurse Triage Plan/Patient Instructions    Please be aware that novel coronavirus (COVID-19) may be circulating in the community. If you develop symptoms such as fever, cough, or SOB or if you have concerns about the presence of another infection including coronavirus (COVID-19), please contact your health care provider or visit [email or web address].     Disposition/Instructions    Patient to have scheduled Telephone Visit with a provider. Follow System Ambulatory Workflow for COVID 19.     The clinic staff will assist you to schedule an appointment to complete the Telephone Visit with a provider during normal clinic hours.       Call Back If: Your symptoms worsen before you are able to complete your Telephone Visit with a provider.    Thank you for limiting contact with others, wearing a simple mask to cover your cough, practice good hand hygiene habits and accessing our virtual services where possible to limit the spread of this virus.    For more information about COVID19 and options for caring for yourself at home, please visit the CDC website at https://www.cdc.gov/coronavirus/2019-ncov/about/steps-when-sick.html  For more options for care at [facility], please visit our website at [email or web address]    For more information, please use the [place] Department of Health COVID-19 Website: https://www.health.[facility].[place].us/diseases/coronavirus/index.html  [place] Department of Health ([facility]) COVID-19 Hotlines (Interpreters available):      Health questions: Phone Number: [phone] or [phone] and Hours: 7 a.m. to 7 p.m.    Schools and  questions: Phone Number: [phone] or [phone] and Hours 7 a.m. to 7 p.m.                    Additional Information    Negative:  Passed out (i.e., lost consciousness, collapsed and was not responding)    Negative: Shock suspected (e.g., cold/pale/clammy skin, too weak to stand, low BP, rapid pulse)    Negative: Sounds like a life-threatening emergency to the triager    Negative: [1] SEVERE back pain (e.g., excruciating) AND [2] sudden onset AND [3] age > 60    Negative: [1] Unable to urinate (or only a few drops) > 4 hours AND [2] bladder feels very full (e.g., palpable bladder or strong urge to urinate)    Negative: [1] Loss of bladder or bowel control (urine or bowel incontinence; wetting self, leaking stool) AND [2] new onset    Negative: Numbness in groin or rectal area (i.e., loss of sensation)    Negative: [1] SEVERE abdominal pain AND [2] present > 1 hour    Negative: [1] Abdominal pain AND [2] age > 60    Negative: Weakness of a leg or foot (e.g., unable to bear weight, dragging foot)    Negative: Unable to walk    Negative: Patient sounds very sick or weak to the triager    [1] SEVERE back pain (e.g., excruciating, unable to do any normal activities) AND [2] not improved 2 hours after pain medicine    Protocols used: BACK PAIN-A-

## 2020-05-22 DIAGNOSIS — Z20.822 EXPOSURE TO COVID-19 VIRUS: ICD-10-CM

## 2020-05-22 PROCEDURE — 36415 COLL VENOUS BLD VENIPUNCTURE: CPT | Performed by: PHYSICIAN ASSISTANT

## 2020-05-22 PROCEDURE — 86769 SARS-COV-2 COVID-19 ANTIBODY: CPT | Mod: 90 | Performed by: PHYSICIAN ASSISTANT

## 2020-05-22 PROCEDURE — 99000 SPECIMEN HANDLING OFFICE-LAB: CPT | Performed by: PHYSICIAN ASSISTANT

## 2020-05-23 LAB
COVID-19 SPIKE RBD ABY TITER: NORMAL
COVID-19 SPIKE RBD ABY: NEGATIVE

## 2020-06-18 ENCOUNTER — TRANSFERRED RECORDS (OUTPATIENT)
Dept: HEALTH INFORMATION MANAGEMENT | Facility: CLINIC | Age: 40
End: 2020-06-18

## 2020-06-18 LAB
ALT SERPL-CCNC: 81 U/L (ref 13–61)
AST SERPL-CCNC: 53 U/L (ref 15–37)
CREAT SERPL-MCNC: 1 MG/DL (ref 0.7–1.2)
GFR SERPL CREATININE-BSD FRML MDRD: >60 ML/MIN/1.73M2
GLUCOSE SERPL-MCNC: 107 MG/DL (ref 74–106)
POTASSIUM SERPL-SCNC: 4.3 MMOL/L (ref 3.5–5)

## 2020-07-02 ENCOUNTER — OFFICE VISIT (OUTPATIENT)
Dept: PEDIATRICS | Facility: CLINIC | Age: 40
End: 2020-07-02
Payer: COMMERCIAL

## 2020-07-02 VITALS
HEIGHT: 73 IN | WEIGHT: 269.2 LBS | TEMPERATURE: 98.3 F | BODY MASS INDEX: 35.68 KG/M2 | SYSTOLIC BLOOD PRESSURE: 122 MMHG | HEART RATE: 75 BPM | RESPIRATION RATE: 14 BRPM | DIASTOLIC BLOOD PRESSURE: 82 MMHG | OXYGEN SATURATION: 97 %

## 2020-07-02 DIAGNOSIS — K92.1 BLOOD IN STOOL: ICD-10-CM

## 2020-07-02 DIAGNOSIS — M54.50 BILATERAL LOW BACK PAIN WITHOUT SCIATICA, UNSPECIFIED CHRONICITY: ICD-10-CM

## 2020-07-02 DIAGNOSIS — M25.532 LEFT WRIST PAIN: ICD-10-CM

## 2020-07-02 DIAGNOSIS — F32.0 MILD MAJOR DEPRESSION (H): ICD-10-CM

## 2020-07-02 DIAGNOSIS — Z23 NEED FOR VACCINATION: ICD-10-CM

## 2020-07-02 DIAGNOSIS — F90.0 ATTENTION DEFICIT HYPERACTIVITY DISORDER (ADHD), PREDOMINANTLY INATTENTIVE TYPE: ICD-10-CM

## 2020-07-02 DIAGNOSIS — K76.0 FATTY LIVER: Primary | ICD-10-CM

## 2020-07-02 DIAGNOSIS — Z00.00 ROUTINE GENERAL MEDICAL EXAMINATION AT A HEALTH CARE FACILITY: ICD-10-CM

## 2020-07-02 DIAGNOSIS — E78.1 HYPERTRIGLYCERIDEMIA: ICD-10-CM

## 2020-07-02 DIAGNOSIS — M10.9 PODAGRA: ICD-10-CM

## 2020-07-02 DIAGNOSIS — I10 BENIGN ESSENTIAL HYPERTENSION: ICD-10-CM

## 2020-07-02 LAB
ERYTHROCYTE [DISTWIDTH] IN BLOOD BY AUTOMATED COUNT: 12.8 % (ref 10–15)
HBA1C MFR BLD: 5.4 % (ref 0–5.6)
HCT VFR BLD AUTO: 39.2 % (ref 40–53)
HGB BLD-MCNC: 13.5 G/DL (ref 13.3–17.7)
MCH RBC QN AUTO: 30.6 PG (ref 26.5–33)
MCHC RBC AUTO-ENTMCNC: 34.4 G/DL (ref 31.5–36.5)
MCV RBC AUTO: 89 FL (ref 78–100)
PLATELET # BLD AUTO: 243 10E9/L (ref 150–450)
RBC # BLD AUTO: 4.41 10E12/L (ref 4.4–5.9)
WBC # BLD AUTO: 7.1 10E9/L (ref 4–11)

## 2020-07-02 PROCEDURE — 99396 PREV VISIT EST AGE 40-64: CPT | Mod: 25 | Performed by: NURSE PRACTITIONER

## 2020-07-02 PROCEDURE — 36415 COLL VENOUS BLD VENIPUNCTURE: CPT | Performed by: NURSE PRACTITIONER

## 2020-07-02 PROCEDURE — 99213 OFFICE O/P EST LOW 20 MIN: CPT | Mod: 25 | Performed by: NURSE PRACTITIONER

## 2020-07-02 PROCEDURE — 85027 COMPLETE CBC AUTOMATED: CPT | Performed by: NURSE PRACTITIONER

## 2020-07-02 PROCEDURE — 80053 COMPREHEN METABOLIC PANEL: CPT | Performed by: NURSE PRACTITIONER

## 2020-07-02 PROCEDURE — 90471 IMMUNIZATION ADMIN: CPT | Performed by: NURSE PRACTITIONER

## 2020-07-02 PROCEDURE — 83036 HEMOGLOBIN GLYCOSYLATED A1C: CPT | Performed by: NURSE PRACTITIONER

## 2020-07-02 PROCEDURE — 90732 PPSV23 VACC 2 YRS+ SUBQ/IM: CPT | Performed by: NURSE PRACTITIONER

## 2020-07-02 PROCEDURE — 80061 LIPID PANEL: CPT | Performed by: NURSE PRACTITIONER

## 2020-07-02 PROCEDURE — 96127 BRIEF EMOTIONAL/BEHAV ASSMT: CPT | Performed by: NURSE PRACTITIONER

## 2020-07-02 RX ORDER — BUPROPION HYDROCHLORIDE 150 MG/1
TABLET ORAL
Qty: 90 TABLET | Refills: 3 | Status: SHIPPED | OUTPATIENT
Start: 2020-07-02 | End: 2020-07-02

## 2020-07-02 RX ORDER — CYCLOBENZAPRINE HCL 10 MG
10 TABLET ORAL 3 TIMES DAILY PRN
Qty: 20 TABLET | Refills: 3 | Status: SHIPPED | OUTPATIENT
Start: 2020-07-02 | End: 2021-12-09

## 2020-07-02 RX ORDER — BUPROPION HYDROCHLORIDE 300 MG/1
300 TABLET ORAL EVERY MORNING
Qty: 90 TABLET | Refills: 3 | Status: SHIPPED | OUTPATIENT
Start: 2020-07-02 | End: 2021-08-20

## 2020-07-02 RX ORDER — ALLOPURINOL 300 MG/1
TABLET ORAL
Qty: 135 TABLET | Refills: 0 | Status: SHIPPED | OUTPATIENT
Start: 2020-07-02 | End: 2020-12-11

## 2020-07-02 RX ORDER — LISINOPRIL 10 MG/1
10 TABLET ORAL DAILY
Qty: 90 TABLET | Refills: 3 | Status: SHIPPED | OUTPATIENT
Start: 2020-07-02 | End: 2021-08-20

## 2020-07-02 RX ORDER — FENOFIBRATE 160 MG/1
TABLET ORAL
Qty: 90 TABLET | Refills: 3 | Status: SHIPPED | OUTPATIENT
Start: 2020-07-02 | End: 2021-06-30

## 2020-07-02 ASSESSMENT — ANXIETY QUESTIONNAIRES
6. BECOMING EASILY ANNOYED OR IRRITABLE: SEVERAL DAYS
5. BEING SO RESTLESS THAT IT IS HARD TO SIT STILL: SEVERAL DAYS
3. WORRYING TOO MUCH ABOUT DIFFERENT THINGS: NOT AT ALL
GAD7 TOTAL SCORE: 3
IF YOU CHECKED OFF ANY PROBLEMS ON THIS QUESTIONNAIRE, HOW DIFFICULT HAVE THESE PROBLEMS MADE IT FOR YOU TO DO YOUR WORK, TAKE CARE OF THINGS AT HOME, OR GET ALONG WITH OTHER PEOPLE: SOMEWHAT DIFFICULT
2. NOT BEING ABLE TO STOP OR CONTROL WORRYING: NOT AT ALL
1. FEELING NERVOUS, ANXIOUS, OR ON EDGE: NOT AT ALL
7. FEELING AFRAID AS IF SOMETHING AWFUL MIGHT HAPPEN: NOT AT ALL

## 2020-07-02 ASSESSMENT — ENCOUNTER SYMPTOMS
JOINT SWELLING: 0
FREQUENCY: 0
NERVOUS/ANXIOUS: 0
EYE PAIN: 0
ARTHRALGIAS: 1
DYSURIA: 0
ABDOMINAL PAIN: 0
CONSTIPATION: 0
HEADACHES: 0
COUGH: 0
DIARRHEA: 0
CHILLS: 0
HEMATURIA: 0
DIZZINESS: 0
PARESTHESIAS: 0
SHORTNESS OF BREATH: 0
FEVER: 0
HEARTBURN: 0
SORE THROAT: 0
NAUSEA: 0
HEMATOCHEZIA: 1
PALPITATIONS: 0
WEAKNESS: 1
MYALGIAS: 1

## 2020-07-02 ASSESSMENT — PATIENT HEALTH QUESTIONNAIRE - PHQ9
5. POOR APPETITE OR OVEREATING: SEVERAL DAYS
SUM OF ALL RESPONSES TO PHQ QUESTIONS 1-9: 6

## 2020-07-02 ASSESSMENT — MIFFLIN-ST. JEOR: SCORE: 2172.02

## 2020-07-02 NOTE — PATIENT INSTRUCTIONS
Back:  -Flacot me the name of the PT you want the referral to  -We will get records from your MRI. I'll let you know if you need any further specialists    Weight:  -See weight clinic  -Increase Wellbutrin 300mg        Preventive Health Recommendations  Male Ages 40 to 49    Yearly exam:             See your health care provider every year in order to  o   Review health changes.   o   Discuss preventive care.    o   Review your medicines if your doctor has prescribed any.    You should be tested each year for STDs (sexually transmitted diseases) if you re at risk.     Have a cholesterol test every 5 years.     Have a colonoscopy (test for colon cancer) if someone in your family has had colon cancer or polyps before age 50.     After age 45, have a diabetes test (fasting glucose). If you are at risk for diabetes, you should have this test every 3 years.      Talk with your health care provider about whether or not a prostate cancer screening test (PSA) is right for you.    Shots: Get a flu shot each year. Get a tetanus shot every 10 years.     Nutrition:    Eat at least 5 servings of fruits and vegetables daily.     Eat whole-grain bread, whole-wheat pasta and brown rice instead of white grains and rice.     Get adequate Calcium and Vitamin D.     Lifestyle    Exercise for at least 150 minutes a week (30 minutes a day, 5 days a week). This will help you control your weight and prevent disease.     Limit alcohol to one drink per day.     No smoking.     Wear sunscreen to prevent skin cancer.     See your dentist every six months for an exam and cleaning.

## 2020-07-02 NOTE — PROGRESS NOTES
SUBJECTIVE:   CC: Denny Herrera is an 39 year old male who presents for preventative health visit.     Healthy Habits:     Getting at least 3 servings of Calcium per day:  Yes    Bi-annual eye exam:  NO    Dental care twice a year:  Yes    Sleep apnea or symptoms of sleep apnea:  Sleep apnea    Diet:  Regular (no restrictions)    Frequency of exercise:  None    Taking medications regularly:  Yes    Medication side effects:  Other    PHQ-2 Total Score: 2    Additional concerns today:  Yes    Concerns today: lower back pain x 7 weeks, left wrist pain  Fasting today  WELLNESS FORMS    -------------------------------------    Today's PHQ-2 Score:   PHQ-2 ( 1999 Pfizer) 7/2/2020   Q1: Little interest or pleasure in doing things 1   Q2: Feeling down, depressed or hopeless 1   PHQ-2 Score 2   Q1: Little interest or pleasure in doing things Several days   Q2: Feeling down, depressed or hopeless Several days   PHQ-2 Score 2     Abuse: Current or Past(Physical, Sexual or Emotional)- No  Do you feel safe in your environment? Yes    Social History     Tobacco Use     Smoking status: Never Smoker     Smokeless tobacco: Never Used     Tobacco comment: never smoked   Substance Use Topics     Alcohol use: Yes     Alcohol/week: 0.0 - 1.7 standard drinks     Comment:  rarely (up to 4 beers a month)       Alcohol Use 7/2/2020   Prescreen: >3 drinks/day or >7 drinks/week? No   Prescreen: >3 drinks/day or >7 drinks/week? -     Last PSA: No results found for: PSA    Reviewed orders with patient. Reviewed health maintenance and updated orders accordingly - Yes  Lab work is in process    Reviewed and updated as needed this visit by clinical staff  Tobacco  Allergies  Med Hx  Surg Hx  Fam Hx  Soc Hx        Reviewed and updated as needed this visit by Provider        Low back started 7 weeks ago. Mild physical activity flares it. Causes muscle spasms    Similar issue has happened in his neck. Happens several times per year. Can't move  "his neck, so he can't drive, etc. Takes muscle relaxers, which help somewhat. Saw spine specialist. MRI showed herniated disc. Had surgery on C5/C6. Doesn't get the flares, but does have some cracking and grinding      Review of Systems   Constitutional: Negative for chills and fever.   HENT: Negative for congestion, ear pain, hearing loss and sore throat.    Eyes: Negative for pain and visual disturbance.   Respiratory: Negative for cough and shortness of breath.    Cardiovascular: Negative for chest pain, palpitations and peripheral edema.   Gastrointestinal: Positive for hematochezia. Negative for abdominal pain, constipation, diarrhea, heartburn and nausea.   Genitourinary: Negative for discharge, dysuria, frequency, genital sores, hematuria, impotence and urgency.   Musculoskeletal: Positive for arthralgias and myalgias. Negative for joint swelling.   Skin: Negative for rash.   Neurological: Positive for weakness. Negative for dizziness, headaches and paresthesias.   Psychiatric/Behavioral: Negative for mood changes. The patient is not nervous/anxious.        OBJECTIVE:   /78 (BP Location: Right arm, Cuff Size: Adult Large)   Pulse 75   Temp 98.3  F (36.8  C) (Tympanic)   Resp 14   Ht 1.842 m (6' 0.5\")   Wt 122.1 kg (269 lb 3.2 oz)   SpO2 97%   BMI 36.01 kg/m      Physical Exam  GENERAL: healthy, alert and no distress  EYES: Eyes grossly normal to inspection, PERRL and conjunctivae and sclerae normal  HENT: ear canals and TM's normal, nose and mouth without ulcers or lesions  NECK: no adenopathy, no asymmetry, masses, or scars and thyroid normal to palpation  RESP: lungs clear to auscultation - no rales, rhonchi or wheezes  CV: regular rate and rhythm, normal S1 S2, no S3 or S4, no murmur, click or rub, no peripheral edema and peripheral pulses strong  ABDOMEN: soft, nontender, no hepatosplenomegaly, no masses and bowel sounds normal  MS: no gross musculoskeletal defects noted, no edema  SKIN: no " suspicious lesions or rashes  NEURO: Normal strength and tone, mentation intact and speech normal  PSYCH: mentation appears normal, affect normal/bright    Diagnostic Test Results:  Labs reviewed in Epic    ASSESSMENT/PLAN:   1. Routine general medical examination at a health care facility  - GLUCOSE  - HEMOGLOBIN A1C  - COMPREHENSIVE METABOLIC PANEL  - Lipid panel reflex to direct LDL Fasting    2. Podagra  Stable.   - COMPREHENSIVE METABOLIC PANEL  - CBC with Platelets    - allopurinol (ZYLOPRIM) 300 MG tablet; TAKE 1 TABLET BY MOUTH EVERY DAY AND 1/2 TABLET BY MOUTH EVERY EVENING  Dispense: 135 tablet; Refill: 0    3. Attention deficit hyperactivity disorder (ADHD), predominantly inattentive type  Stable.   - buPROPion (WELLBUTRIN XL) 150 MG 24 hr tablet; TAKE 1 TABLET EVERY MORNING  Dispense: 90 tablet; Refill: 3    4. Mild major depression (H)  Slightly worsened due to not being able to exercise due to back. Feels overall stable.  - buPROPion (WELLBUTRIN XL) 150 MG 24 hr tablet; TAKE 1 TABLET EVERY MORNING  Dispense: 90 tablet; Refill: 3    5. Hypertriglyceridemia  On fenofibrate. Needs recheck.   - fenofibrate (TRIGLIDE/LOFIBRA) 160 MG tablet; TAKE 1 TABLET DAILY  Dispense: 90 tablet; Refill: 3    6. Benign essential hypertension  Stable. Needs lab.  - COMPREHENSIVE METABOLIC PANEL  - lisinopril (ZESTRIL) 10 MG tablet; Take 1 tablet (10 mg) by mouth daily  Dispense: 90 tablet; Refill: 0    7. Need for vaccination  - ADMIN 1st VACCINE  - PPSV23, IM/SUBQ (2+ YRS) - [id number]    8. Fatty liver  -Work on weight loss  -Needs recheck  - COMPREHENSIVE METABOLIC PANEL    9. Bilateral low back pain without sciatica, unspecified chronicity  [facility] problem[facility]  -PT  -CYNTHIA for the MRI. Will determine plan from there.  - cyclobenzaprine (FLEXERIL) 10 MG tablet; Take 1 tablet (10 mg) by mouth 3 times daily as needed for muscle spasms  Dispense: 20 tablet; Refill: 3    (K92.1)  "Blood in stool  Comment: Has been worked up in the past. Had a normal colonoscopy. Has hemorrhoids. Not worsened  Plan:   -Sit on toilet shorter amount of time.       COUNSELING:   Reviewed preventive health counseling, as reflected in patient instructions    Estimated body mass index is 36.01 kg/m  as calculated from the following:    Height as of this encounter: 1.842 m (6' 0.5\").    Weight as of this encounter: 122.1 kg (269 lb 3.2 oz).     Weight management plan: Discussed healthy diet and exercise guidelines     reports that he has never smoked. He has never used smokeless tobacco.      Counseling Resources:  ATP IV Guidelines  Pooled Cohorts Equation Calculator  FRAX Risk Assessment  ICSI Preventive Guidelines  Dietary Guidelines for Americans, 2010  USDA's MyPlate  ASA Prophylaxis  Lung CA Screening    CRISELDA Augustin [facility] MANNY  "

## 2020-07-03 ENCOUNTER — TELEPHONE (OUTPATIENT)
Dept: PEDIATRICS | Facility: CLINIC | Age: 40
End: 2020-07-03

## 2020-07-03 LAB
ALBUMIN SERPL-MCNC: 4.3 G/DL (ref 3.4–5)
ALP SERPL-CCNC: 32 U/L (ref 40–150)
ALT SERPL W P-5'-P-CCNC: 104 U/L (ref 0–70)
ANION GAP SERPL CALCULATED.3IONS-SCNC: 5 MMOL/L (ref 3–14)
AST SERPL W P-5'-P-CCNC: 75 U/L (ref 0–45)
BILIRUB SERPL-MCNC: 0.6 MG/DL (ref 0.2–1.3)
BUN SERPL-MCNC: 14 MG/DL (ref 7–30)
CALCIUM SERPL-MCNC: 9.1 MG/DL (ref 8.5–10.1)
CHLORIDE SERPL-SCNC: 108 MMOL/L (ref 94–109)
CHOLEST SERPL-MCNC: 193 MG/DL
CO2 SERPL-SCNC: 23 MMOL/L (ref 20–32)
CREAT SERPL-MCNC: 1.08 MG/DL (ref 0.66–1.25)
GFR SERPL CREATININE-BSD FRML MDRD: 84 ML/MIN/{1.73_M2}
GLUCOSE SERPL-MCNC: 94 MG/DL (ref 70–99)
HDLC SERPL-MCNC: 27 MG/DL
LDLC SERPL CALC-MCNC: 131 MG/DL
NONHDLC SERPL-MCNC: 166 MG/DL
POTASSIUM SERPL-SCNC: 4.4 MMOL/L (ref 3.4–5.3)
PROT SERPL-MCNC: 8.6 G/DL (ref 6.8–8.8)
SODIUM SERPL-SCNC: 136 MMOL/L (ref 133–144)
TRIGL SERPL-MCNC: 176 MG/DL

## 2020-07-03 ASSESSMENT — ANXIETY QUESTIONNAIRES: GAD7 TOTAL SCORE: 3

## 2020-07-03 NOTE — TELEPHONE ENCOUNTER
Reason for Call:  Form, our goal is to have forms completed with 72 hours, however, some forms may require a visit or additional information.    Type of letter, form or note:  [facility]- Physician Results Form     Who is the form from?: Patient    Where did the form come from: Patient or family brought in       What clinic location was the form placed at?: [place]    Where the form was placed: Given to physician    What number is listed as a contact on the form?: Fax to [employer] at [phone] when finish.        Additional comments: Call patient when completed.     Call taken on 7/3/2020 at 3:42 PM by Marfier Kirby MA

## 2020-07-03 NOTE — TELEPHONE ENCOUNTER
Faxed form to [employer] at [phone] on 7/3/2020.   Called patient to  copy of form from  .     Marifer Kirby MA

## 2020-07-07 NOTE — PROGRESS NOTES
HISTORY OF PRESENT ILLNESS:    Denny Herrera is a 40 year old male who is seen in consultation at the request of Dr. Ahumada for left wrist pain.  Pain started December 2019. He reports stiffness of the wrists bilaterally. He notes rotating his wrist, and cracking the wrists for relief. He states one morning he felt a pop, and no improvement of pain. He recalls an injury to the wrist 20 years ago, he states he lifted his brother at that time, and felt a pop. Pain was present for 4 months.   Pain is located in the radial aspect of the wrist, deep within.   Patient worked previously as a , currently works in IT field- computer work.   [facility] to rule out carpal tunnel.     Present symptoms: Left radial sided wrist pain. Pain deep within the joint. Pain feels stiff, and sharp pains. Increased pain loading the joint, unable to do push ups. Patient reports sharp pain with lifting and carrying with the wrist in a ulnar deviated position. Patient reports acute swelling of the wrist, no obvious swelling.  Denies numbness and tingling of the wrist.  Current pain level: 2/10, Worst pain level: 8/10  Treatments tried to this point: ibuprofen, wrist brace, ibuprofen.   Orthopedic PMH: gout, multijoint pain    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     Raynaud disease      Raynaud's syndrome 3/14/2014       Past Surgical History:   Procedure Laterality Date     ARTHROSCOPY KNEE Right 6/15/2016    Procedure: ARTHROSCOPY KNEE;  Surgeon: Tim Valdez,  MD;  Location: [facility]     ARTHROTOMY WITH FRESH OSTEOCHONDRAL ALLOGRAFT KNEE Right 6/15/2016    Procedure: ARTHROTOMY WITH FRESH OSTEOCHONDRAL ALLOGRAFT KNEE;  Surgeon: Tim Valdez MD;  Location: [facility] OR     BACK SURGERY      C5-6 arthroplasty, replaced herniated disc     C NONSPECIFIC PROCEDURE  1998    10 surgeries total on both knees. 2 for R knee, 3 L knee, ACL and meniscus arthroscopic procedures x 3, 2 open surgery     COLONOSCOPY N/A 8/19/2015    Procedure: COLONOSCOPY;  Surgeon: Timbo Greenberg MD;  Location: [facility]     HEMORRHOIDECTOMY EXTERNAL N/A 3/19/2019    Procedure: External hemorrhoidectomy;  Surgeon: Patria Roberson MD;  Location: [facility] OR     ORTHOPEDIC SURGERY       OSTEOTOMY TIBIA Right 6/15/2016    Procedure: OSTEOTOMY TIBIA;  Surgeon: Tim Valdez MD;  [facility]     REMOVE HARDWARE KNEE Right 6/15/2016    Procedure: REMOVE HARDWARE KNEE;  Surgeon: Tim Valdez MD;  [facility]: [facility] OR       Family History   Problem Relation Age of Onset     Family History Negative Other         neg for RA, IBD, psoriasis, does not know much about his father side of his family, neg for gout     Attention Deficit Disorder Other      Arthritis Mother         back DJD     Alcohol/Drug Mother      Depression Mother      Endocrine Disease Mother      Anxiety Disorder Mother      Back Pain Mother      Arthritis Brother         back DJD     Heart Disease Brother         MI at 40     Hypertension Brother      Bipolar Disorder Brother      Arthritis Maternal Grandmother         back DJD     Circulatory Maternal Grandmother      Eye Disorder Maternal Grandmother      Back Pain Maternal Grandmother      Cancer Maternal Aunt         ?type     Hypertension Brother      Circulatory Maternal Grandfather      Eye Disorder Maternal Grandfather      Hypertension Other         maternal grandparents     Depression Other      Alcohol/Drug Brother      Back Pain Brother      Heart Disease Father       Heart Disease Paternal Grandmother        Social History     Socioeconomic History     Marital status:      Spouse name: Not on file     Number of children: 1     Years of education: Not on file     Highest education level: Not on file   Occupational History     Occupation: IT work     Employer: [employer]     Employer: [employer]   Social Needs     Financial resource strain: Not on file     Food insecurity     Worry: Not on file     Inability: Not on file     Transportation needs     Medical: Not on file     Non-medical: Not on file   Tobacco Use     Smoking status: Never Smoker     Smokeless tobacco: Never Used     Tobacco comment: never smoked   Substance and Sexual Activity     Alcohol use: Yes     Alcohol/week: 0.0 - 1.7 standard drinks     Comment:  rarely (up to 4 beers a month)     Drug use: No     Sexual activity: Yes     Partners: Female     Comment: vasectomy   Lifestyle     Physical activity     Days per week: Not on file     Minutes per session: Not on file     Stress: Not on file   Relationships     Social connections     Talks on phone: Not on file     Gets together: Not on file     Attends [employer]ology service: Not on file     Active member of club or organization: Not on file     Attends meetings of clubs or organizations: Not on file     Relationship status: Not on file     Intimate partner violence     Fear of current or ex partner: Not on file     Emotionally abused: Not on file     Physically abused: Not on file     Forced sexual activity: Not on file   Other Topics Concern     Parent/sibling w/ CABG, MI or angioplasty before 65F 55M? Not Asked   Social History Narrative     Not on file       Current Outpatient Medications   Medication Sig Dispense Refill     allopurinol (ZYLOPRIM) 300 MG tablet TAKE 1 TABLET BY MOUTH EVERY DAY AND 1/2 TABLET BY MOUTH EVERY EVENING 135 tablet 0     buPROPion (WELLBUTRIN XL) 300 MG 24 hr tablet Take 1 tablet (300 mg) by mouth every morning 90 tablet 3      "cyclobenzaprine (FLEXERIL) 10 MG tablet Take 1 tablet (10 mg) by mouth 3 times daily as needed for muscle spasms 20 tablet 3     fenofibrate (TRIGLIDE/LOFIBRA) 160 MG tablet TAKE 1 TABLET DAILY 90 tablet 3     lisinopril (ZESTRIL) 10 MG tablet Take 1 tablet (10 mg) by mouth daily 90 tablet 3     multivitamin, therapeutic with minerals (MULTI-VITAMIN) TABS Take 1 tablet by mouth daily       Omega-3 Fatty Acids (OMEGA-3 FISH OIL PO) Take 1 capsule by mouth daily       polyethylene glycol (MIRALAX) powder Take 17 g (1 capful) by mouth daily 510 g 1     VITAMIN D, CHOLECALCIFEROL, PO Take by mouth daily         Allergies   Allergen Reactions     No Known Drug Allergies        REVIEW OF SYSTEMS:  CONSTITUTIONAL:  NEGATIVE for fever, chills, change in weight  INTEGUMENTARY/SKIN:  NEGATIVE for worrisome rashes, moles or lesions  EYES:  NEGATIVE for vision changes or irritation  ENT/MOUTH:  NEGATIVE for ear, mouth and throat problems  RESP:  NEGATIVE for significant cough or SOB  BREAST:  NEGATIVE for masses, tenderness or discharge  CV:  hypertension  GI:  reflux  :  Negative   MUSCULOSKELETAL:  See HPI above  NEURO:  NEGATIVE for weakness, dizziness or paresthesias  ENDOCRINE:  NEGATIVE for temperature intolerance, skin/hair changes  HEME/ALLERGY/IMMUNE:  NEGATIVE for bleeding problems  PSYCHIATRIC:  depression    PHYSICAL EXAM:  /84 (BP Location: Right arm, Patient Position: Chair, Cuff Size: Adult Large)   Ht 1.83 m (6' 0.05\")   Wt 122 kg (269 lb)   BMI 36.43 kg/m    Body mass index is 36.43 kg/m .   GENERAL APPEARANCE: healthy, alert and no distress   HEENT: No apparent thyroid megaly. Clear sclera with normal ocular movement  RESPIRATORY: No labored breathing  SKIN: no suspicious lesions or rashes  NEURO: Normal strength and tone, mentation intact and speech normal  VASCULAR: Good pulses, and capillary refill   LYMPH: no lymphadenopathy   PSYCH:  mentation appears normal and affect " normal/bright    MUSCULOSKELETAL:  Not in acute distress  Normal gait  Normal appearance of both upper extremities  No noticeable swelling in the upper extremities as well as wrists and hands  Full range of motion of the wrist that are symmetrical  Palpable pain on the radial aspect of the wrist joint but not the thumb CMC joint, left  No visible mass or swelling noted  No crepitus is noted  First dorsal compartment is not tender  Sensation is intact  Circulation is intact   strength is bilateral symmetrical     ASSESSMENT:  Chronic left wrist pain, exact etiology unclear  Possible underlying cysts that are not visible or palpable versus flexor carpi radialis tendinopathy      PLAN:  the overall examination findings are rather unremarkable.  However, because of the pain that is been going on since 8 to 9 months ago without any well identified diagnosis, we feel that MRI scan evaluation would be reasonable.  Potential causes for pain were discussed including cysts, tendinopathy versus possible AVN situation.  Manage the pain as best as he can in the meantime.  Follow-up once MRI scan is completed.    Imaging Interpretation:   Unremarkable      Stef Alexis MD  Department of Orthopedic Surgery        Disclaimer: This note consists of symbols derived from keyboarding, dictation and/or voice recognition software. As a result, there may be errors in the script that have gone undetected. Please consider this when interpreting information found in this chart.

## 2020-07-08 ENCOUNTER — ANCILLARY PROCEDURE (OUTPATIENT)
Dept: GENERAL RADIOLOGY | Facility: CLINIC | Age: 40
End: 2020-07-08
Attending: ORTHOPAEDIC SURGERY
Payer: COMMERCIAL

## 2020-07-08 ENCOUNTER — HOSPITAL ENCOUNTER (OUTPATIENT)
Dept: MRI IMAGING | Facility: CLINIC | Age: 40
Discharge: HOME | End: 2020-07-08
Attending: ORTHOPAEDIC SURGERY | Admitting: ORTHOPAEDIC SURGERY
Payer: COMMERCIAL

## 2020-07-08 ENCOUNTER — OFFICE VISIT (OUTPATIENT)
Dept: ORTHOPEDICS | Facility: CLINIC | Age: 40
End: 2020-07-08
Attending: NURSE PRACTITIONER
Payer: COMMERCIAL

## 2020-07-08 ENCOUNTER — TELEPHONE (OUTPATIENT)
Dept: PSYCHOLOGY | Facility: CLINIC | Age: 40
End: 2020-07-08

## 2020-07-08 VITALS
DIASTOLIC BLOOD PRESSURE: 84 MMHG | BODY MASS INDEX: 36.44 KG/M2 | HEIGHT: 72 IN | WEIGHT: 269 LBS | SYSTOLIC BLOOD PRESSURE: 136 MMHG

## 2020-07-08 DIAGNOSIS — M25.532 CHRONIC PAIN OF LEFT WRIST: ICD-10-CM

## 2020-07-08 DIAGNOSIS — M25.532 LEFT WRIST PAIN: ICD-10-CM

## 2020-07-08 DIAGNOSIS — G89.29 CHRONIC PAIN OF LEFT WRIST: ICD-10-CM

## 2020-07-08 DIAGNOSIS — G89.29 CHRONIC PAIN OF LEFT WRIST: Primary | ICD-10-CM

## 2020-07-08 DIAGNOSIS — M25.532 CHRONIC PAIN OF LEFT WRIST: Primary | ICD-10-CM

## 2020-07-08 PROCEDURE — 73110 X-RAY EXAM OF WRIST: CPT | Mod: LT | Performed by: ORTHOPAEDIC SURGERY

## 2020-07-08 PROCEDURE — 73221 MRI JOINT UPR EXTREM W/O DYE: CPT | Mod: LT

## 2020-07-08 PROCEDURE — 99203 OFFICE O/P NEW LOW 30 MIN: CPT | Performed by: ORTHOPAEDIC SURGERY

## 2020-07-08 ASSESSMENT — MIFFLIN-ST. JEOR: SCORE: 2158.97

## 2020-07-08 NOTE — TELEPHONE ENCOUNTER
Left patient a voice message stating that I am returning his call.    Cindy Castañeda PsyD LP 7/8/2020

## 2020-07-08 NOTE — TELEPHONE ENCOUNTER
"Received a message from the patient asking this writer to [facility] \"[facility]ression is linked to knee pain.\" Patient asked for a call back.     Plan to call patient back.     Cindy Castañeda PsyD LP 7/8/2020   "

## 2020-07-08 NOTE — PATIENT INSTRUCTIONS
The [facility] team will call you to schedule your imaging exam in the next 1-2 days. You can also call them directly at [facility] [phone] ([address], [address], [place], [place]) to schedule your appointment.      Schedule follow up appointment 1-2 days following MRI to discuss findings.     Call my office with any questions or concerns, [phone].

## 2020-07-08 NOTE — LETTER
7/8/2020         RE: Denny Herrera  [address] [facility] [id number]        Dear Colleague,    Thank you for referring your patient, Denny Herrera, to the [facility]. Please see a copy of my visit note below.    HISTORY OF PRESENT ILLNESS:    Denny Herrera is a 40 year old male who is seen in consultation at the request of Dr. Ahumada for left wrist pain.  Pain started December 2019. He reports stiffness of the wrists bilaterally. He notes rotating his wrist, and cracking the wrists for relief. He states one morning he felt a pop, and no improvement of pain. He recalls an injury to the wrist 20 years ago, he states he lifted his brother at that time, and felt a pop. Pain was present for 4 months.   Pain is located in the radial aspect of the wrist, deep within.   Patient worked previously as a , currently works in IT field- computer work.   [facility] to rule out [facility].     Present symptoms: Left radial sided wrist pain. Pain deep within the joint. Pain feels stiff, and sharp pains. Increased pain loading the joint, unable to do push ups. Patient reports sharp pain with lifting and carrying with the wrist in a ulnar deviated position. Patient reports acute swelling of the wrist, no obvious swelling.  Denies numbness and tingling of the wrist.  Current pain level: 2/10, Worst pain level: 8/10  Treatments tried to this point: ibuprofen, wrist brace, ibuprofen.   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 2 for R knee, 3 L knee, ACL and meniscus arthroscopic procedures x 3, 2 open surgery     COLONOSCOPY N/A 8/19/2015    Procedure: COLONOSCOPY;  Surgeon: Timbo Greenberg MD;  Location:  GI     HEMORRHOIDECTOMY EXTERNAL N/A 3/19/2019    Procedure: External hemorrhoidectomy;  Surgeon: Patria Roberosn MD;  Location: [facility] OR     ORTHOPEDIC SURGERY       OSTEOTOMY TIBIA Right 6/15/2016    Procedure: OSTEOTOMY TIBIA;  Surgeon: Tim Valdez MD;  [facility]: [facility]     REMOVE HARDWARE KNEE Right 6/15/2016    Procedure: REMOVE HARDWARE KNEE;  Surgeon: Tim Valdez MD;  Location: [facility]       Family History   Problem Relation Age of Onset     Family History Negative Other         neg for RA, IBD, psoriasis, does not know much about his father side of his family, neg for gout     Attention Deficit Disorder Other      Arthritis Mother         back DJD     Alcohol/Drug Mother      Depression Mother      Endocrine Disease Mother      Anxiety Disorder Mother      Back Pain Mother      Arthritis Brother         back DJD     Heart Disease Brother         MI at 40     Hypertension Brother      Bipolar Disorder Brother      Arthritis Maternal Grandmother         back DJD     Circulatory Maternal Grandmother      Eye Disorder Maternal Grandmother      Back Pain Maternal Grandmother      Cancer Maternal Aunt         ?type      Hypertension Brother      Circulatory Maternal Grandfather      Eye Disorder Maternal Grandfather      Hypertension Other         maternal grandparents     Depression Other      Alcohol/Drug Brother      Back Pain Brother      Heart Disease Father      Heart Disease Paternal Grandmother        Social History     Socioeconomic History     Marital status:      Spouse name: Not on file     Number of children: 1     Years of education: Not on file     Highest education level: Not on file   Occupational History     Occupation: IT work     Employer: [employer]     Employer: [employer]   Social Needs     Financial resource strain: Not on file     Food insecurity     Worry: Not on file     Inability: Not on file     Transportation needs     Medical: Not on file     Non-medical: Not on file   Tobacco Use     Smoking status: Never Smoker     Smokeless tobacco: Never Used     Tobacco comment: never smoked   Substance and Sexual Activity     Alcohol use: Yes     Alcohol/week: 0.0 - 1.7 standard drinks     Comment:  rarely (up to 4 beers a month)     Drug use: No     Sexual activity: Yes     Partners: Female     Comment: vasectomy   Lifestyle     Physical activity     Days per week: Not on file     Minutes per session: Not on file     Stress: Not on file   Relationships     Social connections     Talks on phone: Not on file     Gets together: Not on file     Attends Buddhism service: Not on file     Active member of club or organization: Not on file     Attends meetings of clubs or organizations: Not on file     Relationship status: Not on file     Intimate partner violence     Fear of current or ex partner: Not on file     Emotionally abused: Not on file     Physically abused: Not on file     Forced sexual activity: Not on file   Other Topics Concern     Parent/sibling w/ CABG, MI or angioplasty before 65F 55M? Not Asked   Social History Narrative     Not on file       Current Outpatient Medications   Medication Sig Dispense  "Refill     allopurinol (ZYLOPRIM) 300 MG tablet TAKE 1 TABLET BY MOUTH EVERY DAY AND 1/2 TABLET BY MOUTH EVERY EVENING 135 tablet 0     buPROPion (WELLBUTRIN XL) 300 MG 24 hr tablet Take 1 tablet (300 mg) by mouth every morning 90 tablet 3     cyclobenzaprine (FLEXERIL) 10 MG tablet Take 1 tablet (10 mg) by mouth 3 times daily as needed for muscle spasms 20 tablet 3     fenofibrate (TRIGLIDE/LOFIBRA) 160 MG tablet TAKE 1 TABLET DAILY 90 tablet 3     lisinopril (ZESTRIL) 10 MG tablet Take 1 tablet (10 mg) by mouth daily 90 tablet 3     multivitamin, therapeutic with minerals (MULTI-VITAMIN) TABS Take 1 tablet by mouth daily       Omega-3 Fatty Acids (OMEGA-3 FISH OIL PO) Take 1 capsule by mouth daily       polyethylene glycol (MIRALAX) powder Take 17 g (1 capful) by mouth daily 510 g 1     VITAMIN D, CHOLECALCIFEROL, PO Take by mouth daily         Allergies   Allergen Reactions     No Known Drug Allergies        REVIEW OF SYSTEMS:  CONSTITUTIONAL:  NEGATIVE for fever, chills, change in weight  INTEGUMENTARY/SKIN:  NEGATIVE for worrisome rashes, moles or lesions  EYES:  NEGATIVE for vision changes or irritation  ENT/MOUTH:  NEGATIVE for ear, mouth and throat problems  RESP:  NEGATIVE for significant cough or SOB  BREAST:  NEGATIVE for masses, tenderness or discharge  CV:  hypertension  GI:  reflux  :  Negative   MUSCULOSKELETAL:  See HPI above  NEURO:  NEGATIVE for weakness, dizziness or paresthesias  ENDOCRINE:  NEGATIVE for temperature intolerance, skin/hair changes  HEME/ALLERGY/IMMUNE:  NEGATIVE for bleeding problems  PSYCHIATRIC:  depression    PHYSICAL EXAM:  /84 (BP Location: Right arm, Patient Position: Chair, Cuff Size: Adult Large)   Ht 1.83 m (6' 0.05\")   Wt 122 kg (269 lb)   BMI 36.43 kg/m    Body mass index is 36.43 kg/m .   GENERAL APPEARANCE: healthy, alert and no distress   HEENT: No apparent thyroid megaly. Clear sclera with normal ocular movement  RESPIRATORY: No labored breathing  SKIN: " no suspicious lesions or rashes  NEURO: Normal strength and tone, mentation intact and speech normal  VASCULAR: Good pulses, and capillary refill   LYMPH: no lymphadenopathy   PSYCH:  mentation appears normal and affect normal/bright    MUSCULOSKELETAL:  Not in acute distress  Normal gait  Normal appearance of both upper extremities  No noticeable swelling in the upper extremities as well as wrists and hands  Full range of motion of the wrist that are symmetrical  Palpable pain on the radial aspect of the wrist joint but not the thumb CMC joint, left  No visible mass or swelling noted  No crepitus is noted  First dorsal compartment is not tender  Sensation is intact  Circulation is intact   strength is bilateral symmetrical     ASSESSMENT:  Chronic left wrist pain, exact etiology unclear  Possible underlying cysts that are not visible or palpable versus flexor carpi radialis tendinopathy      PLAN:  the overall examination findings are rather unremarkable.  However, because of the pain that is been going on since 8 to 9 months ago without any well identified diagnosis, we feel that MRI scan evaluation would be reasonable.  Potential causes for pain were discussed including cysts, tendinopathy versus possible AVN situation.  Manage the pain as best as he can in the meantime.  Follow-up once MRI scan is completed.    Imaging Interpretation:   Unremarkable      Stef Alexis MD  Department of Orthopedic Surgery        Disclaimer: This note consists of symbols derived from keyboarding, dictation and/or voice recognition software. As a result, there may be errors in the script that have gone undetected. Please consider this when interpreting information found in this chart.      Again, thank you for allowing me to participate in the care of your patient.        Sincerely,        Stef Alexis MD

## 2020-07-09 ENCOUNTER — TELEPHONE (OUTPATIENT)
Dept: ORTHOPEDICS | Facility: CLINIC | Age: 40
End: 2020-07-09

## 2020-07-09 NOTE — TELEPHONE ENCOUNTER
I contacted him on the phone today to go over the results of MRI scan of the wrist which was essentially unremarkable.  There is some subluxation of the ulnar-sided tendon which is not the area of his pain.  The main concern of possible ganglion cyst was not present.  Further observation and strengthening exercises recommended.  If he wants to try a formal physical therapy he can contact our office again.

## 2020-08-03 ENCOUNTER — TELEPHONE (OUTPATIENT)
Dept: PSYCHOLOGY | Facility: CLINIC | Age: 40
End: 2020-08-03

## 2020-08-03 NOTE — TELEPHONE ENCOUNTER
"8:50-9:07AM    Returned patient's call.     Spoke with patient about [facility]. Informed patient that he can request a copy of his medical records from [facility] or I can provide him with a letter that includes dates of service, diagnostic impressions, and information obtained in the interview from the patient himself that is relevant. Reported that the relevant sentence is \"Depression started about 8 years ago when he had his first child and had knee surgery. Client reported that depression symptoms began in about 2010.\"    Patient also said that his Bupropion was increased to help with weight management symptoms. He stated that he has not seen a change. Encouraged him to talk with his PCP about his concerns and to consider asking if he is appropriate for a referral to the [facility] to help with medical weight management.     Patient reported that he is continuing to struggle with distractions and productivity, which have worsened since he has started working from home. He said that his children are \"constantly interrupting\" and he feels \"scatter brained working from home.\" Patient reported that he is not currently in therapy. Recommended that patient attend therapy to assist with depression and concentration issues.     Referrals:  [facility] ([phone])  [facility] [phone]      [facility] [phone]    [facility] [phone]    Cindy Castañeda PsyD LP 8/3/2020   "

## 2020-08-31 ENCOUNTER — VIRTUAL VISIT (OUTPATIENT)
Dept: FAMILY MEDICINE | Facility: OTHER | Age: 40
End: 2020-08-31
Payer: COMMERCIAL

## 2020-08-31 PROCEDURE — 99421 OL DIG E/M SVC 5-10 MIN: CPT | Performed by: FAMILY MEDICINE

## 2020-09-01 DIAGNOSIS — Z20.822 SUSPECTED COVID-19 VIRUS INFECTION: ICD-10-CM

## 2020-09-01 DIAGNOSIS — Z20.822 SUSPECTED COVID-19 VIRUS INFECTION: Primary | ICD-10-CM

## 2020-09-01 PROCEDURE — U0003 INFECTIOUS AGENT DETECTION BY NUCLEIC ACID (DNA OR RNA); SEVERE ACUTE RESPIRATORY SYNDROME CORONAVIRUS 2 (SARS-COV-2) (CORONAVIRUS DISEASE [COVID-19]), AMPLIFIED PROBE TECHNIQUE, MAKING USE OF HIGH THROUGHPUT TECHNOLOGIES AS DESCRIBED BY CMS-2020-01-R: HCPCS | Performed by: FAMILY MEDICINE

## 2020-09-01 NOTE — PROGRESS NOTES
"Date: 2020 15:57:18  Clinician: Timbo Valdez  Clinician NPI: [id number]  Patient: Denny Herrera  Patient : 1980  Patient Address: [address] [place] [id number]  Patient Phone: [phone]  Visit Protocol: URI  Patient Summary:  Denny is a 40 year old ( : 1980 ) male who initiated a Visit for COVID-19 (Coronavirus) evaluation and screening. When asked the question \"Please sign me up to receive news, health information and promotions from [employer].\", Denny responded \"No\".    Denny states his symptoms started 1-2 days ago.   His symptoms consist of a headache, chills, malaise, a sore throat, a cough, nasal congestion, myalgia, and facial pain or pressure. Denny also feels feverish.   Symptom details     Nasal secretions: The color of his mucus is clear.    Cough: Denny coughs a few times an hour and his cough is not more bothersome at night. Phlegm does not come into his throat when he coughs. He does not believe his cough is caused by post-nasal drip.     Sore throat: Denny reports having mild throat pain (1-3 on a 10 point pain scale), does not have exudate on his tonsils, and can swallow liquids. He is not sure if the lymph nodes in his neck are enlarged. A rash has not appeared on the skin since the sore throat started.     Temperature: His current temperature is 99.7 degrees Fahrenheit.     Facial pain or pressure: The facial pain or pressure feels worse when bending over or leaning forward.     Headache: He states the headache is mild (1-3 on a 10 point pain scale).      Denny denies having ear pain, wheezing, teeth pain, ageusia, diarrhea, vomiting, rhinitis, nausea, and anosmia. He also denies having a sinus infection within the past year, having recent facial or sinus surgery in the past 60 days, and taking antibiotic medication in the past month. He is not experiencing dyspnea.   Precipitating events  Within the past week, Denny has not been exposed to someone with strep " throat. He has not recently been exposed to someone with influenza. Denny has been in close contact with the following high risk individuals: children under the age of 5.   Pertinent COVID-19 (Coronavirus) information  In the past 14 days, Denny has not worked in a congregate living setting.   He does not work or volunteer as healthcare worker or a  and does not work or volunteer in a healthcare facility.   Denny also has not lived in a congregate living setting in the past 14 days. He does not live with a healthcare worker.   Denny has not had a close contact with a laboratory-confirmed COVID-19 patient within 14 days of symptom onset.   Since December 2019, Denny and has had upper respiratory infection (URI) or influenza-like illness. Has not been diagnosed with lab-confirmed COVID-19 test      Date(s) of previous URI or influenza-like illness (free-text): Feb 17-24     Symptoms Denny experienced during previous URI or influenza-like illness as reported by the patient (free-text): Pneumonia        Pertinent medical history  Denny does not need a return to work/school note.   Weight: 265 lbs   Denny does not smoke or use smokeless tobacco.   Weight: 265 lbs    MEDICATIONS: bupropion HCl oral, fenofibrate oral, allopurinol oral, lisinopril oral, ALLERGIES: NKDA  Clinician Response:  Dear Denny,   Your symptoms show that you may have coronavirus (COVID-19). This illness can cause fever, cough and trouble breathing. Many people get a mild case and get better on their own. Some people can get very sick.  What should I do?  We would like to test you for this virus.   1. Please call [phone] to schedule your visit. Explain that you were referred by [facility] to have a COVID-19 test. Be ready to share your [facility] visit ID number.  The following will serve as your written order for this COVID Test, ordered by me, for the indication of suspected COVID [Z20.828]: The test will be ordered in [facility], our  "electronic health record, after you are scheduled. It will show as ordered and authorized by Ahmet Perez MD.  Order: COVID-19 (Coronavirus) PCR for SYMPTOMATIC testing from [facility].      2. When it's time for your COVID test:  Stay at least 6 feet away from others. (If someone will drive you to your test, stay in the backseat, as far away from the  as you can.)   Cover your mouth and nose with a mask, tissue or washcloth.  Go straight to the testing site. Don't make any stops on the way there or back.      3.Starting now: Stay home and away from others (self-isolate) until:   You've had no fever---and no medicine that reduces fever---for one full day (24 hours). And...   Your other symptoms have gotten better. For example, your cough or breathing has improved. And...   At least 10 days have passed since your symptoms started.       During this time, don't leave the house except for testing or medical care.   Stay in your own room, even for meals. Use your own bathroom if you can.   Stay away from others in your home. No hugging, kissing or shaking hands. No visitors.  Don't go to work, school or anywhere else.    Clean \"high touch\" surfaces often (doorknobs, counters, handles, etc.). Use a household cleaning spray or wipes. You'll find a full list of  on the EPA website: www.epa.gov/pesticide-registration/list-n-disinfectants-use-against-sars-cov-2.   Cover your mouth and nose with a mask, tissue or washcloth to avoid spreading germs.  Wash your hands and face often. Use soap and water.  Caregivers in these groups are at risk for severe illness due to COVID-19:  o People 65 years and older  o People who live in a nursing home or long-term care facility  o People with chronic disease (lung, heart, cancer, diabetes, kidney, liver, immunologic)  o People who have a weakened immune system, including those who:   Are in cancer treatment  Take medicine that weakens the immune system, such as corticosteroids  " Had a bone marrow or organ transplant  Have an immune deficiency  Have poorly controlled HIV or AIDS  Are obese (body mass index of 40 or higher)  Smoke regularly   o Caregivers should wear gloves while washing dishes, handling laundry and cleaning bedrooms and bathrooms.  o Use caution when washing and drying laundry: Don't shake dirty laundry, and use the warmest water setting that you can.  o For more tips, go to www.cdc.gov/coronavirus/2019-ncov/downloads/10Things.pdf.    4.Sign up for [facility]. We know it's scary to hear that you might have COVID-19. We want to track your symptoms to make sure you're okay over the next 2 weeks. Please look for an email from [facility] [employer]---this is a free, online program that we'll use to keep in touch. To sign up, follow the link in the email. Learn more at [email or web address]  How can I take care of myself?   Get lots of rest. Drink extra fluids (unless a doctor has told you not to).   Take Tylenol (acetaminophen) for fever or pain. If you have liver or kidney problems, ask your family doctor if it's okay to take Tylenol.   Adults can take either:    650 mg (two 325 mg pills) every 4 to 6 hours, or...   1,000 mg (two 500 mg pills) every 8 hours as needed.    Note: Don't take more than 3,000 mg in one day. Acetaminophen is found in many medicines (both prescribed and over-the-counter medicines). Read all labels to be sure you don't take too much.   For children, check the Tylenol bottle for the right dose. The dose is based on the child's age or weight.    If you have other health problems (like cancer, heart failure, an organ transplant or severe kidney disease): Call your specialty clinic if you don't feel better in the next 2 days.       Know when to call 911. Emergency warning signs include:    Trouble breathing or shortness of breath Pain or pressure in the chest that doesn't go away Feeling confused like you haven't felt before, or not being able to wake  up Bluish-colored lips or face.  Where can I get more information?   [facility] -- About COVID-19: [email or web address]   CDC -- What to Do If You're Sick: www.cdc.gov/coronavirus/2019-ncov/about/steps-when-sick.html   [facility] -- Ending Home Isolation: www.cdc.gov/coronavirus/2019-ncov/hcp/disposition-in-home-patients.html   [facility] -- Caring for Someone: www.cdc.gov/coronavirus/2019-ncov/if-you-are-sick/care-for-someone.html   [facility] -- Interim Guidance for Hospital Discharge to Home: www.[facility].[place]/diseases/coronavirus/hcp/hospdischarge.pdf   [facility] clinical trials (COVID-19 research studies): clinicalaffairs.[facility]-clinical-trials    Below are the COVID-19 hotlines at the [place] Department of [facility] ([facility]). Interpreters are available.    For health questions: Call 704-893-3429 or 1-426.904.9266 (7 a.m. to 7 p.m.) For questions about schools and childcare: Call 906-109-3188 or 1-587.988.9650 (7 a.m. to 7 p.m.)    Diagnosis: Other malaise  Diagnosis ICD: R53.81

## 2020-09-02 LAB
SARS-COV-2 RNA SPEC QL NAA+PROBE: NOT DETECTED
SPECIMEN SOURCE: NORMAL

## 2020-09-05 ENCOUNTER — OFFICE VISIT (OUTPATIENT)
Dept: URGENT CARE | Facility: URGENT CARE | Age: 40
End: 2020-09-05
Payer: COMMERCIAL

## 2020-09-05 ENCOUNTER — NURSE TRIAGE (OUTPATIENT)
Dept: NURSING | Facility: CLINIC | Age: 40
End: 2020-09-05

## 2020-09-05 ENCOUNTER — VIRTUAL VISIT (OUTPATIENT)
Dept: URGENT CARE | Facility: CLINIC | Age: 40
End: 2020-09-05
Payer: COMMERCIAL

## 2020-09-05 DIAGNOSIS — R50.9 FEVER, UNSPECIFIED: Primary | ICD-10-CM

## 2020-09-05 DIAGNOSIS — B34.9 VIRAL SYNDROME: Primary | ICD-10-CM

## 2020-09-05 PROCEDURE — 99213 OFFICE O/P EST LOW 20 MIN: CPT | Performed by: PHYSICIAN ASSISTANT

## 2020-09-05 PROCEDURE — 99207 ZZC NO BILLABLE SERVICE THIS VISIT: CPT | Mod: 95 | Performed by: FAMILY MEDICINE

## 2020-09-05 NOTE — TELEPHONE ENCOUNTER
Fatigue, headache, stomach pain, fever, cough x 6 days, cough seems to be getting worse.  Pt taking ES Tylenol.  No CP, SOB.  Pt eating and drinking fluids, urinating.  COVID test negative.     Disposition: Virtual visit within 24 hours, telephone visit advised.  She verbalized understanding and had no further questions, call transferred to central scheduling.     COVID 19 Nurse Triage Plan/Patient Instructions    Please be aware that novel coronavirus (COVID-19) may be circulating in the community. If you develop symptoms such as fever, cough, or SOB or if you have concerns about the presence of another infection including coronavirus (COVID-19), please contact your health care provider or visit [email or web address].     Disposition/Instructions    Virtual Visit with provider recommended. Reference Visit Selection Guide.    Thank you for taking steps to prevent the spread of this virus.  o Limit your contact with others.  o Wear a simple mask to cover your cough.  o Wash your hands well and often.    Resources    [facility]: About COVID-19: [email or web address]    CDC: What to Do If You're Sick: www.cdc.gov/coronavirus/2019-ncov/about/steps-when-sick.html    CDC: Ending Home Isolation: www.cdc.gov/coronavirus/2019-ncov/hcp/disposition-in-home-patients.html     CDC: Caring for Someone: www.cdc.gov/coronavirus/2019-ncov/if-you-are-sick/care-for-someone.html     [facility]: Interim Guidance for Hospital Discharge to Home: www.health.[facility].[place]/diseases/coronavirus/hcp/hospdischarge.pdf    [facility] clinical trials (COVID-19 research studies): [email or web address].[email or web address]/umn-clinical-trials     Below are the COVID-19 hotlines at the [place] Department of Health ([facility]). Interpreters are available.   o For health questions: Call 227-419-2092 or 1-542.364.5474 (7 a.m. to 7 p.m.)  o For questions about schools and childcare: Call 728-728-1717 or 1-403.967.5816 (7 a.m. to 7 p.m.)       Maryann Perkins  RN/FNA    Reason for Disposition    Fever present > 3 days (72 hours)    Additional Information    Negative: SEVERE difficulty breathing (e.g., struggling for each breath, speaks in single words)    Negative: Shock suspected (e.g., cold/pale/clammy skin, too weak to stand, low BP, rapid pulse)    Negative: Sounds like a life-threatening emergency to the triager    Negative: Difficult to awaken or acting confused (e.g., disoriented, slurred speech)    Negative: Bluish (or gray) lips or face now    Negative: SEVERE or constant chest pain or pressure (Exception: mild central chest pain, present only when coughing)    Negative: MODERATE difficulty breathing (e.g., speaks in phrases, SOB even at rest, pulse 100-120)    Negative: Patient sounds very sick or weak to the triager    Negative: MILD difficulty breathing (e.g., minimal/no SOB at rest, SOB with walking, pulse <100)    Negative: Chest pain or pressure    Negative: Fever > 103 F (39.4 C)    Negative: [1] Fever > 101 F (38.3 C) AND [2] age > 60    Negative: [1] Fever > 100.0 F (37.8 C) AND [2] bedridden (e.g., nursing home patient, CVA, chronic illness, recovering from surgery)    Negative: HIGH RISK patient (e.g., age > 64 years, diabetes, heart or lung disease, weak immune system) (Exception: Has already been evaluated by healthcare provider and has no new or worsening symptoms)    Protocols used: CORONAVIRUS (COVID-19) DIAGNOSED OR [id number] 8.4.20

## 2020-09-05 NOTE — PROGRESS NOTES
"Denny Herrera is a 40 year old male who is being evaluated via a billable telephone visit.      The patient has been notified of following:     \"This telephone visit will be conducted via a call between you and your physician/provider. We have found that certain health care needs can be provided without the need for a physical exam.  This service lets us provide the care you need with a short phone conversation.  If a prescription is necessary we can send it directly to your pharmacy.  If lab work is needed we can place an order for that and you can then stop by our lab to have the test done at a later time.    Telephone visits are billed at different rates depending on your insurance coverage. During this emergency period, for some insurers they may be billed the same as an in-person visit.  Please reach out to your insurance provider with any questions.    If during the course of the call the physician/provider feels a telephone visit is not appropriate, you will not be charged for this service.\"    Patient has given verbal consent for Telephone visit?  Yes    What phone number would you like to be contacted at?      How would you like to obtain your AVS? MyChart    Subjective     Denny Herrera is a 40 year old male who presents with 7 days of low grade fever, Fatigue, headache, stomach mild bloated type/pain/upset, cough x 6 days, cough seems to be getting worse.  Pt taking ES Tylenol.  No CP, SOB.  Pt eating and drinking fluids, urinating. Passing stools normally.   COVID test negative.     No exposure known. No travel. No known tick bites.     The patient has a history of prediabetes.   HPI    Review of Systems   No rash, pulse ox has been always >96%. Pulse 88 not above 100.        Objective      Vitals:  No vitals were obtained today due to virtual visit.  Does not sound shortness of breath on the phone.     Remainder of exam unable to be completed due to telephone visits          Assessment/Plan:    " ICD-10-CM    1. Fever, unspecified  R50.9     ddx includes pneumonia v covid v other. discussed empiric treatment for pneumonia v having sim seen for in person evaluation. Given his persistent fevers he would like to be seen in . They may consider lab studies v cxr v if he warrants repeat covid testing. He plans on going to [employer]    Phone call duration:  9 minutes

## 2020-09-05 NOTE — PATIENT INSTRUCTIONS
Patient Education     Coronavirus Disease 2019 (COVID-19): Caring for Yourself or Others   If you or a household member have symptoms of COVID-19, follow the guidelines below. This will help you manage symptoms and keep the virus from spreading.  If you have symptoms of COVID-19    Stay home and contact your care team. They will tell you what to do    Don t panic. Keep in mind that other illnesses can cause similar symptoms.    Stay away from work, school, and public places.    Limit physical contact with others in your home. Limit visitors. No kissing.    Clean surfaces you touch with disinfectant.    If you need to cough or sneeze, do it into a tissue. Then, throw the tissue into the trash. If you don't have tissues, cough or sneeze into the bend of your elbow.    Don t share food or personal items with people in your household. This includes items like eating and drinking utensils, towels, and bedding.    Wear a cloth face mask around other people. It should cover your nose and mouth. You may need to make your own mask using a bandana, T-shirt, or other cloth. See the CDC s instructions on how to make a mask.    If you need to go to a hospital or clinic, call ahead to let them know. Expect the care team to wear masks, gowns, gloves, and eye protection. You may be put in a separate room.    Follow all instructions from your care team.    If you have been diagnosed with COVID-19    Stay home and away from others, including other people in your home. (This is called self-isolation.) Don t leave home unless you need to get medical care. Don't go to work, school, or public places. Don't use buses, taxis, or other public transportation.    Follow all instructions from your care team.    If you need to go to a hospital or clinic, call ahead to let them know. Expect the care team to wear masks, gowns, gloves, and eye protection. You may be put in a separate room.    Wear a face mask over your nose and mouth. This is to  protect others from your germs. If you can t wear a mask, your caregivers should wear one. You may need to make your own mask using a bandana, T-shirt, or other cloth. See the CDC s instructions on how to make a mask.    Have no contact with pets and other animals.    Don t share food or personal items with people in your household. This includes items like eating and drinking utensils, towels, and bedding.    Don t share food or personal items with people in your household. This includes items like eating and drinking utensils, towels, and bedding.    If you need to cough or sneeze, do it into a tissue. Then, throw the tissue into the trash. If you don't have tissues, cough or sneeze into the bend of your elbow.    Wash your hands often.    Self-care at home   At this time, there is no medicine approved to prevent or treat COVID-19. Experts are testing different medicines, trying to find one that works.  So far, the most proven treatment is to support your body while it fights the virus.    Get extra rest.    Drink extra fluids (6 to 8 glasses of liquids each day), unless a doctor has told you not to. Ask your care team which fluids are best for you. Avoid fluids that contain caffeine or alcohol.    Taking over-the-counter (OTC) pain medicine to reduce pain and fever. Your care team will tell you which medicine to use.  If you ve been in the hospital for COVID-19, follow your care team s instructions. They will tell you when to stop self-isolation. They may also have you change positions to help your breathing (such as lying on your belly).  If a test showed that you have COVID-19, you may be asked to donate plasma after you ve recovered. (This is called COVID-19 convalescent plasma donation.) The plasma may contain antibodies to help fight the virus in others. Scientists are testing whether this might be a treatment in the future. For more information, talk to your care team.  Caring for a sick person     Follow  all instructions from the care team.    Wash your hands often.    Wear protective clothing as advised.    Make sure the sick person wears a mask. If they can't wear a mask, don't stay in the same room with them. If you must be in the same room, wear a face mask. Make sure the mask covers both the nose and mouth.    Keep track of the sick person s symptoms.    Clean surfaces often with disinfectant. This includes phones, kitchen counters, fridge door handles, bathroom surfaces, and others.    Don t let anyone share household items with the sick person. This includes eating and drinking tools, towels, sheets, and blankets.    Clean fabrics and laundry well.    Keep other people and pets away from the sick person.    When you can stop self-isolation  When you are sick with COVID-19, you should stay away from other people. This is called self-isolation. The rules for ending self-isolation depend on your health in general.  If you are normally healthy  You can stop self-isolation when all 3 of these are true:  1. You ve had no fever--and no medicine that reduces fever--for at least 24 hours. And   2. Your symptoms are better, such as cough or trouble breathing. And   3. At least 10 days have passed since your symptoms first started.  Talk with your care team before you leave home. They may tell you it s okay to leave, or they may give you different advice.  If you have a weak immune system  If you re being treated for cancer, have an immune disorder (such as HIV), or have had a transplant (organ or bone marrow), follow your care team s instructions. You may be able to end self-isolation when all 3 of these are true:  1. You have no fever without fever-reducing medicine. And   2. Your symptoms are better, such as cough or trouble breathing. And   3. You ve had 2 tests for COVID-19. The tests happened at least 24 hours apart, and both show that you don t have the virus. (If no tests are available, your care team may tell  you to follow the rules for normally healthy people above.)  When you return to public settings  When you are well enough to go outside your home, follow the CDC s guidance on cloth face masks.    Anyone over age 2 should wear a face mask in public, especially when it's hard to socially distance. This includes public transit, public protests and marches, and crowded stores, bars, and restaurants.    Face masks are most likely to reduce the spread of COVID-19 when they are widely used by people who are out in the public.  Certain people should not wear a face covering. These include:     Children under 2 years old    Anyone with a health, developmental, or mental health condition that can be made worse by wearing a mask    Anyone who is unconscious or unable to remove the face covering without help. See the CDC's guidance on who should not wear a face mask.  When to call your care team  Call your care team right away if a sick person has any of these:    Trouble breathing    Pain or pressure in chest  If a sick person has any of these, call 911:    Trouble breathing that gets worse    Pain or pressure in chest that gets worse    Blue tint to lips or face    Fast or irregular heartbeat    Confusion or trouble waking    Fainting or loss of consciousness    Coughing up blood  Going home from the hospital   If you have COVID-19 and were recently in the hospital:    Follow the instructions above for self-care and isolation.    Follow the hospital care team s instructions.    Ask questions if anything is unclear to you. Write down answers so you remember them.  Date last modified: 8/12/2020  [employer] last reviewed this educational content on 4/1/2020 2000-2020 [employer]. [address] [place] [address], [place] [address]. All rights reserved. This information is not intended as a substitute for professional medical care. Always follow your healthcare professional's instructions.

## 2020-09-06 VITALS — WEIGHT: 269 LBS | BODY MASS INDEX: 36.43 KG/M2 | OXYGEN SATURATION: 98 % | HEART RATE: 80 BPM | TEMPERATURE: 98.1 F

## 2020-09-06 NOTE — PROGRESS NOTES
SUBJECTIVE:   Denny Herrera is a 40 year old male presenting with a chief complaint of Severe headache, sweating, coughing, muscle aches, low grade 99.7 fever, stomache aches x 6 days. Pt got COVID test that came back negative on 09/01/2020Severe headache, sweating, coughing, muscle aches, low grade 99.7 fever, stomache aches x 6 days. Pt got COVID test that came back negative on 09/01/2020.    No CHest pain, severe headache, shortness of breath.      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     Raynaud disease      Raynaud's syndrome 3/14/2014     Current Outpatient Medications   Medication Sig Dispense Refill     allopurinol (ZYLOPRIM) 300 MG tablet TAKE 1 TABLET BY MOUTH EVERY DAY AND 1/2 TABLET BY MOUTH EVERY EVENING 135 tablet 0     buPROPion (WELLBUTRIN XL) 300 MG 24 hr tablet Take 1 tablet (300 mg) by mouth every morning 90 tablet 3     cyclobenzaprine (FLEXERIL) 10 MG tablet Take 1 tablet (10 mg) by mouth 3 times daily as needed for muscle spasms 20 tablet 3     fenofibrate (TRIGLIDE/LOFIBRA) 160 MG tablet TAKE 1 TABLET DAILY 90 tablet 3     lisinopril (ZESTRIL) 10 MG tablet Take 1 tablet (10 mg) by mouth daily 90 tablet 3     multivitamin, therapeutic with minerals (MULTI-VITAMIN) TABS Take 1 tablet by mouth daily       Omega-3 Fatty Acids (OMEGA-3 FISH OIL PO) Take 1 capsule by mouth daily       polyethylene glycol (MIRALAX) powder Take 17 g (1 capful) by mouth daily 510 g 1     VITAMIN D, CHOLECALCIFEROL, PO Take by mouth daily       Social History     Tobacco Use      Smoking status: Never Smoker     Smokeless tobacco: Never Used     Tobacco comment: never smoked   Substance Use Topics     Alcohol use: Yes     Alcohol/week: 0.0 - 1.7 standard drinks     Comment:  rarely (up to 4 beers a month)       ROS:  10 point ROS negative except as listed above      OBJECTIVE:  Pulse 80   Temp 98.1  F (36.7  C)   Wt 122 kg (269 lb)   SpO2 98%   BMI 36.43 kg/m    GENERAL APPEARANCE: healthy, alert and no distress  EYES: EOMI,  PERRL, conjunctiva clear  HENT: ear canals and TM's normal.  Nose and mouth without ulcers, erythema or lesions  NECK: supple, nontender, no lymphadenopathy  RESP: lungs clear to auscultation - no rales, rhonchi or wheezes  CV: regular rates and rhythm, normal S1 S2, no murmur noted  ABDOMEN:  soft, nontender, no HSM or masses and bowel sounds normal  NEURO: Normal strength and tone, sensory exam grossly normal,  normal speech and mentation  SKIN: no suspicious lesions or rashes    ASSESSMENT:  (B34.9) Viral syndrome  (primary encounter diagnosis)  Comment: advised COVID test may be false negative, to behave as though covid positive and follow up with red flag symptoms. No evidence of pneumonia on presentation  Plan: No severe headache, chest pain, shortness of breath  Rest, isolate for 10 days, hydrate, test, follow up if worsening or new symptoms  HH members to isolate until test results, if positive isolate for 2 weeks and follow up for testing if symptoms occur

## 2020-09-23 ENCOUNTER — VIRTUAL VISIT (OUTPATIENT)
Dept: FAMILY MEDICINE | Facility: OTHER | Age: 40
End: 2020-09-23
Payer: COMMERCIAL

## 2020-09-23 DIAGNOSIS — Z20.822 SUSPECTED 2019 NOVEL CORONAVIRUS INFECTION: Primary | ICD-10-CM

## 2020-09-23 PROCEDURE — 99421 OL DIG E/M SVC 5-10 MIN: CPT | Performed by: NURSE PRACTITIONER

## 2020-09-23 NOTE — PROGRESS NOTES
"Date: 2020 07:20:00  Clinician: Fernanda Matamoros  Clinician NPI: [id number]  Patient: Denny Herrera  Patient : 1980  Patient Address: [address] [place] [id number]  Patient Phone: [phone]  Visit Protocol: URI  Patient Summary:  Denny is a 40 year old ( : 1980 ) male who initiated a [facility] Visit for COVID-19 (Coronavirus) evaluation and screening. When asked the question \"Please sign me up to receive news, health information and promotions from [facility].\", Denny responded \"No\".    Denny states his symptoms started suddenly 2-3 weeks ago. After his symptoms started, they improved and then got worse again.   His symptoms consist of malaise, facial pain or pressure, a sore throat, a cough, nasal congestion, a headache, rhinitis, nausea, and myalgia. Denny also feels feverish.   Symptom details     Nasal secretions: The color of his mucus is yellow, blood-tinged, and white.    Cough: Denny coughs a few times an hour and his cough is not more bothersome at night. Phlegm comes into his throat when he coughs. He believes his cough is caused by post-nasal drip. The color of the phlegm is white, blood-tinged, and clear.     Sore throat: Denny reports having moderate throat pain (4-6 on a 10 point pain scale), does not have exudate on his tonsils, and can swallow liquids. The lymph nodes in his neck are not enlarged. A rash has not appeared on the skin since the sore throat started.     Temperature: His current temperature is 99.7 degrees Fahrenheit.     Facial pain or pressure: The facial pain or pressure does not feel worse when bending or leaning forward.     Headache: He states the headache is moderate (4-6 on a 10 point pain scale).      Denny denies having chills, teeth pain, ageusia, diarrhea, ear pain, anosmia, vomiting, wheezing, and enlarged lymph nodes. He also denies having a sinus infection within the past year, taking antibiotic medication in the past month, and having " recent facial or sinus surgery in the past 60 days. He is not experiencing dyspnea.   Precipitating events  Within the past week, Denny has not been exposed to someone with strep throat. He has not recently been exposed to someone with influenza. Denny has been in close contact with the following high risk individuals: children under the age of 5 and adults 65 or older.   Pertinent COVID-19 (Coronavirus) information  In the past 14 days, Denny has not worked in a congregate living setting.   He does not work or volunteer as healthcare worker or a  and does not work or volunteer in a healthcare facility.   Denny also has not lived in a congregate living setting in the past 14 days. He does not live with a healthcare worker.   Denny has not had a close contact with a laboratory-confirmed COVID-19 patient within 14 days of symptom onset.   Since December 2019, Denny and has had upper respiratory infection (URI) or influenza-like illness. Has not been diagnosed with lab-confirmed COVID-19 test      Date(s) of previous URI or influenza-like illness (free-text): february     Symptoms Denny experienced during previous URI or influenza-like illness as reported by the patient (free-text): Cough, headache, fever, pneumonia        Pertinent medical history  Denny does not need a return to work/school note.   Weight: 265 lbs   Denny does not smoke or use smokeless tobacco.   Additional information as reported by the patient (free text): I was sick, same symptoms 3 weeks ago, lasted almost 10 days. I was self quarantined. I came out of quarantine from bedroom after no symptoms for 24 hrs. 3 days later we had small group gathering with 2 people over 65 and younger family. 7 days later 7 of us all got symptoms within 24 hrs. So far I was the worst.   Weight: 265 lbs    MEDICATIONS: bupropion HCl oral, fenofibrate oral, allopurinol oral, lisinopril oral, ALLERGIES: NKDA  Clinician Response:  Dear Denny,  Based on the  information provided, you have acute bacterial sinusitis, also known as a sinus infection. Sinus infections are caused by bacteria or a virus and symptoms are almost always identical. The difference between the 2 types of infections is timing.  Sinus infections start as viral infections and symptoms improve on their own in about 7 days. If symptoms have not improved after 7 days or have even worsened, a bacterial infection may have developed.  Medication information  I am prescribing:       Fluticasone 50 mcg/actuation nasal spray. Inhale 2 sprays in each nostril 1 time per day; after 1 week, may adjust to 1 - 2 sprays in each nostril 1 time per day. This medication takes several days to start working, so keep taking it even if it doesn't help right away. There are no refills with this prescription.      Amoxicillin-pot clavulanate 875-125 mg oral tablet. Take 1 tablet by mouth every 12 hours for 10 days. There are no refills with this prescription.     Unless you are allergic to the over-the-counter medication(s) below, I recommend using:     An antihistamine such as Allegra, Claritin, Zyrtec, or store brand. Please follow the instructions on the package.    A decongestant such as Sudafed PE or store brand.      Oxymetazoline (Afrin or store brand) nasal spray. Use 1 spray in each nostril 2 times a day for a maximum of 3 days. Using this medication more frequently or longer than recommended may cause nasal congestion to reoccur or worsen. Sinus pressure occurs when the tissues lining your sinuses become swollen and inflamed. Afrin nasal spray decreases the swelling to provide the quickest and most effective relief from sinus pressure. Similar to Flonase, Afrin will help reduce swelling in your sinuses. Afrin works differently than Flonase, so be sure to use both nasal sprays.      Over-the-counter medications do not require a prescription. Ask the pharmacist if you have any questions.  Self care  Steps you can take  to be as comfortable as possible:     Rest.    Drink plenty of fluids.    Take a warm shower to loosen congestion    Use a cool-mist humidifier.    Use throat lozenges.    Suck on frozen items such as popsicles.    Drink hot tea with lemon and honey.    Gargle with warm salt water (1/4 teaspoon of salt per 8 ounce glass of water).    Take a spoonful of honey to reduce your cough.     When to seek care  Please be seen in a clinic or urgent care if any of the following occur:     New symptoms develop, or symptoms become worse    Symptoms do not start to improve after 3 days of treatment     Call ahead before going to the clinic or urgent care.  It is possible to have an allergic reaction to an antibiotic even if you have not had one in the past. If you notice a new rash, significant swelling, or difficulty breathing, stop taking this medication immediately and go to a clinic or urgent care.  Call 911 or go to the emergency room if you feel that your throat is closing off, you suddenly develop a rash, you are unable to swallow fluids, you are drooling, or you are having difficulty breathing.  Additional treatment plan   Your symptoms show that you may have coronavirus (COVID-19). This illness can cause fever, cough and trouble breathing. Many people get a mild case and get better on their own. Some people can get very sick.  What should I do?  We would like to test you for this virus.   1. Please call [phone] to schedule your visit. Explain that you were referred by [facility] to have a COVID-19 test. Be ready to share your [facility] visit ID number.  The following will serve as your written order for this COVID Test, ordered by me, for the indication of suspected COVID [Z20.828]: The test will be ordered in [employer], our electronic health record, after you are scheduled. It will show as ordered and authorized by Ahmet Perez MD.  Order: COVID-19 (Coronavirus) PCR for SYMPTOMATIC testing from [facility].      2. When it's time  "for your COVID test:  Stay at least 6 feet away from others. (If someone will drive you to your test, stay in the backseat, as far away from the  as you can.)   Cover your mouth and nose with a mask, tissue or washcloth.  Go straight to the testing site. Don't make any stops on the way there or back.      3.Starting now: Stay home and away from others (self-isolate) until:   You've had no fever---and no medicine that reduces fever---for one full day (24 hours). And...   Your other symptoms have gotten better. For example, your cough or breathing has improved. And...   At least 10 days have passed since your symptoms started.       During this time, don't leave the house except for testing or medical care.   Stay in your own room, even for meals. Use your own bathroom if you can.   Stay away from others in your home. No hugging, kissing or shaking hands. No visitors.  Don't go to work, school or anywhere else.    Clean \"high touch\" surfaces often (doorknobs, counters, handles, etc.). Use a household cleaning spray or wipes. You'll find a full list of  on the EPA website: www.epa.gov/pesticide-registration/list-n-disinfectants-use-against-sars-cov-2.   Cover your mouth and nose with a mask, tissue or washcloth to avoid spreading germs.  Wash your hands and face often. Use soap and water.  Caregivers in these groups are at risk for severe illness due to COVID-19:  o People 65 years and older  o People who live in a nursing home or long-term care facility  o People with chronic disease (lung, heart, cancer, diabetes, kidney, liver, immunologic)  o People who have a weakened immune system, including those who:   Are in cancer treatment  Take medicine that weakens the immune system, such as corticosteroids  Had a bone marrow or organ transplant  Have an immune deficiency  Have poorly controlled HIV or AIDS  Are obese (body mass index of 40 or higher)  Smoke regularly   o Caregivers should wear gloves while " washing dishes, handling laundry and cleaning bedrooms and bathrooms.  o Use caution when washing and drying laundry: Don't shake dirty laundry, and use the warmest water setting that you can.  o For more tips, go to www.cdc.gov/coronavirus/2019-ncov/downloads/10Things.pdf.    4.Sign up for [employer]. We know it's scary to hear that you might have COVID-19. We want to track your symptoms to make sure you're okay over the next 2 weeks. Please look for an email from [employer]---this is a free, online program that we'll use to keep in touch. To sign up, follow the link in the email. Learn more at [email or web address]  How can I take care of myself?   Get lots of rest. Drink extra fluids (unless a doctor has told you not to).   Take Tylenol (acetaminophen) for fever or pain. If you have liver or kidney problems, ask your family doctor if it's okay to take Tylenol.   Adults can take either:    650 mg (two 325 mg pills) every 4 to 6 hours, or...   1,000 mg (two 500 mg pills) every 8 hours as needed.    Note: Don't take more than 3,000 mg in one day. Acetaminophen is found in many medicines (both prescribed and over-the-counter medicines). Read all labels to be sure you don't take too much.   For children, check the Tylenol bottle for the right dose. The dose is based on the child's age or weight.    If you have other health problems (like cancer, heart failure, an organ transplant or severe kidney disease): Call your specialty clinic if you don't feel better in the next 2 days.       Know when to call 911. Emergency warning signs include:    Trouble breathing or shortness of breath Pain or pressure in the chest that doesn't go away Feeling confused like you haven't felt before, or not being able to wake up Bluish-colored lips or face.  Where can I get more information?    [employer] -- About COVID-19: [email or web address]   CDC -- What to Do If You're Sick:  www.cdc.gov/coronavirus/2019-ncov/about/steps-when-sick.html   CDC -- Ending Home Isolation: www.cdc.gov/coronavirus/2019-ncov/hcp/disposition-in-home-patients.html   [facility] -- Caring for Someone: www.cdc.gov/coronavirus/2019-ncov/if-you-are-sick/care-for-someone.html   [facility] -- Interim Guidance for Hospital Discharge to Home: www.[facility].[place]./diseases/coronavirus/hcp/hospdischarge.pdf   [facility] clinical trials (COVID-19 research studies): clinicalaffairs.[facility]-clinical-trials    Below are the COVID-19 hotlines at the [place] [facility] ([facility]). Interpreters are available.    For health questions: Call 235-977-9047 or 1-541.111.4350 (7 a.m. to 7 p.m.) For questions about schools and childcare: Call 940-275-3913 or 1-741.810.3730 (7 a.m. to 7 p.m.)    Diagnosis: Nasal congestion  Diagnosis ICD: R09.81  Prescription: fluticasone 50 mcg/actuation nasal spray,suspension 1 120 spray aerosol with adapter (grams), 30 days supply. Inhale 2 sprays in each nostril 1 time per day; after 1 week, may adjust to 1 - 2 sprays in each nostril 1 time per day.. Refills: 0, Refill as needed: no, Allow substitutions: yes  Prescription: amoxicillin-pot clavulanate 875-125 mg oral tablet 20 tablet, 10 days supply. Take 1 tablet by mouth every 12 hours for 10 days. Refills: 0, Refill as needed: no, Allow substitutions: yes  Pharmacy: [id number] - [phone] - [address] [place] [phone]

## 2020-09-25 DIAGNOSIS — Z20.822 SUSPECTED 2019 NOVEL CORONAVIRUS INFECTION: ICD-10-CM

## 2020-09-25 PROCEDURE — U0003 INFECTIOUS AGENT DETECTION BY NUCLEIC ACID (DNA OR RNA); SEVERE ACUTE RESPIRATORY SYNDROME CORONAVIRUS 2 (SARS-COV-2) (CORONAVIRUS DISEASE [COVID-19]), AMPLIFIED PROBE TECHNIQUE, MAKING USE OF HIGH THROUGHPUT TECHNOLOGIES AS DESCRIBED BY CMS-2020-01-R: HCPCS | Performed by: FAMILY MEDICINE

## 2020-09-26 LAB
SARS-COV-2 RNA SPEC QL NAA+PROBE: NOT DETECTED
SPECIMEN SOURCE: NORMAL

## 2020-10-26 ENCOUNTER — ALLIED HEALTH/NURSE VISIT (OUTPATIENT)
Dept: NURSING | Facility: CLINIC | Age: 40
End: 2020-10-26
Payer: COMMERCIAL

## 2020-10-26 DIAGNOSIS — Z23 NEED FOR PROPHYLACTIC VACCINATION AND INOCULATION AGAINST INFLUENZA: Primary | ICD-10-CM

## 2020-10-26 PROCEDURE — 90686 IIV4 VACC NO PRSV 0.5 ML IM: CPT

## 2020-10-26 PROCEDURE — 90471 IMMUNIZATION ADMIN: CPT

## 2020-12-09 ENCOUNTER — APPOINTMENT (OUTPATIENT)
Dept: ULTRASOUND IMAGING | Facility: CLINIC | Age: 40
End: 2020-12-09
Attending: EMERGENCY MEDICINE
Payer: COMMERCIAL

## 2020-12-09 ENCOUNTER — HOSPITAL ENCOUNTER (EMERGENCY)
Facility: CLINIC | Age: 40
Discharge: HOME | End: 2020-12-09
Attending: EMERGENCY MEDICINE | Admitting: EMERGENCY MEDICINE
Payer: COMMERCIAL

## 2020-12-09 ENCOUNTER — NURSE TRIAGE (OUTPATIENT)
Dept: NURSING | Facility: CLINIC | Age: 40
End: 2020-12-09

## 2020-12-09 VITALS
RESPIRATION RATE: 22 BRPM | DIASTOLIC BLOOD PRESSURE: 105 MMHG | BODY MASS INDEX: 35.8 KG/M2 | OXYGEN SATURATION: 96 % | TEMPERATURE: 98.3 F | WEIGHT: 264.33 LBS | SYSTOLIC BLOOD PRESSURE: 175 MMHG | HEART RATE: 68 BPM

## 2020-12-09 DIAGNOSIS — I10 HYPERTENSION, UNSPECIFIED TYPE: ICD-10-CM

## 2020-12-09 DIAGNOSIS — R10.13 ABDOMINAL PAIN, EPIGASTRIC: ICD-10-CM

## 2020-12-09 LAB
ALBUMIN SERPL-MCNC: 4.1 G/DL (ref 3.4–5)
ALP SERPL-CCNC: 27 U/L (ref 40–150)
ALT SERPL W P-5'-P-CCNC: 65 U/L (ref 0–70)
ANION GAP SERPL CALCULATED.3IONS-SCNC: 3 MMOL/L (ref 3–14)
AST SERPL W P-5'-P-CCNC: 33 U/L (ref 0–45)
BASOPHILS # BLD AUTO: 0.1 10E9/L (ref 0–0.2)
BASOPHILS NFR BLD AUTO: 1.3 %
BILIRUB SERPL-MCNC: 0.5 MG/DL (ref 0.2–1.3)
BUN SERPL-MCNC: 15 MG/DL (ref 7–30)
CALCIUM SERPL-MCNC: 10.5 MG/DL (ref 8.5–10.1)
CHLORIDE SERPL-SCNC: 106 MMOL/L (ref 94–109)
CO2 SERPL-SCNC: 31 MMOL/L (ref 20–32)
CREAT SERPL-MCNC: 1.2 MG/DL (ref 0.66–1.25)
DIFFERENTIAL METHOD BLD: ABNORMAL
EOSINOPHIL # BLD AUTO: 0.2 10E9/L (ref 0–0.7)
EOSINOPHIL NFR BLD AUTO: 1.9 %
ERYTHROCYTE [DISTWIDTH] IN BLOOD BY AUTOMATED COUNT: 12.5 % (ref 10–15)
GFR SERPL CREATININE-BSD FRML MDRD: 74 ML/MIN/{1.73_M2}
GLUCOSE SERPL-MCNC: 97 MG/DL (ref 70–99)
HCT VFR BLD AUTO: 38.8 % (ref 40–53)
HGB BLD-MCNC: 13.4 G/DL (ref 13.3–17.7)
IMM GRANULOCYTES # BLD: 0 10E9/L (ref 0–0.4)
IMM GRANULOCYTES NFR BLD: 0.3 %
LIPASE SERPL-CCNC: 157 U/L (ref 73–393)
LYMPHOCYTES # BLD AUTO: 2.1 10E9/L (ref 0.8–5.3)
LYMPHOCYTES NFR BLD AUTO: 26.1 %
MCH RBC QN AUTO: 31.5 PG (ref 26.5–33)
MCHC RBC AUTO-ENTMCNC: 34.5 G/DL (ref 31.5–36.5)
MCV RBC AUTO: 91 FL (ref 78–100)
MONOCYTES # BLD AUTO: 0.6 10E9/L (ref 0–1.3)
MONOCYTES NFR BLD AUTO: 8.1 %
NEUTROPHILS # BLD AUTO: 4.9 10E9/L (ref 1.6–8.3)
NEUTROPHILS NFR BLD AUTO: 62.3 %
NRBC # BLD AUTO: 0 10*3/UL
NRBC BLD AUTO-RTO: 0 /100
PLATELET # BLD AUTO: 296 10E9/L (ref 150–450)
POTASSIUM SERPL-SCNC: 3.5 MMOL/L (ref 3.4–5.3)
PROT SERPL-MCNC: 8.4 G/DL (ref 6.8–8.8)
RBC # BLD AUTO: 4.25 10E12/L (ref 4.4–5.9)
SODIUM SERPL-SCNC: 140 MMOL/L (ref 133–144)
TROPONIN I SERPL-MCNC: <0.015 UG/L (ref 0–0.04)
WBC # BLD AUTO: 7.9 10E9/L (ref 4–11)

## 2020-12-09 PROCEDURE — 83690 ASSAY OF LIPASE: CPT | Performed by: EMERGENCY MEDICINE

## 2020-12-09 PROCEDURE — 85025 COMPLETE CBC W/AUTO DIFF WBC: CPT | Performed by: EMERGENCY MEDICINE

## 2020-12-09 PROCEDURE — 96360 HYDRATION IV INFUSION INIT: CPT

## 2020-12-09 PROCEDURE — 99285 EMERGENCY DEPT VISIT HI MDM: CPT | Mod: 25

## 2020-12-09 PROCEDURE — 258N000003 HC RX IP 258 OP 636: Performed by: EMERGENCY MEDICINE

## 2020-12-09 PROCEDURE — 93005 ELECTROCARDIOGRAM TRACING: CPT

## 2020-12-09 PROCEDURE — 84484 ASSAY OF TROPONIN QUANT: CPT | Performed by: EMERGENCY MEDICINE

## 2020-12-09 PROCEDURE — 76705 ECHO EXAM OF ABDOMEN: CPT

## 2020-12-09 PROCEDURE — 96361 HYDRATE IV INFUSION ADD-ON: CPT

## 2020-12-09 PROCEDURE — 80053 COMPREHEN METABOLIC PANEL: CPT | Performed by: EMERGENCY MEDICINE

## 2020-12-09 RX ORDER — SODIUM CHLORIDE 9 MG/ML
INJECTION, SOLUTION INTRAVENOUS CONTINUOUS
Status: DISCONTINUED | OUTPATIENT
Start: 2020-12-09 | End: 2020-12-09 | Stop reason: HOSPADM

## 2020-12-09 RX ADMIN — SODIUM CHLORIDE 1000 ML: 9 INJECTION, SOLUTION INTRAVENOUS at 16:17

## 2020-12-09 NOTE — ED AVS SNAPSHOT
[facility]  [address]  [facility] [address]  Phone: [phone]  Fax: [phone]                                    Denny Herrera   MRN: [id number]    Department: [facility] Emergency [facility]   Date of Visit: 12/9/2020           After Visit Summary Signature Page    I have received my discharge instructions, and my questions have been answered. I have discussed any challenges I see with this plan with the nurse or doctor.    ..........................................................................................................................................  Patient/Patient Representative Signature      ..........................................................................................................................................  Patient Representative Print Name and Relationship to Patient    ..................................................               ................................................  Date                                   Time    ..........................................................................................................................................  Reviewed by Signature/Title    ...................................................              ..............................................  Date                                               Time          22EPIC Rev 08/18

## 2020-12-09 NOTE — TELEPHONE ENCOUNTER
" \"Last night I developed a severe headache, today I have pain in my upper stomach\".   Caller is reporting pain with taking deep breath and pain had been in his jaw last night.    COVID 19 Nurse Triage Plan/Patient Instructions    Please be aware that novel coronavirus (COVID-19) may be circulating in the community. If you develop symptoms such as fever, cough, or SOB or if you have concerns about the presence of another infection including coronavirus (COVID-19), please contact your health care provider or visit [email or web address].     Disposition/Instructions    ED Visit recommended. Follow protocol based instructions.     Bring Your Own Device:  Please also bring your smart device(s) (smart phones, tablets, laptops) and their charging cables for your personal use and to communicate with your care team during your visit.    Thank you for taking steps to prevent the spread of this virus.  o Limit your contact with others.  o Wear a simple mask to cover your cough.  o Wash your hands well and often.    Resources    [facility]: About COVID-19: [email or web address]    CDC: What to Do If You're Sick: www.cdc.gov/coronavirus/2019-ncov/about/steps-when-sick.html    CDC: Ending Home Isolation: www.cdc.gov/coronavirus/2019-ncov/hcp/disposition-in-home-patients.html     CDC: Caring for Someone: www.cdc.gov/coronavirus/2019-ncov/if-you-are-sick/care-for-someone.html     [facility]: Interim Guidance for Hospital Discharge to Home: www.health.[facility].[place].us/diseases/coronavirus/hcp/hospdischarge.pdf    [facility] clinical trials (COVID-19 research studies): clinicalaffairs.[facility]-clinical-trials     Below are the COVID-19 hotlines at the [facility] ([facility]). Interpreters are available.   o For health questions: Call 658-724-7540 or 1-102.493.3656 (7 a.m. to 7 p.m.)  o For questions about schools and childcare: Call 215-685-4348 or 1-192.948.5103 (7 a.m. to 7 p.m.)       Reason for " "Disposition    [1] Pain lasts > 10 minutes AND [2] age > 40 AND [3] associated chest, arm, neck, upper back or jaw pain    Additional Information    Negative: Shock suspected (e.g., cold/pale/clammy skin, too weak to stand, low BP, rapid pulse)    Negative: Difficult to awaken or acting confused (e.g., disoriented, slurred speech)    Negative: Passed out (i.e., lost consciousness, collapsed and was not responding)    Negative: Sounds like a life-threatening emergency to the triager    Negative: Chest pain    Pain is mainly in upper abdomen  (if needed ask: \"is it mainly above the belly button?\")    Negative: Severe difficulty breathing (e.g., struggling for each breath, speaks in single words)    Negative: Shock suspected (e.g., cold/pale/clammy skin, too weak to stand, low BP, rapid pulse)    Negative: Difficult to awaken or acting confused (e.g., disoriented, slurred speech)    Negative: Passed out (i.e., lost consciousness, collapsed and was not responding)    Negative: Sounds like a life-threatening emergency to the triager    Negative: Visible sweat on face or sweat dripping down face    Negative: Followed an abdomen (stomach) injury    Negative: Chest pain    Negative: [1] SEVERE pain (e.g., excruciating) AND [2] present > 1 hour    Negative: [1] Pain lasts > 10 minutes AND [2] age > 50    Protocols used: ABDOMINAL PAIN - UPPER-A-AH, ABDOMINAL PAIN - MALE-A-AH    Latosha Curran RN  [facility]      "

## 2020-12-09 NOTE — ED PROVIDER NOTES
History     Chief Complaint:  Chest Pain       HPI   Denny Herrera is a 40 year old male who presents with 24 hours of a mild frontal headache and then reports that he slept for 12 hours was still somewhat tired this morning.  He had no other symptoms at that time but had a sandwich just before the pain started 1 hour prior to arrival and reports sudden onset of significant dull epigastric pain.  He did report that he was somewhat sweaty but denied any shortness of breath cough loss of smell or taste fevers chills no vomiting or diarrhea.  The patient said the pain last about 20 minutes did wax and wane but did not radiate he has never had this pain before.  He does have a history of high blood pressure but denies any known heart problems blood clots or lung issues.        Allergies:  No Known Drug Allergies      Medications:    Miralax  Lisinopril  Fenofibrate  Flexeril  Wellbutrin  Zyloprim     Past Medical History:    Raynaud's syndrome  Podagra  Obesity   Hypertriglyceridemia  Hypertension  Hepatic stenosis  Gout  Enthesopathy   Depressive disorder  Basal cell carcinoma  Prediabetes     Past Surgical History:    Arthroscopy knee  Arthrotomy with fresh osteochondral allograft knee   Back surgery C5-6  Colonoscopy   Hemorrhoidectomy external   Orthopedic surgery  Osteotomy tibia  Remove hardware knee  Right knee surgery x2  Left knee surgery x3  ACL and medicus arthroscopic procedure x3     Family History:    Heart disease  Alcohol/drug  Depression  Hypertension   Eye disorder  Circulatory   Arthritis    Bipolar disorder  Anxiety  Endocrine disorder  ADD      Social History:  Smoking Status: Never Smoker  Smokeless Tobacco: Never Used  Alcohol Use: Positive  Drug Use: Negative  PCP: Martha العراقي    Review of Systems  He reported originally when he pushed on the pain that made it worse but this is now gone.  10 point review of systems all negative except    Physical Exam     Patient Vitals for the past  24 hrs:   BP Temp Temp src Pulse Resp SpO2 Weight   12/09/20 1539 (!) 175/114 98  F (36.7  C) Oral 80 20 99 % 119.9 kg (264 lb 5.3 oz)        Physical Exam  General: The patient is alert, in no respiratory distress.    HENT: Mucous membranes moist.    Cardiovascular: Regular rate and rhythm. Good pulses in all four extremities. Normal capillary refill and skin turgor.     Respiratory: Lungs are clear. No nasal flaring. No retractions. No wheezing, no crackles.    Gastrointestinal: Abdomen soft. No guarding, no rebound. No palpable hernias.  No abdominal tenderness    Musculoskeletal: No gross deformity.     Skin: No rashes or petechiae.     Neurologic: The patient is alert and oriented x3. GCS 15. No testable cranial nerve deficit. Follows commands with clear and appropriate speech. Gives appropriate answers. Good strength in all extremities. No gross neurologic deficit. Gross sensation intact. Pupils are round and reactive. No meningismus.     Lymphatic: No cervical adenopathy. No lower extremity swelling.    Psychiatric: The patient is non-tearful.    Emergency Department Course     ECG:  ECG taken at 1549, ECG read at 1607  Normal sinus rhythm  Normal ECG  Rate 72 bpm. NE interval 196 ms. QRS duration 92 ms. QT/QTc 374/409 ms. P-R-T axes 45 54 66.    Imaging:  Radiology findings were communicated with the patient who voiced understanding of the findings.    US Abdomen Limited  Final Result  IMPRESSION:  1.  No gallstones. Technically compromised exam due to bowel gas and fatty liver. The common bile duct is not visualized due to overlying bowel gas.  2.  Fatty infiltration of the liver.  Reading per radiology     Laboratory:  Laboratory findings were communicated with the patient who voiced understanding of the findings.    CBC: WBC 7.9, HGB 13.4,   CMP: calcium 10.5 (H), ALKPHOS 27 (L) o/w WNL (Creatinine 1.20)  Lipase: 157  Troponin (Collected 1554): <0.015     Procedures    Interventions:  1617 NS 1 L  IV     Emergency Department Course:    1544 Nursing notes and vitals reviewed.    1554 IV was inserted and blood was drawn for laboratory testing, results above.     1557 I performed an exam of the patient as documented above.  Discussed probable causes and his elevated blood pressure with the patient.    1645 The patient was sent for a US while in the emergency department, results above.      1736 Patient rechecked and updated.       Findings and plan explained to the Patient. Patient discharged home with instructions regarding supportive care, medications, and reasons to return. The importance of close follow-up was reviewed.     Impression & Plan        Medical Decision Making:  Denny Herrera is a 40 year old male who presents to the emergency department today for evaluation of epigastric abdominal pain associated with headache.  Due to the possible is being cardiac related I did check a screening troponin however his story does not sound typical for that and it is well-circumscribed does not radiate is not exertional and was worse with pushing.  The patient does report some associated diaphoresis but I am more suspicious about this being biliary colic which would explain clamminess.  He is not currently clammy he has no current pain and no current signs of ischemia on his EKG.  His ultrasound of the right upper quadrant is not diagnostic I discussed this with the patient he was discharged outpatient follow-up I discussed eating a low-fat diet and following up with a primary care doctor regarding his blood pressure as well as this pain.  Symptoms return for discussed he is aware that coronary disease not been completely excluded however this does not sound like an episode consistent with it and he may need further outpatient work-up.        Diagnosis:    ICD-10-CM    1. Abdominal pain, epigastric  R10.13    2. Hypertension, unspecified type  I10         Disposition:   The patient is discharged to home.      Discharge Medications:  New Prescriptions    No medications on file       Scribe Disclosure:  I, Jose J Saldivar, am serving as a scribe at 3:46 PM on 12/9/2020 to document services personally performed by Ángel Whitfield MD based on my observations and the provider's statements to me.        Ángel Whitfield MD  12/09/20 2016

## 2020-12-09 NOTE — ED TRIAGE NOTES
Pt arrives to the ED due to epigastric chest pain that began 1 hr ago, that lasted 20 min. Pt denies any associated SOB with pain, but states pain did increase with deep breath. Lightheaded. Headache since yesterday along with low grade fevers. Denies nausea/vomiting.

## 2020-12-10 DIAGNOSIS — M10.9 PODAGRA: ICD-10-CM

## 2020-12-10 LAB — INTERPRETATION ECG - MUSE: NORMAL

## 2020-12-11 RX ORDER — ALLOPURINOL 300 MG/1
TABLET ORAL
Qty: 135 TABLET | Refills: 3 | Status: SHIPPED | OUTPATIENT
Start: 2020-12-11 | End: 2022-05-05

## 2020-12-11 NOTE — TELEPHONE ENCOUNTER
Routing refill request to provider for review/approval because:  Labs not current:  Uric acid    Alexandra LEMA RN, BSN

## 2020-12-21 ENCOUNTER — TRANSFERRED RECORDS (OUTPATIENT)
Dept: HEALTH INFORMATION MANAGEMENT | Facility: CLINIC | Age: 40
End: 2020-12-21

## 2021-02-10 ENCOUNTER — TRANSFERRED RECORDS (OUTPATIENT)
Dept: HEALTH INFORMATION MANAGEMENT | Facility: CLINIC | Age: 41
End: 2021-02-10

## 2021-04-27 ENCOUNTER — OFFICE VISIT (OUTPATIENT)
Dept: FAMILY MEDICINE | Facility: CLINIC | Age: 41
End: 2021-04-27
Payer: COMMERCIAL

## 2021-04-27 VITALS
HEART RATE: 72 BPM | WEIGHT: 259 LBS | HEIGHT: 73 IN | SYSTOLIC BLOOD PRESSURE: 118 MMHG | TEMPERATURE: 98.1 F | OXYGEN SATURATION: 99 % | DIASTOLIC BLOOD PRESSURE: 84 MMHG | BODY MASS INDEX: 34.33 KG/M2 | RESPIRATION RATE: 14 BRPM

## 2021-04-27 DIAGNOSIS — G45.9 TIA (TRANSIENT ISCHEMIC ATTACK): ICD-10-CM

## 2021-04-27 DIAGNOSIS — G45.0 VERTEBRAL ARTERY INSUFFICIENCY: ICD-10-CM

## 2021-04-27 DIAGNOSIS — R42 DIZZINESS: ICD-10-CM

## 2021-04-27 DIAGNOSIS — M54.2 NECK PAIN: Primary | ICD-10-CM

## 2021-04-27 PROCEDURE — 99214 OFFICE O/P EST MOD 30 MIN: CPT | Performed by: FAMILY MEDICINE

## 2021-04-27 ASSESSMENT — PATIENT HEALTH QUESTIONNAIRE - PHQ9
SUM OF ALL RESPONSES TO PHQ QUESTIONS 1-9: 13
10. IF YOU CHECKED OFF ANY PROBLEMS, HOW DIFFICULT HAVE THESE PROBLEMS MADE IT FOR YOU TO DO YOUR WORK, TAKE CARE OF THINGS AT HOME, OR GET ALONG WITH OTHER PEOPLE: SOMEWHAT DIFFICULT
SUM OF ALL RESPONSES TO PHQ QUESTIONS 1-9: 13

## 2021-04-27 ASSESSMENT — ENCOUNTER SYMPTOMS
FATIGUE: 0
WEAKNESS: 1
FEVER: 0
SPEECH DIFFICULTY: 1
NERVOUS/ANXIOUS: 0
HEADACHES: 1
LIGHT-HEADEDNESS: 1
SLEEP DISTURBANCE: 0

## 2021-04-27 ASSESSMENT — MIFFLIN-ST. JEOR: SCORE: 2120.76

## 2021-04-27 NOTE — PROGRESS NOTES
"    Assessment & Plan     Neck pain  - aspirin (ASA) 81 MG EC tablet; Take 1 tablet (81 mg) by mouth daily    Vertebral artery insufficiency  - MRA Neck ([place]) wo & w Contrast; Future  - MRA Brain ([place]) wo & w Contrast; Future  - MR Brain w/o Contrast; Future    TIA (transient ischemic attack)  - MRA Neck ([place]) wo & w Contrast; Future  - MRA Brain ([place]) wo & w Contrast; Future  - MR Brain w/o Contrast; Future    Dizziness  -Patient was following with physical therapist for neck pain..    While having neck therapy patient has an episode when he was dizzy, lost his memory had blurred vision , headache and was unable to count backwards.  Lasting for 45 seconds.    Therapy referred for further testing regarding concerns for vertebral artery insufficiency.    Patient denies any headache today.   Patient described he did have a history of hypertriglyceridemia, impaired fasting glucose, hypertension.  Also have a family history for vascular artery disease.    In view of patient's risk factor, episode of TIA, concern for vertebral artery insufficiency.    Discussed with radiologist, MRI brain, MRA brain and neck recommended.    We also discussed baby aspirin( 81 mg )  follow-up with primary care provider, optimizing lipid panel, weight loss, dietary modification.             BMI:   Estimated body mass index is 34.64 kg/m  as calculated from the following:    Height as of this encounter: 1.842 m (6' 0.5\").    Weight as of this encounter: 117.5 kg (259 lb).   Weight management plan: Discussed healthy diet and exercise guidelines        No follow-ups on file.    Jane Lou MD  [facility]    Claudia Baldwin is a 40 year old who presents for the following health issues     History of Present Illness       Migraines:   Since the patient's last clinic visit, headaches are: no change  The patient is getting headaches:  Daily  He is able to do normal daily activities " "when he has a migraine.  The patient is taking the following rescue/relief medications:  Ibuprofen (Advil, Motrin) and Tylenol   Patient states \"I get some relief\" from the rescue/relief medications.   The patient is taking the following medications to prevent migraines:  No medications to prevent migraines  In the past 4 weeks, the patient has gone to an Urgent Care or Emergency Room 0 times times due to headaches.    Vascular Disease:  He presents for follow up of vascular disease.  He never takes nitroglycerin. He is not taking daily aspirin.    He eats 2-3 servings of fruits and vegetables daily.He consumes 0 sweetened beverage(s) daily.He exercises with enough effort to increase his heart rate 10 to 19 minutes per day.  He exercises with enough effort to increase his heart rate 3 or less days per week. He is missing 1 dose(s) of medications per week.       Neck Pain  Onset/Duration: 2 years ago  Description:   Location: bilateral  Radiation: into the right shoulder  Intensity: severe  Progression of Symptoms:  worsening  Accompanying Signs & Symptoms:  Burning, tingling, prickly sensation in arm(s): no  Numbness in arm(s): no  Weakness in arm(s):  no  Fever: no  Headache: YES  Nausea and/or vomiting: no  History:   Trauma: YES  Previous neck pain: YES  Previous surgery or injections: YES  Previous Imaging (MRI,X ray): YES  Precipitating or alleviating factors: None  Does movement impact the pain:  YES  Therapies tried and outcome: ice, massage, stretching, exercises, Ibuprofen and physical therapy    Following with physical therapy during session  he noticed sudden dizziness, lightheadedness, blurry vision, fogginess, unable to drink or problem.    Denies any chest pain, shortness of breath, headache.    Review of Systems   Constitutional: Negative for fatigue and fever.   Neurological: Positive for speech difficulty, weakness, light-headedness and headaches.        Symptoms lasted 45 seconds now resolved . " "  Psychiatric/Behavioral: Negative for sleep disturbance. The patient is not nervous/anxious.       Vertebral artery insufficiency test positive performed by the therapist .      Objective    Pulse 72   Temp 98.1  F (36.7  C) (Oral)   Resp 14   Ht 1.842 m (6' 0.5\")   Wt 117.5 kg (259 lb)   SpO2 99%   BMI 34.64 kg/m    Body mass index is 34.64 kg/m .  Physical Exam  Vitals signs reviewed.   Constitutional:       Appearance: Normal appearance.   HENT:      Head: Normocephalic.      Nose: Nose normal.      Mouth/Throat:      Mouth: Mucous membranes are moist.   Neck:      Musculoskeletal: Normal range of motion.   Cardiovascular:      Rate and Rhythm: Normal rate and regular rhythm.      Pulses: Normal pulses.      Heart sounds: Normal heart sounds.   Pulmonary:      Effort: Pulmonary effort is normal.      Breath sounds: Normal breath sounds.   Abdominal:      General: Abdomen is flat.      Palpations: Abdomen is soft.   Musculoskeletal: Normal range of motion.      Right lower leg: No edema.      Left lower leg: No edema.   Neurological:      General: No focal deficit present.      Mental Status: He is alert and oriented to person, place, and time. Mental status is at baseline.      Sensory: No sensory deficit.      Coordination: Coordination normal.      Gait: Gait normal.      Deep Tendon Reflexes: Reflexes normal.   Psychiatric:         Mood and Affect: Mood normal.         Behavior: Behavior normal.              The 10-year ASCVD risk score (Dothanroshan BOOTH Jr., et al., 2013) is: 3.1%    Values used to calculate the score:      Age: 40 years      Sex: Male      Is Non- : No      Diabetic: No      Tobacco smoker: No      Systolic Blood Pressure: 118 mmHg      Is BP treated: Yes      HDL Cholesterol: 27 mg/dL      Total Cholesterol: 193 mg/dL     Answers for HPI/ROS submitted by the patient on 4/27/2021   Chronic problems general questions HPI Form  If you checked off any problems, how " difficult have these problems made it for you to do your work, take care of things at home, or get along with other people?: Somewhat difficult  PHQ9 TOTAL SCORE: 13

## 2021-04-28 ASSESSMENT — PATIENT HEALTH QUESTIONNAIRE - PHQ9: SUM OF ALL RESPONSES TO PHQ QUESTIONS 1-9: 13

## 2021-05-01 ENCOUNTER — TRANSFERRED RECORDS (OUTPATIENT)
Dept: HEALTH INFORMATION MANAGEMENT | Facility: CLINIC | Age: 41
End: 2021-05-01

## 2021-05-07 ENCOUNTER — HOSPITAL ENCOUNTER (OUTPATIENT)
Dept: MRI IMAGING | Facility: CLINIC | Age: 41
Discharge: HOME | End: 2021-05-07
Attending: FAMILY MEDICINE | Admitting: FAMILY MEDICINE
Payer: COMMERCIAL

## 2021-05-07 DIAGNOSIS — G45.9 TIA (TRANSIENT ISCHEMIC ATTACK): ICD-10-CM

## 2021-05-07 DIAGNOSIS — G45.0 VERTEBRAL ARTERY INSUFFICIENCY: ICD-10-CM

## 2021-05-07 PROCEDURE — 70549 MR ANGIOGRAPH NECK W/O&W/DYE: CPT

## 2021-05-07 PROCEDURE — 70544 MR ANGIOGRAPHY HEAD W/O DYE: CPT

## 2021-05-07 PROCEDURE — A9585 GADOBUTROL INJECTION: HCPCS | Performed by: FAMILY MEDICINE

## 2021-05-07 PROCEDURE — 70553 MRI BRAIN STEM W/O & W/DYE: CPT

## 2021-05-07 PROCEDURE — 255N000002 HC RX 255 OP 636: Performed by: FAMILY MEDICINE

## 2021-05-07 RX ORDER — GADOBUTROL 604.72 MG/ML
10 INJECTION INTRAVENOUS ONCE
Status: COMPLETED | OUTPATIENT
Start: 2021-05-07 | End: 2021-05-07

## 2021-05-07 RX ADMIN — GADOBUTROL 10 ML: 604.72 INJECTION INTRAVENOUS at 07:44

## 2021-05-10 ENCOUNTER — TRANSFERRED RECORDS (OUTPATIENT)
Dept: HEALTH INFORMATION MANAGEMENT | Facility: CLINIC | Age: 41
End: 2021-05-10

## 2021-05-17 ENCOUNTER — TRANSFERRED RECORDS (OUTPATIENT)
Dept: HEALTH INFORMATION MANAGEMENT | Facility: CLINIC | Age: 41
End: 2021-05-17

## 2021-07-25 ENCOUNTER — HEALTH MAINTENANCE LETTER (OUTPATIENT)
Age: 41
End: 2021-07-25

## 2021-08-06 ENCOUNTER — TRANSFERRED RECORDS (OUTPATIENT)
Dept: HEALTH INFORMATION MANAGEMENT | Facility: CLINIC | Age: 41
End: 2021-08-06

## 2021-09-18 ENCOUNTER — NURSE TRIAGE (OUTPATIENT)
Dept: NURSING | Facility: CLINIC | Age: 41
End: 2021-09-18

## 2021-09-18 NOTE — TELEPHONE ENCOUNTER
"Wife Aydee states that Denny had a stent put in on Thursday September 16 and today pain in currently an \"8\".  Wife states that she will bring Denny to ED.  Patient is not getting any relief from pain medication.      COVID 19 Nurse Triage Plan/Patient Instructions    Please be aware that novel coronavirus (COVID-19) may be circulating in the community. If you develop symptoms such as fever, cough, or SOB or if you have concerns about the presence of another infection including coronavirus (COVID-19), please contact your health care provider or visit [email or web address].     Disposition/Instructions    ED Visit recommended. Follow protocol based instructions.     Bring Your Own Device:  Please also bring your smart device(s) (smart phones, tablets, laptops) and their charging cables for your personal use and to communicate with your care team during your visit.    Thank you for taking steps to prevent the spread of this virus.  o Limit your contact with others.  o Wear a simple mask to cover your cough.  o Wash your hands well and often.    Resources    [facility]: About COVID-19: [email or web address]    CDC: What to Do If You're Sick: www.cdc.gov/coronavirus/2019-ncov/about/steps-when-sick.html    CDC: Ending Home Isolation: www.cdc.gov/coronavirus/2019-ncov/hcp/disposition-in-home-patients.html     CDC: Caring for Someone: www.cdc.gov/coronavirus/2019-ncov/if-you-are-sick/care-for-someone.html     [facility]: Interim Guidance for Hospital Discharge to Home: www.health.[facility].[place]/diseases/coronavirus/hcp/hospdischarge.pdf    [facility] clinical trials (COVID-19 research studies): [email or web address]/um-clinical-trials     Below are the COVID-19 hotlines at the [place] Department of Health ([facility]). Interpreters are available.   o For health questions: Call 295-098-9554 or 1-695.361.4316 (7 a.m. to 7 p.m.)  o For questions about schools and childcare: Call 910-614-4689 or " 0-703-620-5437 (7 a.m. to 7 p.m.)                       Reason for Disposition    Sounds like a serious complication to the triager    Additional Information    Negative: [1] Major abdominal surgical incision AND [2] wound gaping open AND [3] visible internal organs    Negative: Sounds like a life-threatening emergency to the triager    Negative: [1] Bleeding from incision AND [2] won't stop after 10 minutes of direct pressure    Negative: [1] Widespread rash AND [2] bright red, sunburn-like    Negative: Severe pain in the incision    Negative: [1] Incision gaping open AND [2] < 48 hours since wound re-opened    Negative: [1] Incision gaping open AND [2] length of opening > 2 inches (5 cm)    Negative: Patient sounds very sick or weak to the triager    Protocols used: POST-OP INCISION SYMPTOMS AND [id number]

## 2021-09-19 ENCOUNTER — HEALTH MAINTENANCE LETTER (OUTPATIENT)
Age: 41
End: 2021-09-19

## 2021-10-12 ENCOUNTER — TRANSFERRED RECORDS (OUTPATIENT)
Dept: HEALTH INFORMATION MANAGEMENT | Facility: CLINIC | Age: 41
End: 2021-10-12
Payer: COMMERCIAL

## 2021-10-19 PROBLEM — F32.9 MAJOR DEPRESSION: Status: ACTIVE | Noted: 2018-09-10

## 2021-11-10 ENCOUNTER — E-VISIT (OUTPATIENT)
Dept: URGENT CARE | Facility: URGENT CARE | Age: 41
End: 2021-11-10
Payer: COMMERCIAL

## 2021-11-10 ENCOUNTER — LAB (OUTPATIENT)
Dept: URGENT CARE | Facility: URGENT CARE | Age: 41
End: 2021-11-10
Attending: PHYSICIAN ASSISTANT
Payer: COMMERCIAL

## 2021-11-10 DIAGNOSIS — J02.9 SORE THROAT: ICD-10-CM

## 2021-11-10 DIAGNOSIS — Z20.822 SUSPECTED COVID-19 VIRUS INFECTION: ICD-10-CM

## 2021-11-10 LAB
DEPRECATED S PYO AG THROAT QL EIA: NEGATIVE
GROUP A STREP BY PCR: NOT DETECTED

## 2021-11-10 PROCEDURE — 99421 OL DIG E/M SVC 5-10 MIN: CPT | Performed by: PHYSICIAN ASSISTANT

## 2021-11-10 PROCEDURE — U0003 INFECTIOUS AGENT DETECTION BY NUCLEIC ACID (DNA OR RNA); SEVERE ACUTE RESPIRATORY SYNDROME CORONAVIRUS 2 (SARS-COV-2) (CORONAVIRUS DISEASE [COVID-19]), AMPLIFIED PROBE TECHNIQUE, MAKING USE OF HIGH THROUGHPUT TECHNOLOGIES AS DESCRIBED BY CMS-2020-01-R: HCPCS

## 2021-11-10 PROCEDURE — U0005 INFEC AGEN DETEC AMPLI PROBE: HCPCS

## 2021-11-10 PROCEDURE — 87651 STREP A DNA AMP PROBE: CPT

## 2021-11-10 NOTE — PATIENT INSTRUCTIONS
Dear Denny Herrera,    Your symptoms show that you may have coronavirus (COVID-19). This illness can cause fever, cough and trouble breathing. Many people get a mild case and get better on their own. Some people can get very sick.    Because you also reported sore throat I would like to also test you for Strep Throat to determine if we need to treat you for that as well.    What should I do?  We would like to test you for Covid-19 virus and Strep Throat. I have placed orders for these tests.   To schedule: go to your [employer] home page and scroll down to the section that says  You have an appointment that needs to be scheduled  and click the large green button that says  Schedule Now  and follow the steps to find the next available openings. It is important that when you are asked what the reason for your appointment is that you mention you need BOTH Covid and Strep tests.    If you are unable to complete these [employer] scheduling steps, please call [phone] to schedule your testing.     Return to work/school/ guidance:   Please let your workplace manager and staffing office know when your quarantine ends     We can t give you an exact date as it depends on the above. You can calculate this on your own or work with your manager/staffing office to calculate this. (For example if you were exposed on 10/4, you would have to quarantine for 14 full days. That would be through 10/18. You could return on 10/19.)      If you receive a positive COVID-19 test result, follow the guidance of the those who are giving you the results. Usually the return to work is 10 (or in some cases 20 days from symptom onset.) If you work at [employer], you must also be cleared by Employee Occupational Health and Safety to return to work.        If you receive a negative COVID-19 test result and did not have a high risk exposure to someone with a known positive COVID-19 test, you can return to work once you're free of fever  for 24 hours without fever-reducing medication and your symptoms are improving or resolved.      If you receive a negative COVID-19 test and If you had a high risk exposure to someone who has tested positive for COVID-19 then you can return to work 14 days after your last contact with the positive individual    Note: If you have ongoing exposure to the covid positive person, this quarantine period may be more than 14 days. (For example, if you are continued to be exposed to your child who tested positive and cannot isolate from them, then the quarantine of 7-14 days can't start until your child is no longer contagious. This is typically 10 days from onset of the child's symptoms. So the total duration may be 17-24 days in this case.)    Sign up for [employer].   We know it's scary to hear that you might have COVID-19. We want to track your symptoms to make sure you're okay over the next 2 weeks. Please look for an email from [employer]--this is a free, online program that we'll use to keep in touch. To sign up, follow the link in the email you will receive. Learn more at [email or web address]    How can I take care of myself?    Get lots of rest. Drink extra fluids (unless a doctor has told you not to)    Take Tylenol (acetaminophen) or ibuprofen for fever or pain. If you have liver or kidney problems, ask your family doctor if it's okay to take Tylenol o ibuprofen    If you have other health problems (like cancer, heart failure, an organ transplant or severe kidney disease): Call your specialty clinic if you don't feel better in the next 2 days.    Know when to call 911. Emergency warning signs include:  o Trouble breathing or shortness of breath  o Pain or pressure in the chest that doesn't go away  o Feeling confused like you haven't felt before, or not being able to wake up  o Bluish-colored lips or face    Where can I get more information?  [facility] - About COVID-19:    [email or web address]    CDC - What to Do If You're Sick:   www.cdc.gov/coronavirus/2019-ncov/about/steps-when-sick.html    November 10, 2021  RE:  Denny Herrera                                                                                                                  [address]  [place] [address]      To whom it may concern:    I evaluated Denny Herrera on November 10, 2021. Denny Herrera should be excused from work/school.     They should let their workplace manager and staffing office know when their quarantine ends.    We can not give an exact date as it depends on the information below. They can calculate this on their own or work with their manager/staffing office to calculate this. (For example if they were exposed on 10/04, they would have to quarantine for 14 full days. That would be through 10/18. They could return on 10/19.)    Quarantine Guidelines:      If patient receives a positive COVID-19 test result, they should follow the guidance of those who are giving the results. Usually the return to work is 10 (or in some cases 20 days from symptom onset.) If they work at [employer], they must be cleared by Employee Occupational Health and Safety to return to work.        If patient receives a negative COVID-19 test result and did not have a high risk exposure to someone with a known positive COVID-19 test, they can return to work once they're free of fever for 24 hours without fever-reducing medication and their symptoms are improving or resolved.      If patient receives a negative COVID-19 test and if they had a high risk exposure to someone who has tested positive for COVID-19 then they can return to work 14 days after their last contact with the positive individual    Note: If there is ongoing exposure to the covid positive person, this quarantine period may be longer than 14 days. (For example, if they are continually exposed to their child, who tested positive and cannot  isolate from them, then the quarantine of 7-14 days can't start until their child is no longer contagious. This is typically 10 days from onset to the child's symptoms. So the total duration may be 17-24 days in this case.)     Sincerely,  Anastacio Danielle PA-C

## 2021-11-11 LAB — SARS-COV-2 RNA RESP QL NAA+PROBE: NEGATIVE

## 2021-11-17 ENCOUNTER — TRANSFERRED RECORDS (OUTPATIENT)
Dept: HEALTH INFORMATION MANAGEMENT | Facility: CLINIC | Age: 41
End: 2021-11-17
Payer: COMMERCIAL

## 2021-11-17 DIAGNOSIS — F32.0 MILD MAJOR DEPRESSION (H): ICD-10-CM

## 2021-11-17 DIAGNOSIS — F90.0 ATTENTION DEFICIT HYPERACTIVITY DISORDER (ADHD), PREDOMINANTLY INATTENTIVE TYPE: ICD-10-CM

## 2021-11-17 DIAGNOSIS — I10 BENIGN ESSENTIAL HYPERTENSION: ICD-10-CM

## 2021-11-18 RX ORDER — BUPROPION HYDROCHLORIDE 300 MG/1
TABLET ORAL
Qty: 90 TABLET | Refills: 3 | Status: SHIPPED | OUTPATIENT
Start: 2021-11-18 | End: 2023-04-19

## 2021-11-18 RX ORDER — LISINOPRIL 10 MG/1
TABLET ORAL
Qty: 90 TABLET | Refills: 3 | Status: SHIPPED | OUTPATIENT
Start: 2021-11-18 | End: 2022-09-08

## 2021-11-18 NOTE — TELEPHONE ENCOUNTER
Routing refill request to provider for review/approval because:   ACE Inhibitors (Including Combos) Protocol Failed 11/17/2021 12:42 AM   Protocol Details  Recent (12 mo) or future (30 days) visit within the authorizing provider's specialty

## 2021-12-09 ENCOUNTER — OFFICE VISIT (OUTPATIENT)
Dept: FAMILY MEDICINE | Facility: CLINIC | Age: 41
End: 2021-12-09
Payer: COMMERCIAL

## 2021-12-09 VITALS
RESPIRATION RATE: 15 BRPM | DIASTOLIC BLOOD PRESSURE: 78 MMHG | HEART RATE: 72 BPM | OXYGEN SATURATION: 96 % | HEIGHT: 72 IN | BODY MASS INDEX: 35.35 KG/M2 | WEIGHT: 261 LBS | SYSTOLIC BLOOD PRESSURE: 138 MMHG | TEMPERATURE: 97.6 F

## 2021-12-09 DIAGNOSIS — Z82.49 FAMILY HISTORY OF PREMATURE CORONARY HEART DISEASE: ICD-10-CM

## 2021-12-09 DIAGNOSIS — M17.0 OSTEOARTHRITIS OF BOTH KNEES, UNSPECIFIED OSTEOARTHRITIS TYPE: ICD-10-CM

## 2021-12-09 DIAGNOSIS — F32.0 MILD MAJOR DEPRESSION (H): ICD-10-CM

## 2021-12-09 DIAGNOSIS — R73.03 PREDIABETES: ICD-10-CM

## 2021-12-09 DIAGNOSIS — Z01.818 PRE-OP EXAM: Primary | ICD-10-CM

## 2021-12-09 DIAGNOSIS — I10 BENIGN ESSENTIAL HYPERTENSION: ICD-10-CM

## 2021-12-09 DIAGNOSIS — E66.01 MORBID OBESITY (H): ICD-10-CM

## 2021-12-09 DIAGNOSIS — Z23 NEED FOR PROPHYLACTIC VACCINATION AND INOCULATION AGAINST INFLUENZA: ICD-10-CM

## 2021-12-09 DIAGNOSIS — E78.1 HYPERTRIGLYCERIDEMIA: ICD-10-CM

## 2021-12-09 LAB — HGB BLD-MCNC: 13.8 G/DL (ref 13.3–17.7)

## 2021-12-09 PROCEDURE — 90471 IMMUNIZATION ADMIN: CPT | Performed by: FAMILY MEDICINE

## 2021-12-09 PROCEDURE — 80053 COMPREHEN METABOLIC PANEL: CPT | Performed by: FAMILY MEDICINE

## 2021-12-09 PROCEDURE — 90686 IIV4 VACC NO PRSV 0.5 ML IM: CPT | Performed by: FAMILY MEDICINE

## 2021-12-09 PROCEDURE — 36415 COLL VENOUS BLD VENIPUNCTURE: CPT | Performed by: FAMILY MEDICINE

## 2021-12-09 PROCEDURE — 99214 OFFICE O/P EST MOD 30 MIN: CPT | Mod: 25 | Performed by: FAMILY MEDICINE

## 2021-12-09 PROCEDURE — 85018 HEMOGLOBIN: CPT | Performed by: FAMILY MEDICINE

## 2021-12-09 ASSESSMENT — MIFFLIN-ST. JEOR: SCORE: 2119.51

## 2021-12-09 NOTE — PROGRESS NOTES
[address] [address]  Phone: [phone]  Primary Provider: Alf Martínez  Pre-op Performing Provider: ALF MARTÍNEZ        PREOPERATIVE EVALUATION:  Today's date: 12/9/2021    Denny Herrera is a 41 year old male who presents for a preoperative evaluation.    Surgical Information:  Surgery/Procedure: Right knee replacement   Surgery Location: [facility]   Surgeon: Alma Rosa  Surgery Date: 12/28/2021  Time of Surgery: 8:30 AM  Where patient plans to recover: At home with family  Fax number for surgical facility: [phone]    Type of Anesthesia Anticipated: General    Assessment & Plan     The proposed surgical procedure is considered LOW risk.    Pre-op exam    - Comprehensive metabolic panel (BMP + Alb, Alk Phos, ALT, AST, Total. Bili, TP); Future  - Hemoglobin; Future  - Asymptomatic COVID-19 Virus (Coronavirus) by PCR; Future  - Comprehensive metabolic panel (BMP + Alb, Alk Phos, ALT, AST, Total. Bili, TP)  - Hemoglobin    Benign essential hypertension  stable    Prediabetes      Hypertriglyceridemia      Family history of premature coronary heart disease      Mild major depression (H)  stable    Osteoarthritis of both knees, unspecified osteoarthritis type    - Comprehensive metabolic panel (BMP + Alb, Alk Phos, ALT, AST, Total. Bili, TP); Future  - Hemoglobin; Future  - Comprehensive metabolic panel (BMP + Alb, Alk Phos, ALT, AST, Total. Bili, TP)  - Hemoglobin    Morbid obesity (H)      Need for prophylactic vaccination and inoculation against influenza    - INFLUENZA VACCINE IM > 6 MONTHS VALENT IIV4 (AFLURIA/FLUZONE)      Risks and Recommendations:  The patient has the following additional risks and recommendations for perioperative complications:   - No identified additional risk factors other than previously addressed    Medication Instructions:  Patient is to take all scheduled medications on the day of  surgery    RECOMMENDATION:  APPROVAL GIVEN to proceed with proposed procedure, without further diagnostic evaluation.  Subjective     HPI related to upcoming procedure:     Preop Questions 12/5/2021   1. Have you ever had a heart attack or stroke? No   2. Have you ever had surgery on your heart or blood vessels, such as a stent placement, a coronary artery bypass, or surgery on an artery in your head, neck, heart, or legs? No   3. Do you have chest pain with activity? No   4. Do you have a history of  heart failure? No   5. Do you currently have a cold, bronchitis or symptoms of other infection? No   6. Do you have a cough, shortness of breath, or wheezing? No   7. Do you or anyone in your family have previous history of blood clots? No   8. Do you or does anyone in your family have a serious bleeding problem such as prolonged bleeding following surgeries or cuts? No   9. Have you ever had problems with anemia or been told to take iron pills? No   10. Have you had any abnormal blood loss such as black, tarry or bloody stools? No   11. Have you ever had a blood transfusion? No   12. Are you willing to have a blood transfusion if it is medically needed before, during, or after your surgery? Yes   13. Have you or any of your relatives ever had problems with anesthesia? No   14. Do you have sleep apnea, excessive snoring or daytime drowsiness? YES -    14a. Do you have a CPAP machine? Yes   15. Do you have any artifical heart valves or other implanted medical devices like a pacemaker, defibrillator, or continuous glucose monitor? No   16. Do you have artificial joints? No   17. Are you allergic to latex? No       Health Care Directive:  Patient does not have a Health Care Directive or Living Will no infomraton Preoperative Review of :      Status of Chronic Conditions:  See problem list for active medical problems.  Problems all longstanding and stable, except as noted/documented.  See ROS for pertinent symptoms  related to these conditions.      Review of Systems  CONSTITUTIONAL: NEGATIVE for fever, chills, change in weight  INTEGUMENTARY/SKIN: NEGATIVE for worrisome rashes, moles or lesions  EYES: NEGATIVE for vision changes or irritation  ENT/MOUTH: NEGATIVE for ear, mouth and throat problems  RESP: NEGATIVE for significant cough or SOB  CV: NEGATIVE for chest pain, palpitations or peripheral edema  GI: NEGATIVE for nausea, abdominal pain, heartburn, or change in bowel habits  : NEGATIVE for frequency, dysuria, or hematuria  MUSCULOSKELETAL: NEGATIVE for significant arthralgias or myalgia  NEURO: NEGATIVE for weakness, dizziness or paresthesias  ENDOCRINE: NEGATIVE for temperature intolerance, skin/hair changes  HEME: NEGATIVE for bleeding problems  PSYCHIATRIC: NEGATIVE for changes in mood or affect    Patient Active Problem List    Diagnosis Date Noted     Prediabetes 02/21/2019     Priority: Medium     Chronic gout without tophus, unspecified cause, unspecified site 02/21/2019     Priority: Medium     Mild major depression (H) 09/10/2018     Priority: Medium     Family history of premature coronary heart disease 05/08/2018     Priority: Medium     Obesity (H) 03/05/2018     Priority: Medium     Benign essential hypertension 03/05/2018     Priority: Medium     Acute pain of right knee 06/21/2016     Priority: Medium     Knee pain 06/15/2016     Priority: Medium     Liver lesion - needs f/u 2/2015 08/19/2014     Priority: Medium     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 No evidence of sclerosis on exam.       Raynaud's syndrome 03/14/2014     Priority: Medium     Hepatic steatosis 03/14/2014     Priority: Medium     Hypertriglyceridemia 02/08/2013     Priority: Medium     CARDIOVASCULAR SCREENING; LDL GOAL LESS THAN 160 06/20/2011     Priority: Medium      Past Medical History:   Diagnosis Date     Ankle joint pain 11/9/2012     Basal cell carcinoma      Blood in semen       CARDIOVASCULAR SCREENING; LDL GOAL LESS THAN 160 6/20/2011     Depressive disorder      Elevated blood pressure reading without diagnosis of hypertension      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     Gout      Hepatic steatosis 3/14/2014     Hypertension      Hypertriglyceridaemia      Hypertriglyceridemia 2/8/2013     Obesity 2/8/2013     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     Raynaud disease      Raynaud's syndrome 3/14/2014     Past Surgical History:   Procedure Laterality Date     ARTHROSCOPY KNEE Right 6/15/2016    Procedure: ARTHROSCOPY KNEE;  Surgeon: Tim Valdez MD;  [facility]: [facility]     ARTHROTOMY WITH FRESH OSTEOCHONDRAL ALLOGRAFT KNEE Right 6/15/2016    Procedure: ARTHROTOMY WITH FRESH OSTEOCHONDRAL ALLOGRAFT KNEE;  Surgeon: Tim Valdez MD;  Location: [facility]     BACK SURGERY      C5-6 arthroplasty, replaced herniated disc     COLONOSCOPY N/A 8/19/2015    Procedure: COLONOSCOPY;  Surgeon: Timbo Greenberg MD;  Location: [facility] GI     HEMORRHOIDECTOMY EXTERNAL N/A 3/19/2019    Procedure: External hemorrhoidectomy;  Surgeon: Patria Roberson MD;  Location: [facility] OR     ORTHOPEDIC SURGERY       OSTEOTOMY TIBIA Right 6/15/2016    Procedure: OSTEOTOMY TIBIA;  Surgeon: Tim Valdez MD;  [facility]     REMOVE HARDWARE KNEE Right 6/15/2016    Procedure: REMOVE HARDWARE KNEE;  Surgeon: Tim Valdez MD;  Location: [facility]     ZZC NONSPECIFIC PROCEDURE  1998    10 surgeries total on both knees. 2 for R knee, 3 L knee, ACL and meniscus arthroscopic procedures x 3, 2 open surgery     Current Outpatient Medications   Medication Sig Dispense Refill     allopurinol (ZYLOPRIM) 300 MG tablet TAKE 1 TABLET DAILY AND ONE-HALF (1/2) TABLET EVERY EVENING 135 tablet 3     buPROPion (WELLBUTRIN XL) 300 MG 24 hr tablet TAKE 1 TABLET EVERY MORNING (DISCONTINUE 150 MG) 90 tablet 3     fenofibrate (TRIGLIDE/LOFIBRA) 160 MG tablet TAKE 1 TABLET DAILY 90 tablet 1     lisinopril (ZESTRIL) 10 MG tablet TAKE 1  "TABLET DAILY 90 tablet 3     multivitamin, therapeutic with minerals (MULTI-VITAMIN) TABS Take 1 tablet by mouth daily       Omega-3 Fatty Acids (OMEGA-3 FISH OIL PO) Take 1 capsule by mouth daily       polyethylene glycol (MIRALAX) powder Take 17 g (1 capful) by mouth daily 510 g 1     VITAMIN D, CHOLECALCIFEROL, PO Take by mouth daily         Allergies   Allergen Reactions     No Known Drug Allergies         Social History     Tobacco Use     Smoking status: Never Smoker     Smokeless tobacco: Never Used     Tobacco comment: never smoked   Substance Use Topics     Alcohol use: Yes     Alcohol/week: 0.0 - 1.7 standard drinks     Comment:  rarely (up to 4 beers a month)     Family History   Problem Relation Age of Onset     Family History Negative Other         neg for RA, IBD, psoriasis, does not know much about his father side of his family, neg for gout     Attention Deficit Disorder Other      Arthritis Mother         back DJD     Alcohol/Drug Mother      Depression Mother      Endocrine Disease Mother      Anxiety Disorder Mother      Back Pain Mother      Arthritis Brother         back DJD     Heart Disease Brother         MI at 40     Hypertension Brother      Bipolar Disorder Brother      Arthritis Maternal Grandmother         back DJD     Circulatory Maternal Grandmother      Eye Disorder Maternal Grandmother      Back Pain Maternal Grandmother      Cancer Maternal Aunt         ?type     Hypertension Brother      Circulatory Maternal Grandfather      Eye Disorder Maternal Grandfather      Hypertension Other         maternal grandparents     Depression Other      Alcohol/Drug Brother      Back Pain Brother      Heart Disease Father      Heart Disease Paternal Grandmother      History   Drug Use No         Objective     /78 (BP Location: Left arm, Cuff Size: Adult Regular)   Pulse 72   Temp 97.6  F (36.4  C) (Tympanic)   Resp 15   Ht 1.833 m (6' 0.17\")   Wt 118.4 kg (261 lb)   SpO2 96%   BMI " 35.24 kg/m      Physical Exam  GENERAL APPEARANCE: healthy, alert and no distress  HENT: ear canals and TM's normal and nose and mouth without ulcers or lesions  RESP: lungs clear to auscultation - no rales, rhonchi or wheezes  CV: regular rate and rhythm, normal S1 S2, no S3 or S4 and no murmur, click or rub   ABDOMEN: soft, nontender, no HSM or masses and bowel sounds normal  NEURO: Normal strength and tone, sensory exam grossly normal, mentation intact and speech normal    Recent Labs   Lab Test 12/09/20  1554 07/02/20  1104   HGB 13.4 13.5    243    136   POTASSIUM 3.5 4.4   CR 1.20 1.08   A1C  --  5.4      Results for orders placed or performed in visit on 12/09/21   Comprehensive metabolic panel (BMP + Alb, Alk Phos, ALT, AST, Total. Bili, TP)     Status: Abnormal   Result Value Ref Range    Sodium 139 133 - 144 mmol/L    Potassium 3.9 3.4 - 5.3 mmol/L    Chloride 106 94 - 109 mmol/L    Carbon Dioxide (CO2) 28 20 - 32 mmol/L    Anion Gap 5 3 - 14 mmol/L    Urea Nitrogen 18 7 - 30 mg/dL    Creatinine 1.03 0.66 - 1.25 mg/dL    Calcium 9.9 8.5 - 10.1 mg/dL    Glucose 106 (H) 70 - 99 mg/dL    Alkaline Phosphatase 22 (L) 40 - 150 U/L    AST 46 (H) 0 - 45 U/L    ALT 87 (H) 0 - 70 U/L    Protein Total 8.5 6.8 - 8.8 g/dL    Albumin 4.3 3.4 - 5.0 g/dL    Bilirubin Total 0.4 0.2 - 1.3 mg/dL    GFR Estimate 89 >60 mL/min/1.73m2   Hemoglobin     Status: Normal   Result Value Ref Range    Hemoglobin 13.8 13.3 - 17.7 g/dL       Diagnostics:  Labs pending at this time.  Results will be reviewed when available.   No EKG required, no history of coronary heart disease, significant arrhythmia, peripheral arterial disease or other structural heart disease.    Revised Cardiac Risk Index (RCRI):  The patient has the following serious cardiovascular risks for perioperative complications:   - No serious cardiac risks = 0 points     RCRI Interpretation: 0 points: Class I (very low risk - 0.4% complication rate)            Signed Electronically by: Ayo Martínez MD  Copy of this evaluation report is provided to requesting physician.

## 2021-12-12 LAB
ALBUMIN SERPL-MCNC: 4.3 G/DL (ref 3.4–5)
ALP SERPL-CCNC: 22 U/L (ref 40–150)
ALT SERPL W P-5'-P-CCNC: 87 U/L (ref 0–70)
ANION GAP SERPL CALCULATED.3IONS-SCNC: 5 MMOL/L (ref 3–14)
AST SERPL W P-5'-P-CCNC: 46 U/L (ref 0–45)
BILIRUB SERPL-MCNC: 0.4 MG/DL (ref 0.2–1.3)
BUN SERPL-MCNC: 18 MG/DL (ref 7–30)
CALCIUM SERPL-MCNC: 9.9 MG/DL (ref 8.5–10.1)
CHLORIDE BLD-SCNC: 106 MMOL/L (ref 94–109)
CO2 SERPL-SCNC: 28 MMOL/L (ref 20–32)
CREAT SERPL-MCNC: 1.03 MG/DL (ref 0.66–1.25)
GFR SERPL CREATININE-BSD FRML MDRD: 89 ML/MIN/1.73M2
GLUCOSE BLD-MCNC: 106 MG/DL (ref 70–99)
POTASSIUM BLD-SCNC: 3.9 MMOL/L (ref 3.4–5.3)
PROT SERPL-MCNC: 8.5 G/DL (ref 6.8–8.8)
SODIUM SERPL-SCNC: 139 MMOL/L (ref 133–144)

## 2021-12-15 ENCOUNTER — E-VISIT (OUTPATIENT)
Dept: FAMILY MEDICINE | Facility: CLINIC | Age: 41
End: 2021-12-15
Payer: COMMERCIAL

## 2021-12-15 DIAGNOSIS — Z20.822 COVID-19 RULED OUT: Primary | ICD-10-CM

## 2021-12-15 PROCEDURE — 99207 PR NO CHARGE LOS: CPT | Performed by: FAMILY MEDICINE

## 2021-12-23 DIAGNOSIS — E78.1 HYPERTRIGLYCERIDEMIA: ICD-10-CM

## 2021-12-24 ENCOUNTER — LAB (OUTPATIENT)
Dept: URGENT CARE | Facility: URGENT CARE | Age: 41
End: 2021-12-24
Attending: FAMILY MEDICINE
Payer: COMMERCIAL

## 2021-12-24 DIAGNOSIS — Z01.818 PRE-OP EXAM: ICD-10-CM

## 2021-12-24 LAB — SARS-COV-2 RNA RESP QL NAA+PROBE: NEGATIVE

## 2021-12-24 PROCEDURE — U0003 INFECTIOUS AGENT DETECTION BY NUCLEIC ACID (DNA OR RNA); SEVERE ACUTE RESPIRATORY SYNDROME CORONAVIRUS 2 (SARS-COV-2) (CORONAVIRUS DISEASE [COVID-19]), AMPLIFIED PROBE TECHNIQUE, MAKING USE OF HIGH THROUGHPUT TECHNOLOGIES AS DESCRIBED BY CMS-2020-01-R: HCPCS

## 2021-12-24 PROCEDURE — U0005 INFEC AGEN DETEC AMPLI PROBE: HCPCS

## 2021-12-24 RX ORDER — FENOFIBRATE 160 MG/1
TABLET ORAL
Qty: 90 TABLET | Refills: 0 | Status: SHIPPED | OUTPATIENT
Start: 2021-12-24 | End: 2022-12-28

## 2021-12-24 NOTE — TELEPHONE ENCOUNTER
Routing refill request to provider for review/approval because:  Labs not current: Lipid Panel    Visit is up to date. RN will issue one time 90 day jael refill. Please advise on labs.   Roberto BARILLSA RN

## 2022-01-11 ENCOUNTER — TRANSFERRED RECORDS (OUTPATIENT)
Dept: HEALTH INFORMATION MANAGEMENT | Facility: CLINIC | Age: 42
End: 2022-01-11
Payer: COMMERCIAL

## 2022-03-02 ENCOUNTER — TRANSFERRED RECORDS (OUTPATIENT)
Dept: HEALTH INFORMATION MANAGEMENT | Facility: CLINIC | Age: 42
End: 2022-03-02
Payer: COMMERCIAL

## 2022-04-28 ENCOUNTER — VIRTUAL VISIT (OUTPATIENT)
Dept: FAMILY MEDICINE | Facility: CLINIC | Age: 42
End: 2022-04-28
Payer: COMMERCIAL

## 2022-04-28 DIAGNOSIS — F32.0 MILD MAJOR DEPRESSION (H): ICD-10-CM

## 2022-04-28 DIAGNOSIS — E66.01 MORBID OBESITY (H): ICD-10-CM

## 2022-04-28 DIAGNOSIS — I10 BENIGN ESSENTIAL HYPERTENSION: ICD-10-CM

## 2022-04-28 DIAGNOSIS — R73.03 PREDIABETES: ICD-10-CM

## 2022-04-28 DIAGNOSIS — F33.9 RECURRENT MAJOR DEPRESSIVE DISORDER, REMISSION STATUS UNSPECIFIED (H): ICD-10-CM

## 2022-04-28 DIAGNOSIS — Z13.220 SCREENING FOR HYPERLIPIDEMIA: Primary | ICD-10-CM

## 2022-04-28 DIAGNOSIS — M10.9 GOUT, UNSPECIFIED CAUSE, UNSPECIFIED CHRONICITY, UNSPECIFIED SITE: ICD-10-CM

## 2022-04-28 PROCEDURE — 99214 OFFICE O/P EST MOD 30 MIN: CPT | Mod: 95 | Performed by: FAMILY MEDICINE

## 2022-04-28 NOTE — PROGRESS NOTES
"Denny is a 41 year old who is being evaluated via a billable video visit.      How would you like to obtain your AVS? MyChart  If the video visit is dropped, the invitation should be resent by: Text to cell phone: [phone]   Will anyone else be joining your video visit? No      Video Start Time: 2:15    Assessment & Plan     Screening for hyperlipidemia    - Lipid panel reflex to direct LDL Fasting; Future  - Comprehensive metabolic panel (BMP + Alb, Alk Phos, ALT, AST, Total. Bili, TP); Future    Prediabetes    - HEMOGLOBIN A1C; Future    Benign essential hypertension  Blood pressure is stable.  We will refill his medication if needed.  - Comprehensive metabolic panel (BMP + Alb, Alk Phos, ALT, AST, Total. Bili, TP); Future    Mild major depression (H)  Currently on Wellbutrin denies any concerns.    Gout, unspecified cause, unspecified chronicity, unspecified site  Advised to get the blood work done we will fill his medication once done.  - CBC with Platelets; Future  - Uric acid; Future    Recurrent major depressive disorder, remission status unspecified (H)      Morbid obesity (H)  Continue to work on your diet and exercise eat healthy.               BMI:   Estimated body mass index is 35.24 kg/m  as calculated from the following:    Height as of 12/9/21: 1.833 m (6' 0.17\").    Weight as of 12/9/21: 118.4 kg (261 lb).           Return in about 6 months (around 10/28/2022) for Physical Exam.    Ayo Martínez MD  [facility]    Claudia Baldwin is a 41 year old who presents for the following health issues     Patient made this appointment to get refill on his medications.  He is currently taking hypertriglyceridemia medication along with Wellbutrin for his depression anxiety and lisinopril for blood pressure.  He is also taking high-dose of allopurinol for possible gout.  Denies any side effects.  Recently had knee surgery done.  He is recovering from that.  No chest pains no " shortness of breath.        Review of Systems   Constitutional, HEENT, cardiovascular, pulmonary, gi and gu systems are negative, except as otherwise noted.      Objective           Vitals:  No vitals were obtained today due to virtual visit.    Physical Exam   GENERAL: Healthy, alert and no distress  EYES: Eyes grossly normal to inspection.  No discharge or erythema, or obvious scleral/conjunctival abnormalities.  RESP: No audible wheeze, cough, or visible cyanosis.  No visible retractions or increased work of breathing.    SKIN: Visible skin clear. No significant rash, abnormal pigmentation or lesions.  NEURO: Cranial nerves grossly intact.  Mentation and speech appropriate for age.  PSYCH: Mentation appears normal, affect normal/bright, judgement and insight intact, normal speech and appearance well-groomed.          Video-Visit Details    Type of service:  Video Visit    Video End Time:2:30 PM    Originating Location (pt. Location): Home    Distant Location (provider location):  [facility]     Platform used for Video Visit: [employer]  Answers for HPI/ROS submitted by the patient on 4/28/2022  How many servings of fruits and vegetables do you eat daily?: 2-3  On average, how many sweetened beverages do you drink each day (Examples: soda, juice, sweet tea, etc.  Do NOT count diet or artificially sweetened beverages)?: 1  How many minutes a day do you exercise enough to make your heart beat faster?: 20 to 29  How many days a week do you exercise enough to make your heart beat faster?: 4  How many days per week do you miss taking your medication?: 1  What makes it hard for you to take your medication every day?: remembering to take  What is the reason for your visit today?: Prescription Refill  When did your symptoms begin?: More than a month  What are your symptoms?: n/a

## 2022-05-04 ENCOUNTER — LAB (OUTPATIENT)
Dept: LAB | Facility: CLINIC | Age: 42
End: 2022-05-04
Payer: COMMERCIAL

## 2022-05-04 ENCOUNTER — TRANSFERRED RECORDS (OUTPATIENT)
Dept: HEALTH INFORMATION MANAGEMENT | Facility: CLINIC | Age: 42
End: 2022-05-04

## 2022-05-04 DIAGNOSIS — M10.9 GOUT, UNSPECIFIED CAUSE, UNSPECIFIED CHRONICITY, UNSPECIFIED SITE: ICD-10-CM

## 2022-05-04 DIAGNOSIS — I10 BENIGN ESSENTIAL HYPERTENSION: ICD-10-CM

## 2022-05-04 DIAGNOSIS — Z13.220 SCREENING FOR HYPERLIPIDEMIA: ICD-10-CM

## 2022-05-04 DIAGNOSIS — R73.03 PREDIABETES: ICD-10-CM

## 2022-05-04 LAB
ERYTHROCYTE [DISTWIDTH] IN BLOOD BY AUTOMATED COUNT: 13.7 % (ref 10–15)
HBA1C MFR BLD: 5.2 % (ref 0–5.6)
HCT VFR BLD AUTO: 40 % (ref 40–53)
HGB BLD-MCNC: 13.9 G/DL (ref 13.3–17.7)
MCH RBC QN AUTO: 31 PG (ref 26.5–33)
MCHC RBC AUTO-ENTMCNC: 34.8 G/DL (ref 31.5–36.5)
MCV RBC AUTO: 89 FL (ref 78–100)
PLATELET # BLD AUTO: 283 10E3/UL (ref 150–450)
RBC # BLD AUTO: 4.49 10E6/UL (ref 4.4–5.9)
WBC # BLD AUTO: 7 10E3/UL (ref 4–11)

## 2022-05-04 PROCEDURE — 36415 COLL VENOUS BLD VENIPUNCTURE: CPT

## 2022-05-04 PROCEDURE — 83036 HEMOGLOBIN GLYCOSYLATED A1C: CPT

## 2022-05-04 PROCEDURE — 80061 LIPID PANEL: CPT

## 2022-05-04 PROCEDURE — 84550 ASSAY OF BLOOD/URIC ACID: CPT

## 2022-05-04 PROCEDURE — 80053 COMPREHEN METABOLIC PANEL: CPT

## 2022-05-04 PROCEDURE — 85027 COMPLETE CBC AUTOMATED: CPT

## 2022-05-05 ENCOUNTER — TELEPHONE (OUTPATIENT)
Dept: FAMILY MEDICINE | Facility: CLINIC | Age: 42
End: 2022-05-05
Payer: COMMERCIAL

## 2022-05-05 DIAGNOSIS — R79.89 ELEVATED LIVER FUNCTION TESTS: ICD-10-CM

## 2022-05-05 DIAGNOSIS — M10.9 GOUT, UNSPECIFIED CAUSE, UNSPECIFIED CHRONICITY, UNSPECIFIED SITE: Primary | ICD-10-CM

## 2022-05-05 DIAGNOSIS — Z13.220 SCREENING FOR HYPERLIPIDEMIA: Primary | ICD-10-CM

## 2022-05-05 LAB
ALBUMIN SERPL-MCNC: 4.6 G/DL (ref 3.4–5)
ALP SERPL-CCNC: 29 U/L (ref 40–150)
ALT SERPL W P-5'-P-CCNC: 135 U/L (ref 0–70)
ANION GAP SERPL CALCULATED.3IONS-SCNC: 4 MMOL/L (ref 3–14)
AST SERPL W P-5'-P-CCNC: 92 U/L (ref 0–45)
BILIRUB SERPL-MCNC: 0.7 MG/DL (ref 0.2–1.3)
BUN SERPL-MCNC: 15 MG/DL (ref 7–30)
CALCIUM SERPL-MCNC: 9.6 MG/DL (ref 8.5–10.1)
CHLORIDE BLD-SCNC: 104 MMOL/L (ref 94–109)
CHOLEST SERPL-MCNC: 182 MG/DL
CO2 SERPL-SCNC: 28 MMOL/L (ref 20–32)
CREAT SERPL-MCNC: 1.04 MG/DL (ref 0.66–1.25)
FASTING STATUS PATIENT QL REPORTED: YES
GFR SERPL CREATININE-BSD FRML MDRD: >90 ML/MIN/1.73M2
GLUCOSE BLD-MCNC: 101 MG/DL (ref 70–99)
HDLC SERPL-MCNC: 29 MG/DL
LDLC SERPL CALC-MCNC: 118 MG/DL
NONHDLC SERPL-MCNC: 153 MG/DL
POTASSIUM BLD-SCNC: 4 MMOL/L (ref 3.4–5.3)
PROT SERPL-MCNC: 8.5 G/DL (ref 6.8–8.8)
SODIUM SERPL-SCNC: 136 MMOL/L (ref 133–144)
TRIGL SERPL-MCNC: 175 MG/DL
URATE SERPL-MCNC: 3.9 MG/DL (ref 3.5–7.2)

## 2022-05-05 RX ORDER — ALLOPURINOL 300 MG/1
300 TABLET ORAL DAILY
Qty: 90 TABLET | Refills: 3 | Status: SHIPPED | OUTPATIENT
Start: 2022-05-05 | End: 2023-07-05

## 2022-05-05 NOTE — TELEPHONE ENCOUNTER
Patient Contact     S/w pt and relayed message from Dr. Martínez, see below. He stated his understanding but is concerned about holding his Fenofibrate for 3 months. He states previous provider placed him on this when his triglycerides were very elevated and he understands recheck but he is concerned about what triglyceride might do in that period of time. He states he has a fatty liver but nothing has ever been done for the past 10-15 years.     Pt number: [phone]  Ok to leave detailed vm.     Sofia OSBORNE RN  [facility]

## 2022-05-05 NOTE — TELEPHONE ENCOUNTER
----- Message from Ayo Martínez MD sent at 5/5/2022  8:25 AM CDT -----  Please call and notify the patient/    I have reviewed your recent labs. Here are the results:  -Your cholesterol numbers indicate mildly elevated triglyceride low HDL and mildly elevated LDL which is bad cholesterol.  However you are taking a medication fenofibrate which helps with your triglycerides to come down.  This may been increasing your liver function.  I would suggest to hold that medication for 3 months we will recheck labs please get those labs done in 3 months that will help us determine if we need to continue you on that cholesterol medication.  -Normal red blood cell (hgb) levels, normal white blood cell count and normal platelet levels.  -A1C (test of diabetes control the last 2-3 months) is at your goal. Please continue with your current plan. Also, you should make an appointment to see me and recheck your A1C test in 6 months.   -Uric acid level is within normal range we will continue with just 1 tablet of 300 mg allopurinol instead of 1-1/2 tablet.      Ayo Martínez MD  5/5/2022

## 2022-05-05 NOTE — TELEPHONE ENCOUNTER
His triglycerides in the past were elevated they are better now however his liver functions are also worsening.  I would at least advise him to hold for a month so that we can see if it affects his liver function.  If is not affecting his liver function we can definitely restart that.  Fatty liver is mostly due to diet sometimes alcohol intake can do that.  Losing weight eating healthy will help.  There is no medication for that that is why I want to see if liver function improve with stopping the medication or the remain high.

## 2022-08-01 ENCOUNTER — NURSE TRIAGE (OUTPATIENT)
Dept: NURSING | Facility: CLINIC | Age: 42
End: 2022-08-01

## 2022-08-01 NOTE — TELEPHONE ENCOUNTER
[facility] outbound call:    Denny gave verbal consent to speak with wife Aydee.    Severe headache, took 600 mg of ibuprofen at 11 AM and has not helped with pain relief.     COVID positive - at home test  Pt is vaccinated and boosted    PLAN:  Per protocol, advised ED. Care advice reviewed. [facility] verbalizes understanding [facility] Advised to call back with further questions/concerns.     2nd level triage was not forwarded to care team due to severity of symptoms.  Wife in agreement to plan.    Steffi Carey RN/[place] Nurse Advisor

## 2022-08-01 NOTE — TELEPHONE ENCOUNTER
S-(situation): Fatigue    B-(background):   Symptoms started today  Has not tested for COVID    A-(assessment):   Horrible headache which has not gone away. Took acetaminophen 650 mg at 9 AM.  Can still touch chin to chest    O2 sats: 94-95%  Pulse 103    Extreme fatigue, but can still stand and walk on his own    /90  Temperature 100.2F    Body aches    No SOB at the time of call.  No Chest pain    R-(recommendations):   Advised to take COVID test at home.  Take Ibuprofen 400 mg now  Fluids: 6-10 glasses of water per day  Rest in a dark, quiet room.    Wife will purchase COVID test kit.     ED if with SOB or chest pain, or O2 sats <90%  Pt verbalized understanding.      Steffi Carey RN/[place] Nurse Advisor                              Reason for Disposition    HIGH RISK for severe COVID complications (e.g., weak immune system, age > 64 years, obesity with BMI > 25, pregnant, chronic lung disease or other chronic medical condition) (Exception: Already seen by PCP and no new or worsening symptoms.)    Patient sounds very sick or weak to the triager    SEVERE headache, states 'worst headache' of life    Additional Information    Negative: SEVERE difficulty breathing (e.g., struggling for each breath, speaks in single words)    Negative: Difficult to awaken or acting confused (e.g., disoriented, slurred speech)    Negative: Bluish (or gray) lips or face now    Negative: Shock suspected (e.g., cold/pale/clammy skin, too weak to stand, low BP, rapid pulse)    Negative: Sounds like a life-threatening emergency to the triager    Negative: SEVERE or constant chest pain or pressure  (Exception: Mild central chest pain, present only when coughing.)    Negative: MODERATE difficulty breathing (e.g., speaks in phrases, SOB even at rest, pulse 100-120)    Negative: Headache and stiff neck (can't touch chin to chest)    Negative: Oxygen level (e.g., pulse oximetry) 90 percent or lower    Negative: Chest pain or  pressure    Negative: MILD difficulty breathing (e.g., minimal/no SOB at rest, SOB with walking, pulse <100)    Negative: [1] Fever > 100.0 F (37.8 C) AND [2] bedridden (e.g., nursing home patient, CVA, chronic illness, recovering from surgery)    Negative: [1] Fever > 101 F (38.3 C) AND [2] over 60 years of age    Negative: Fever > 103 F (39.4 C)    Negative: Difficult to awaken or acting confused (e.g., disoriented, slurred speech)    Negative: Weakness of the face, arm or leg on one side of the body and new onset    Negative: Numbness of the face, arm or leg on one side of the body and new onset    Negative: Loss of speech or garbled speech and new onset    Negative: Passed out (i.e., fainted, collapsed and was not responding)    Negative: Sounds like a life-threatening emergency to the triager    Negative: Unable to walk without falling    Negative: Stiff neck (can't touch chin to chest)    Negative: Possibility of carbon monoxide exposure    Protocols used: CORONAVIRUS (COVID-19) DIAGNOSED OR [id number] 1.18.2022, HEADACHE-A-[place]

## 2022-08-05 ENCOUNTER — TELEPHONE (OUTPATIENT)
Dept: BEHAVIORAL HEALTH | Facility: CLINIC | Age: 42
End: 2022-08-05

## 2022-08-09 ENCOUNTER — HOSPITAL ENCOUNTER (OUTPATIENT)
Dept: BEHAVIORAL HEALTH | Facility: CLINIC | Age: 42
Discharge: HOME | End: 2022-08-09
Attending: FAMILY MEDICINE | Admitting: FAMILY MEDICINE
Payer: COMMERCIAL

## 2022-08-09 DIAGNOSIS — F33.9 RECURRENT MAJOR DEPRESSIVE DISORDER, REMISSION STATUS UNSPECIFIED (H): Primary | ICD-10-CM

## 2022-08-09 DIAGNOSIS — R41.840 CONCENTRATION DEFICIT: ICD-10-CM

## 2022-08-09 PROCEDURE — 90791 PSYCH DIAGNOSTIC EVALUATION: CPT | Mod: GT,95 | Performed by: COUNSELOR

## 2022-08-09 ASSESSMENT — ANXIETY QUESTIONNAIRES
GAD7 TOTAL SCORE: 4
GAD7 TOTAL SCORE: 4
1. FEELING NERVOUS, ANXIOUS, OR ON EDGE: SEVERAL DAYS
3. WORRYING TOO MUCH ABOUT DIFFERENT THINGS: NOT AT ALL
7. FEELING AFRAID AS IF SOMETHING AWFUL MIGHT HAPPEN: NOT AT ALL
6. BECOMING EASILY ANNOYED OR IRRITABLE: SEVERAL DAYS
IF YOU CHECKED OFF ANY PROBLEMS ON THIS QUESTIONNAIRE, HOW DIFFICULT HAVE THESE PROBLEMS MADE IT FOR YOU TO DO YOUR WORK, TAKE CARE OF THINGS AT HOME, OR GET ALONG WITH OTHER PEOPLE: NOT DIFFICULT AT ALL
5. BEING SO RESTLESS THAT IT IS HARD TO SIT STILL: SEVERAL DAYS
2. NOT BEING ABLE TO STOP OR CONTROL WORRYING: NOT AT ALL

## 2022-08-09 ASSESSMENT — PATIENT HEALTH QUESTIONNAIRE - PHQ9
5. POOR APPETITE OR OVEREATING: SEVERAL DAYS
10. IF YOU CHECKED OFF ANY PROBLEMS, HOW DIFFICULT HAVE THESE PROBLEMS MADE IT FOR YOU TO DO YOUR WORK, TAKE CARE OF THINGS AT HOME, OR GET ALONG WITH OTHER PEOPLE: SOMEWHAT DIFFICULT
SUM OF ALL RESPONSES TO PHQ QUESTIONS 1-9: 9
SUM OF ALL RESPONSES TO PHQ QUESTIONS 1-9: 9

## 2022-08-09 ASSESSMENT — COLUMBIA-SUICIDE SEVERITY RATING SCALE - C-SSRS
6. HAVE YOU EVER DONE ANYTHING, STARTED TO DO ANYTHING, OR PREPARED TO DO ANYTHING TO END YOUR LIFE?: NO
TOTAL  NUMBER OF ABORTED OR SELF INTERRUPTED ATTEMPTS LIFETIME: NO
2. HAVE YOU ACTUALLY HAD ANY THOUGHTS OF KILLING YOURSELF?: NO
TOTAL  NUMBER OF INTERRUPTED ATTEMPTS LIFETIME: NO
1. HAVE YOU WISHED YOU WERE DEAD OR WISHED YOU COULD GO TO SLEEP AND NOT WAKE UP?: NO
ATTEMPT LIFETIME: NO
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
## 2022-08-10 ENCOUNTER — TELEPHONE (OUTPATIENT)
Dept: BEHAVIORAL HEALTH | Facility: CLINIC | Age: 42
End: 2022-08-10

## 2022-08-10 NOTE — TELEPHONE ENCOUNTER
First attempt to contact pt. Ronir left a VM with TC contact info and encouraged a phone call back to schedule initial therapy appointment. Writer will postpone for tomorrow.    Maddie Ruizra  08/10/22  1026    ----- Message from KELLE Sanches sent at 8/9/2022  9:28 PM CDT -----  Regarding: referral  Transition Clinic Referral   [place] (Limited)        Please Check Type of Referral Requested:       __X__THERAPY: The Transition clinic is able to schedule patients without current medical insurance; these patient will be referred to our Social Work Care Coordinator for Medical Insurance              Assistance. We are open for referral for psychotherapy. Patient is referred from:  Assessment Center      ____MEDICATION:  Referrals for Medication are ONLY accepted from the following areas (select): [facility]                                       Suboxone and Opioid Management Referrals are automatically denied. TC Psychiatry cannot see patient without active medical insurance.         Referring Provider Contact Name: Elena Ha; Phone Number: [phone]    Reason for Transition Clinic Referral: bridge services    Next Level of Care Patient Will Be Transitioned To: individual therapy  Provider(s)[facility]  Location virtual  Date/Time TBS    What Would Be Helpful from the Transition Clinic: bridge services     Needs: NO    Does Patient Have Access to Technology: yes    Patient E-mail Address: [email or web address]    Current Patient Phone Number: [phone];     Clinician Gender Preference (if applicable): NO    KELLE Sanches

## 2022-08-11 ENCOUNTER — TELEPHONE (OUTPATIENT)
Dept: BEHAVIORAL HEALTH | Facility: CLINIC | Age: 42
End: 2022-08-11

## 2022-08-11 NOTE — TELEPHONE ENCOUNTER
Writer spoke with pt and scheduled initial TC therapy appointment on 08/18/22 @  3;00 pm. Writer sent intake documents via [employer]. Writer will reply to referral source.Tracker completed.    Maddie Cr  08/11/22  901    ----- Message from KELLE Sanches sent at 8/9/2022  9:28 PM CDT -----  Regarding: referral  Transition Clinic Referral   [place] (Limited)        Please Check Type of Referral Requested:       __X__THERAPY: The Transition clinic is able to schedule patients without current medical insurance; these patient will be referred to our Social Work Care Coordinator for Medical Insurance              Assistance. We are open for referral for psychotherapy. Patient is referred from:  Assessment Center      ____MEDICATION:  Referrals for Medication are ONLY accepted from the following areas (select): [facility]                                       Suboxone and Opioid Management Referrals are automatically denied. TC Psychiatry cannot see patient without active medical insurance.         Referring Provider Contact Name: Elena Bonner; Phone Number: [phone]    Reason for Transition Clinic Referral: bridge services    Next Level of Care Patient Will Be Transitioned To: individual therapy  Provider(s)[facility]  Location virtual  Date/Time TBS    What Would Be Helpful from the Transition Clinic: bridge services     Needs: NO    Does Patient Have Access to Technology: yes    Patient E-mail Address: [email or web address]    Current Patient Phone Number: [phone];     Clinician Gender Preference (if applicable): NO    KELLE Sanches

## 2022-08-18 ENCOUNTER — VIRTUAL VISIT (OUTPATIENT)
Dept: BEHAVIORAL HEALTH | Facility: CLINIC | Age: 42
End: 2022-08-18

## 2022-08-18 DIAGNOSIS — F33.9 EPISODE OF RECURRENT MAJOR DEPRESSIVE DISORDER, UNSPECIFIED DEPRESSION EPISODE SEVERITY (H): Primary | ICD-10-CM

## 2022-08-18 NOTE — PROGRESS NOTES
[facility]                                    Progress Note    Patient Name: Denny Herrera  Date: 2022         Service Type: Individual      Session Start Time: 1500  Session End Time: 1556     Session Length: 56m    Session #: 001    Attendees: Client attended alone    Service Modality:  Video Visit:      Provider verified identity through the following two step process.  Patient provided:  Patient photo, Patient  and Patient address    Telemedicine Visit: The patient's condition can be safely assessed and treated via synchronous audio and visual telemedicine encounter.      Reason for Telemedicine Visit: Patient has requested telehealth visit    Originating Site (Patient Location): Patient's home    Distant Site (Provider Location): [facility] Clinics: Transition Clinic    Consent:  The patient/guardian has verbally consented to: the potential risks and benefits of telemedicine (video visit) versus in person care; bill my insurance or make self-payment for services provided; and responsibility for payment of non-covered services.     Patient would like the video invitation sent by:  My Chart    Mode of Communication:  Video Conference via Amwell    As the provider I attest to compliance with applicable laws and regulations related to telemedicine.    DATA  Interactive Complexity: No  Crisis: No        Progress Since Last Session (Related to Symptoms / Goals / Homework):   Symptoms: n/a - initial session    Homework: n/a - initial session      Episode of Care Goals: Achieved / completed to satisfaction - PREPARATION (Decided to change - considering how); Intervened by negotiating a change plan and determining options / strategies for behavior change, identifying triggers, exploring social supports, and working towards setting a date to begin behavior change     Current / Ongoing Stressors and Concerns:   Pt referred for bridging services due to extended waitlist for  [facility]. Referral and recent DA suggest ongoing depressive spectrum sx, most notably amotivation, anhedonia, social isolation at times, sporadic hopelessness, depressed mood, and restrictive/negativistic self-talk. In session, pt echoes much of these concerns, citing physical limitations due to multiple knee injuries/surgeries. He goes on to note concerns re: mental climate at baseline, specifically distractibility and anxious sx consistent with those of ADHD in adults. Pt expressed comprehension and acceptance of informed consent and the nature of / limitations to confidentiality due to mandated reporting when reviewed in session.       Treatment Objective(s) Addressed in This Session:   Development of therapeutic rapport and goal identification     Intervention:   Motivational Interviewing: Identified and processed pt's stated/demonstrated readiness and current stage of change, as well as perceived barriers to the same.       ASSESSMENT: Current Emotional / Mental Status (status of significant symptoms):   Risk status (Self / Other harm or suicidal ideation)   Patient denies current fears or concerns for personal safety.   Patient denies current or recent suicidal ideation or behaviors.   Patient denies current or recent homicidal ideation or behaviors.   Patient denies current or recent self injurious behavior or ideation.   Patient denies other safety concerns.   Patient reports there has been no change in risk factors since their last session.     Patient reports there has been no change in protective factors since their last session.     Recommended that patient call 911 or go to the local ED should there be a change in any of these risk factors.     Appearance:   Appropriate    Eye Contact:   Fair    Psychomotor Behavior: Normal    Attitude:   Cooperative    Orientation:   All   Speech    Rate / Production: Normal     Volume:  Normal    Mood:    Anxious  Depressed    Affect:    Appropriate    Thought  Content:  Clear    Thought Form:  Coherent  Logical    Insight:    Good      Medication Review:   No changes to current psychiatric medication(s)     Medication Compliance:   Yes     Changes in Health Issues:   None reported     Chemical Use Review:   Substance Use: Chemical use reviewed, no active concerns identified      Tobacco Use: No current tobacco use.      Diagnosis:  1. F33.9 - Episode of recurrent major depressive disorder, unspecified depression episode severity (H)        Collateral Reports Completed:   Not Applicable    PLAN: (Patient Tasks / Therapist Tasks / Other)  Pt to follow-up on assigned HW and external referrals; continue TC engagement pending pt need.        LAN AMBROCIO, [facility]  08.18.2022

## 2022-08-24 ENCOUNTER — VIRTUAL VISIT (OUTPATIENT)
Dept: BEHAVIORAL HEALTH | Facility: CLINIC | Age: 42
End: 2022-08-24

## 2022-08-24 DIAGNOSIS — F33.9 EPISODE OF RECURRENT MAJOR DEPRESSIVE DISORDER, UNSPECIFIED DEPRESSION EPISODE SEVERITY (H): Primary | ICD-10-CM

## 2022-08-24 NOTE — PROGRESS NOTES
[facility]                                    Progress Note    Patient Name: Denny Herrera  Date: 08.24.2022         Service Type: Individual      Session Start Time: 1300  Session End Time: 1406     Session Length: 66m    Session #: 002    Attendees: Client attended alone    Service Modality:  Video Visit:      Provider verified identity through the following two step process.  Patient provided:  Patient is known previously to provider    Telemedicine Visit: The patient's condition can be safely assessed and treated via synchronous audio and visual telemedicine encounter.      Reason for Telemedicine Visit: Patient has requested telehealth visit    Originating Site (Patient Location): Patient's home    Distant Site (Provider Location): [facility] Clinics: Transition Clinic    Consent:  The patient/guardian has verbally consented to: the potential risks and benefits of telemedicine (video visit) versus in person care; bill my insurance or make self-payment for services provided; and responsibility for payment of non-covered services.     Patient would like the video invitation sent by:  Send to e-mail at: [email or web address]    Mode of Communication:  Video Conference via Amwell    As the provider I attest to compliance with applicable laws and regulations related to telemedicine.    DATA  Interactive Complexity: No  Crisis: No        Progress Since Last Session (Related to Symptoms / Goals / Homework):   Symptoms: Improving - pt notes some improvements to hope for change and goal-directed bx    Homework: Achieved / completed to satisfaction      Episode of Care Goals: Achieved / completed to satisfaction - PREPARATION (Decided to change - considering how); Intervened by negotiating a change plan and determining options / strategies for behavior change, identifying triggers, exploring social supports, and working towards setting a date to begin behavior change     Current /  Ongoing Stressors and Concerns:    Pt referred for bridging services due to extended waitlist for [facility]. Referral and recent DA suggest ongoing depressive spectrum sx, most notably amotivation, anhedonia, social isolation at times, sporadic hopelessness, depressed mood, and restrictive/negativistic self-talk.     Pt presents to session reporting overall persistence of environmental stressors at levels consistent with previous session. Processing of previous HW indicates some chronic communication deficits w/ spouse/others, deficits of self-care, and difficulty effectively advocating for own wants/needs when presented with external variables. He describes sx consistent with ADHD; plans to contact testing referrals provided in previous [employer] message. Some past-oriented thought, framing his perception of everyday life through perceived losses of fx, as contrasted with developing new meaningful activities and goals. Pt receptive to therapeutic tools and concepts presented in session.     Treatment Objective(s) Addressed in This Session:   Identify negative self-talk and behaviors: challenge core beliefs, myths, and actions  Improve concentration, focus, and mindfulness in daily activities        Intervention:   Chas: Processed previous HW and finalized initial tx goals       ASSESSMENT: Current Emotional / Mental Status (status of significant symptoms):   Risk status (Self / Other harm or suicidal ideation)   Patient denies current fears or concerns for personal safety.   Patient denies current or recent suicidal ideation or behaviors.   Patient denies current or recent homicidal ideation or behaviors.   Patient denies current or recent self injurious behavior or ideation.   Patient denies other safety concerns.   Patient reports there has been no change in risk factors since their last session.     Patient reports there has been no change in protective factors since their last session.     Recommended that patient  call 911 or go to the local ED should there be a change in any of these risk factors.     Appearance:   Appropriate    Eye Contact:   Good    Psychomotor Behavior: Normal    Attitude:   Cooperative    Orientation:   All   Speech    Rate / Production: Normal     Volume:  Normal    Mood:    Normal   Affect:    Appropriate    Thought Content:  Clear    Thought Form:  Coherent  Logical    Insight:    Good      Medication Review:   No changes to current psychiatric medication(s)     Medication Compliance:   Yes     Changes in Health Issues:   None reported     Chemical Use Review:   Substance Use: Chemical use reviewed, no active concerns identified      Tobacco Use: No current tobacco use.      Diagnosis:  1. F33.9 - Episode of recurrent major depressive disorder, unspecified depression episode severity (H)        Collateral Reports Completed:   Not Applicable    PLAN: (Patient Tasks / Therapist Tasks / Other)  Pt to follow-up on assigned HW; continue TC engagement pending pt need.        [facility]                                                         ______________________________________________________________________    Individual Treatment Plan    Patient's Name: Denny Herrera  YOB: 1980    Date of Creation: 08.24.2022  Date Treatment Plan Last Reviewed/Revised: 08.24.2022    DSM5 Diagnoses: F33.9 - MaJor Depressive d/o, recurrent, unspecified  Psychosocial / Contextual Factors: medical comorbidities / limitations of fx, relational strain, stage of life changes, ongoing effects of COVID-19 pandemic  PROMIS (reviewed every 90 days): pending    Referral / Collaboration:  In place prior to TC engagement.    Anticipated number of session for this episode of care: TBD pending need and waitlist  Anticipation frequency of session: Weekly  Anticipated Duration of each session: 53 or more minutes  Treatment plan will be reviewed in 90 days or when goals have been changed.        MeasurableTreatment Goal(s) related to diagnosis / functional impairment(s)  Goal 1: Patient will effectively utilize learned coping strategies to moderate mood-related sx at 8/10 opportunities and self-report improved subjective QoL.    Objective #A (Patient Action)    Patient will use cognitive strategies identified in therapy to challenge anxious thoughts.  Status: New - Date: 08.24.2022     Intervention(s)  Therapist will teach distraction skills.  .    Objective #B  Patient will Increase interest, engagement, and pleasure in doing things  Identify negative self-talk and behaviors: challenge core beliefs, myths, and actions.  Status: New - Date: 08.24.2022     Intervention(s)  Therapist will assign homework to operationalize tools and concepts presented in session.    Objective #C  Patient will Improve concentration, focus, and mindfulness in daily activities .  Status: New - Date: 08.24.2022     Intervention(s)  Therapist will teach emotional regulation skills.  .      Patient has reviewed and agreed to the above plan.      LAN AMBROCIO, [facility]  August 24, 2022

## 2022-09-01 ENCOUNTER — NURSE TRIAGE (OUTPATIENT)
Dept: FAMILY MEDICINE | Facility: CLINIC | Age: 42
End: 2022-09-01

## 2022-09-01 NOTE — TELEPHONE ENCOUNTER
[facility].     Blood pressure  128/82 lying down .     Sitting 148/106    Standing 154/114    Patient did standing B/P 171/122 now today.     Patient is on B/P  medication. [facility] did not want to make any changes to his blood pressure medication.   [facility]  did tell him to follow up with his PCP.     Patient was given appointment next week for blood pressure evaluation. He was instructed to take blood pressure once a day sitting. He will remain well hydrated and avoid salty food.     No other symptoms today.           Reason for Disposition    Systolic BP  >= 160 OR Diastolic >= 100    Healthy diet, questions about    Low salt diet, questions about    Additional Information    Negative: Difficult to awaken or acting confused (e.g., disoriented, slurred speech)    Negative: SEVERE difficulty breathing (e.g., struggling for each breath, speaks in single words)    Negative: [1] Weakness of the face, arm or leg on one side of the body AND [2] new-onset    Negative: [1] Numbness (i.e., loss of sensation) of the face, arm or leg on one side of the body AND [2] new-onset    Negative: [1] Chest pain lasts > 5 minutes AND [2] history of heart disease (i.e., heart attack, bypass surgery, angina, angioplasty, CHF)    Negative: [1] Chest pain AND [2] took nitrogylcerin AND [3] pain was not relieved    Negative: Sounds like a life-threatening emergency to the triager    Negative: Symptom is main concern (e.g., headache, chest pain)    Negative: Low blood pressure is main concern    Negative: [1] Systolic BP  >= 160 OR Diastolic >= 100 AND [2] cardiac or neurologic symptoms (e.g., chest pain, difficulty breathing, unsteady gait, blurred vision)    Negative: [1] Pregnant 20 or more weeks (or postpartum < 6 weeks) AND [2] new hand or face swelling    Negative: [1] Pregnant 20 or more weeks (or postpartum < 6 weeks) AND [2] Systolic BP >= 160 OR Diastolic >= 100    Negative: [1] Systolic BP  >= 200 OR  "Diastolic >= 120 AND [2] having NO cardiac or neurologic symptoms    Negative: [1] Pregnant 20 or more weeks (or postpartum < 6 weeks) AND [2] Systolic BP  >= 140 OR Diastolic >= 90    Negative: [1] Systolic BP  >= 180 OR Diastolic >= 110 AND [2] missed most recent dose of blood pressure medication    Negative: Systolic BP  >= 180 OR Diastolic >= 110    Negative: Ran out of BP medications    Negative: [1] Taking BP medications AND [2] feels is having side effects (e.g., impotence, cough, dizzy upon standing)    Negative: [1] Systolic BP  >= 130 OR Diastolic >= 80 AND [2] pregnant    Negative: [1] Systolic BP  >= 130 OR Diastolic >= 80 AND [2] taking BP medications    Negative: [1] Systolic BP  >= 130 OR Diastolic >= 80 AND [2] not taking BP medications    Negative: [1] Systolic BP  >= 130 OR Diastolic >= 80 AND [2] history of heart problems, kidney disease or diabetes    Negative: Wants doctor to measure BP    Negative: [1] Systolic BP  < 130 with Diastolic < 80 AND [2] taking BP medications    Negative: Systolic BP between 120-129 with Diastolic < 80    Negative: Systolic BP  < 120 with Diastolic < 80    Answer Assessment - Initial Assessment Questions  1. BLOOD PRESSURE: \"What is the blood pressure?\" \"Did you take at least two measurements 5 minutes apart?\"      See notes.   2. ONSET: \"When did you take your blood pressure?\"      This am   3. HOW: \"How did you obtain the blood pressure?\" (e.g., visiting nurse, automatic home BP monitor)      [facility]  4. HISTORY: \"Do you have a history of high blood pressure?\"      Yes  5. MEDICATIONS: \"Are you taking any medications for blood pressure?\" \"Have you missed any doses recently?\"      Yes  6. OTHER SYMPTOMS: \"Do you have any symptoms?\" (e.g., headache, chest pain, blurred vision, difficulty breathing, weakness)      No  7. PREGNANCY: \"Is there any chance you are pregnant?\" \"When was your last menstrual period?\"      No    Protocols used: BLOOD PRESSURE - HIGH-A-AH    Mary Beth " ADOLFO Corona  [facility]

## 2022-09-02 NOTE — TELEPHONE ENCOUNTER
Dr. Martínez, I tried to reach the patient again this morning to see what his blood pressure reading was today.     Left message to call us.     Based on documentation from yesterday, do you want to make adjustments to his current blood pressure medication Lisinopril?     He has the appointment with you next week to come into the office for blood pressure concern.     Mary Beth Corona RN  [facility]

## 2022-09-08 ENCOUNTER — OFFICE VISIT (OUTPATIENT)
Dept: FAMILY MEDICINE | Facility: CLINIC | Age: 42
End: 2022-09-08
Payer: COMMERCIAL

## 2022-09-08 VITALS
HEART RATE: 80 BPM | DIASTOLIC BLOOD PRESSURE: 98 MMHG | OXYGEN SATURATION: 98 % | WEIGHT: 261 LBS | HEIGHT: 72 IN | RESPIRATION RATE: 14 BRPM | BODY MASS INDEX: 35.35 KG/M2 | SYSTOLIC BLOOD PRESSURE: 149 MMHG | TEMPERATURE: 97.8 F

## 2022-09-08 DIAGNOSIS — E78.1 HYPERTRIGLYCERIDEMIA: Primary | ICD-10-CM

## 2022-09-08 DIAGNOSIS — R10.32 LLQ ABDOMINAL PAIN: ICD-10-CM

## 2022-09-08 DIAGNOSIS — E66.01 MORBID OBESITY (H): ICD-10-CM

## 2022-09-08 DIAGNOSIS — R53.83 OTHER FATIGUE: ICD-10-CM

## 2022-09-08 DIAGNOSIS — I10 BENIGN ESSENTIAL HYPERTENSION: ICD-10-CM

## 2022-09-08 LAB
ALBUMIN SERPL-MCNC: 4.2 G/DL (ref 3.4–5)
ALP SERPL-CCNC: 34 U/L (ref 40–150)
ALT SERPL W P-5'-P-CCNC: 89 U/L (ref 0–70)
ANION GAP SERPL CALCULATED.3IONS-SCNC: 7 MMOL/L (ref 3–14)
AST SERPL W P-5'-P-CCNC: 48 U/L (ref 0–45)
BILIRUB SERPL-MCNC: 0.5 MG/DL (ref 0.2–1.3)
BUN SERPL-MCNC: 13 MG/DL (ref 7–30)
CALCIUM SERPL-MCNC: 10.1 MG/DL (ref 8.5–10.1)
CHLORIDE BLD-SCNC: 103 MMOL/L (ref 94–109)
CHOLEST SERPL-MCNC: 164 MG/DL
CO2 SERPL-SCNC: 27 MMOL/L (ref 20–32)
CREAT SERPL-MCNC: 0.84 MG/DL (ref 0.66–1.25)
ERYTHROCYTE [DISTWIDTH] IN BLOOD BY AUTOMATED COUNT: 13.1 % (ref 10–15)
ERYTHROCYTE [SEDIMENTATION RATE] IN BLOOD BY WESTERGREN METHOD: 9 MM/HR (ref 0–15)
FASTING STATUS PATIENT QL REPORTED: ABNORMAL
GFR SERPL CREATININE-BSD FRML MDRD: >90 ML/MIN/1.73M2
GLUCOSE BLD-MCNC: 111 MG/DL (ref 70–99)
HCT VFR BLD AUTO: 43.4 % (ref 40–53)
HDLC SERPL-MCNC: 30 MG/DL
HGB BLD-MCNC: 14.8 G/DL (ref 13.3–17.7)
LDLC SERPL CALC-MCNC: 84 MG/DL
MCH RBC QN AUTO: 30.9 PG (ref 26.5–33)
MCHC RBC AUTO-ENTMCNC: 34.1 G/DL (ref 31.5–36.5)
MCV RBC AUTO: 91 FL (ref 78–100)
NONHDLC SERPL-MCNC: 134 MG/DL
PLATELET # BLD AUTO: 247 10E3/UL (ref 150–450)
POTASSIUM BLD-SCNC: 4.3 MMOL/L (ref 3.4–5.3)
PROT SERPL-MCNC: 8.5 G/DL (ref 6.8–8.8)
RBC # BLD AUTO: 4.79 10E6/UL (ref 4.4–5.9)
SODIUM SERPL-SCNC: 137 MMOL/L (ref 133–144)
TRIGL SERPL-MCNC: 252 MG/DL
TSH SERPL DL<=0.005 MIU/L-ACNC: 2 MU/L (ref 0.4–4)
WBC # BLD AUTO: 7.2 10E3/UL (ref 4–11)

## 2022-09-08 PROCEDURE — 36415 COLL VENOUS BLD VENIPUNCTURE: CPT | Performed by: FAMILY MEDICINE

## 2022-09-08 PROCEDURE — 80061 LIPID PANEL: CPT | Performed by: FAMILY MEDICINE

## 2022-09-08 PROCEDURE — 99214 OFFICE O/P EST MOD 30 MIN: CPT | Performed by: FAMILY MEDICINE

## 2022-09-08 PROCEDURE — 84443 ASSAY THYROID STIM HORMONE: CPT | Performed by: FAMILY MEDICINE

## 2022-09-08 PROCEDURE — 85027 COMPLETE CBC AUTOMATED: CPT | Performed by: FAMILY MEDICINE

## 2022-09-08 PROCEDURE — 82306 VITAMIN D 25 HYDROXY: CPT | Performed by: FAMILY MEDICINE

## 2022-09-08 PROCEDURE — 80053 COMPREHEN METABOLIC PANEL: CPT | Performed by: FAMILY MEDICINE

## 2022-09-08 PROCEDURE — 85652 RBC SED RATE AUTOMATED: CPT | Performed by: FAMILY MEDICINE

## 2022-09-08 RX ORDER — LISINOPRIL 20 MG/1
20 TABLET ORAL DAILY
Qty: 90 TABLET | Refills: 1 | Status: SHIPPED | OUTPATIENT
Start: 2022-09-08 | End: 2022-11-23

## 2022-09-08 ASSESSMENT — PAIN SCALES - GENERAL: PAINLEVEL: MILD PAIN (3)

## 2022-09-08 NOTE — LETTER
September 13, 2022      Denny SALINAS Javier  [address] [address]        Dear ,    We are writing to inform you of your test results.    I have reviewed your recent labs. Here are the results:     -Vitamin D level is normal and getting 1000 IU daily in your diet or supplements is recommended.   - Thyroid levels are with in normal range.       Resulted Orders   CBC with platelets   Result Value Ref Range    WBC Count 7.2 4.0 - 11.0 10e3/uL    RBC Count 4.79 4.40 - 5.90 10e6/uL    Hemoglobin 14.8 13.3 - 17.7 g/dL    Hematocrit 43.4 40.0 - 53.0 %    MCV 91 78 - 100 fL    MCH 30.9 26.5 - 33.0 pg    MCHC 34.1 31.5 - 36.5 g/dL    RDW 13.1 10.0 - 15.0 %    Platelet Count 247 150 - 450 10e3/uL   TSH   Result Value Ref Range    TSH 2.00 0.40 - 4.00 mU/L   Vitamin D Deficiency   Result Value Ref Range    Vitamin D, Total (25-Hydroxy) 35 20 - 75 ug/L    Narrative    Season, race, dietary intake, and treatment affect the concentration of 25-hydroxy-Vitamin D. Values may decrease during winter months and increase during summer months. Values 20-29 ug/L may indicate Vitamin D insufficiency and values <20 ug/L may indicate Vitamin D deficiency.    Vitamin D determination is routinely performed by an immunoassay specific for 25 hydroxyvitamin D3.  If an individual is on vitamin D2(ergocalciferol) supplementation, please specify 25 OH vitamin D2 and D3 level determination by LCMSMS test VITD23.     Comprehensive metabolic panel (BMP + Alb, Alk Phos, ALT, AST, Total. Bili, TP)   Result Value Ref Range    Sodium 137 133 - 144 mmol/L    Potassium 4.3 3.4 - 5.3 mmol/L    Chloride 103 94 - 109 mmol/L    Carbon Dioxide (CO2) 27 20 - 32 mmol/L    Anion Gap 7 3 - 14 mmol/L    Urea Nitrogen 13 7 - 30 mg/dL    Creatinine 0.84 0.66 - 1.25 mg/dL    Calcium 10.1 8.5 - 10.1 mg/dL    Glucose 111 (H) 70 - 99 mg/dL    Alkaline Phosphatase 34 (L) 40 - 150 U/L    AST 48 (H) 0 - 45 U/L    ALT 89 (H) 0 - 70 U/L    Protein Total 8.5 6.8 -  8.8 g/dL    Albumin 4.2 3.4 - 5.0 g/dL    Bilirubin Total 0.5 0.2 - 1.3 mg/dL    GFR Estimate >90 >60 mL/min/1.73m2      Comment:      Effective December 21, 2021 eGFRcr in adults is calculated using the 2021 CKD-EPI creatinine equation which includes age and gender (Sandie becker al., NEJ, DOI: 10.1056/TVKFvf7502031)   ESR: Erythrocyte sedimentation rate   Result Value Ref Range    Erythrocyte Sedimentation Rate 9 0 - 15 mm/hr   Lipid Profile   Result Value Ref Range    Cholesterol 164 <200 mg/dL    Triglycerides 252 (H) <150 mg/dL    Direct Measure HDL 30 (L) >=40 mg/dL    LDL Cholesterol Calculated 84 <=100 mg/dL    Non HDL Cholesterol 134 (H) <130 mg/dL    Patient Fasting > 8hrs? Unknown     Narrative    Cholesterol  Desirable:  <200 mg/dL    Triglycerides  Normal:  Less than 150 mg/dL  Borderline High:  150-199 mg/dL  High:  200-499 mg/dL  Very High:  Greater than or equal to 500 mg/dL    Direct Measure HDL  Female:  Greater than or equal to 50 mg/dL   Male:  Greater than or equal to 40 mg/dL    LDL Cholesterol  Desirable:  <100mg/dL  Above Desirable:  100-129 mg/dL   Borderline High:  130-159 mg/dL   High:  160-189 mg/dL   Very High:  >= 190 mg/dL    Non HDL Cholesterol  Desirable:  130 mg/dL  Above Desirable:  130-159 mg/dL  Borderline High:  160-189 mg/dL  High:  190-219 mg/dL  Very High:  Greater than or equal to 220 mg/dL       If you have any questions or concerns, please call the clinic at the number listed above.       Sincerely,      Ayo Martínez MD

## 2022-09-08 NOTE — PROGRESS NOTES
"  Assessment & Plan     Hypertriglyceridemia    - Lipid Profile; Future  - Lipid Profile    Benign essential hypertension  Blood pressure is elevated recheck was also elevated we discussed about changing some lifestyle but also increase the dose of lisinopril .  We will increase the dose of lisinopril to 20 mg from 10 mg he can continue to finish his 10 mg dose but double at home.  He will follow-up in 3 months for recheck of his blood pressure.  He is advised to check numbers at home as well.    - CBC with platelets; Future  - TSH; Future  - Vitamin D Deficiency; Future  - Comprehensive metabolic panel (BMP + Alb, Alk Phos, ALT, AST, Total. Bili, TP); Future  - ESR: Erythrocyte sedimentation rate; Future  - CBC with platelets  - TSH  - Vitamin D Deficiency  - Comprehensive metabolic panel (BMP + Alb, Alk Phos, ALT, AST, Total. Bili, TP)  - ESR: Erythrocyte sedimentation rate  - lisinopril (ZESTRIL) 20 MG tablet; Take 1 tablet (20 mg) by mouth daily    Other fatigue    - ESR: Erythrocyte sedimentation rate; Future  - ESR: Erythrocyte sedimentation rate    LLQ abdominal pain  Patient continued to complain of left lower quadrant pain his x-ray is stable he denies any constipation.  Other etiologies including diverticulosis versus other etiology were discussed.  I suggested to further evaluate we need to do a CT scan.  Which is ordered.  If any acute pain worsening pain before that he needs to report to the ER immediately.  Denies any other concerns.  - XR Abdomen 2 Views; Future  - CT Abdomen Pelvis w Contrast; Future    Morbid obesity (H)                 BMI:   Estimated body mass index is 35.16 kg/m  as calculated from the following:    Height as of this encounter: 1.835 m (6' 0.24\").    Weight as of this encounter: 118.4 kg (261 lb).           Return in about 3 months (around 12/8/2022) for Physical Exam.    Ayo Martínez MD  [facility]   Denny is a 42 year old, presenting for " "the following health issues:  Hypertension (Not feeling well)    Patient came today for evaluation of not feeling well.  Apparently he has COVID almost 1-1/2-month ago since then his blood pressure has been on the higher side.  Currently on lisinopril 10 mg.  He feels pressure in the head and sometimes headache.  It is slowly getting better but overall he feels his blood pressure is not well controlled since he has a diagnosis of COVID almost 1 and half months ago.  Other medical conditions include depression and anxiety which is stable.  Takes Wellbutrin for that.  Also takes medication for high triglycerides.  Lifestyle is somewhat sedentary.  In the past his blood pressure has been stable.    And also complainsof discomfort in the left lower quadrant.  There is no constipation diarrhea abdominal pain is not associated with any nausea.  There is no black stools.  No recent trauma.      Review of Systems   Constitutional, HEENT, cardiovascular, pulmonary, gi and gu systems are negative, except as otherwise noted.      Objective    BP (!) 149/98   Pulse 80   Temp 97.8  F (36.6  C) (Tympanic)   Resp 14   Ht 1.835 m (6' 0.24\")   Wt 118.4 kg (261 lb)   SpO2 98%   BMI 35.16 kg/m    Body mass index is 35.16 kg/m .  Physical Exam   GENERAL: healthy, alert and no distress  NECK: no adenopathy, no asymmetry, masses, or scars and thyroid normal to palpation  RESP: lungs clear to auscultation - no rales, rhonchi or wheezes  CV: regular rate and rhythm, normal S1 S2, no S3 or S4, no murmur, click or rub, no peripheral edema and peripheral pulses strong  ABDOMEN: soft, nontender, no hepatosplenomegaly, bowel sounds are present.  No focal tenderness currently.  MS: no gross musculoskeletal defects noted, no edema              "

## 2022-09-08 NOTE — LETTER
September 9, 2022      Denny SALINAS Herrera  [address] [facility] [address]        Dear ,    We are writing to inform you of your test results.    I have reviewed your recent labs. Here are the results:     -Normal red blood cell (hgb) levels, normal white blood cell count and normal platelet levels.   -TSH (thyroid stimulating hormone) level is normal which indicates normal thyroid function.   -ESR which is test for inflammation is also normal.   -Your cholesterol numbers are stable your triglycerides are slightly elevated however your LDL and HDL are stable.  I would suggest continue your cholesterol medication   -Liver and kidney functions are stable.  Liver functions are elevated however improved from the previous labs.   -Please do the CT scan if symptoms continue follow-up if any change in symptoms.    Resulted Orders   CBC with platelets   Result Value Ref Range    WBC Count 7.2 4.0 - 11.0 10e3/uL    RBC Count 4.79 4.40 - 5.90 10e6/uL    Hemoglobin 14.8 13.3 - 17.7 g/dL    Hematocrit 43.4 40.0 - 53.0 %    MCV 91 78 - 100 fL    MCH 30.9 26.5 - 33.0 pg    MCHC 34.1 31.5 - 36.5 g/dL    RDW 13.1 10.0 - 15.0 %    Platelet Count 247 150 - 450 10e3/uL   TSH   Result Value Ref Range    TSH 2.00 0.40 - 4.00 mU/L   Comprehensive metabolic panel (BMP + Alb, Alk Phos, ALT, AST, Total. Bili, TP)   Result Value Ref Range    Sodium 137 133 - 144 mmol/L    Potassium 4.3 3.4 - 5.3 mmol/L    Chloride 103 94 - 109 mmol/L    Carbon Dioxide (CO2) 27 20 - 32 mmol/L    Anion Gap 7 3 - 14 mmol/L    Urea Nitrogen 13 7 - 30 mg/dL    Creatinine 0.84 0.66 - 1.25 mg/dL    Calcium 10.1 8.5 - 10.1 mg/dL    Glucose 111 (H) 70 - 99 mg/dL    Alkaline Phosphatase 34 (L) 40 - 150 U/L    AST 48 (H) 0 - 45 U/L    ALT 89 (H) 0 - 70 U/L    Protein Total 8.5 6.8 - 8.8 g/dL    Albumin 4.2 3.4 - 5.0 g/dL    Bilirubin Total 0.5 0.2 - 1.3 mg/dL    GFR Estimate >90 >60 mL/min/1.73m2      Comment:      Effective December 21, 2021 eGFRcr in adults is  calculated using the 2021 CKD-EPI creatinine equation which includes age and gender (Sandie becker al., NEJ, DOI: 10.1056/APSRep2582209)   ESR: Erythrocyte sedimentation rate   Result Value Ref Range    Erythrocyte Sedimentation Rate 9 0 - 15 mm/hr   Lipid Profile   Result Value Ref Range    Cholesterol 164 <200 mg/dL    Triglycerides 252 (H) <150 mg/dL    Direct Measure HDL 30 (L) >=40 mg/dL    LDL Cholesterol Calculated 84 <=100 mg/dL    Non HDL Cholesterol 134 (H) <130 mg/dL    Patient Fasting > 8hrs? Unknown     Narrative    Cholesterol  Desirable:  <200 mg/dL    Triglycerides  Normal:  Less than 150 mg/dL  Borderline High:  150-199 mg/dL  High:  200-499 mg/dL  Very High:  Greater than or equal to 500 mg/dL    Direct Measure HDL  Female:  Greater than or equal to 50 mg/dL   Male:  Greater than or equal to 40 mg/dL    LDL Cholesterol  Desirable:  <100mg/dL  Above Desirable:  100-129 mg/dL   Borderline High:  130-159 mg/dL   High:  160-189 mg/dL   Very High:  >= 190 mg/dL    Non HDL Cholesterol  Desirable:  130 mg/dL  Above Desirable:  130-159 mg/dL  Borderline High:  160-189 mg/dL  High:  190-219 mg/dL  Very High:  Greater than or equal to 220 mg/dL       If you have any questions or concerns, please call the clinic at the number listed above.       Sincerely,      Ayo Martínez MD

## 2022-09-09 LAB — DEPRECATED CALCIDIOL+CALCIFEROL SERPL-MC: 35 UG/L (ref 20–75)

## 2022-09-14 ENCOUNTER — VIRTUAL VISIT (OUTPATIENT)
Dept: BEHAVIORAL HEALTH | Facility: CLINIC | Age: 42
End: 2022-09-14

## 2022-09-14 DIAGNOSIS — F33.9 EPISODE OF RECURRENT MAJOR DEPRESSIVE DISORDER, UNSPECIFIED DEPRESSION EPISODE SEVERITY (H): Primary | ICD-10-CM

## 2022-09-15 ENCOUNTER — MYC MEDICAL ADVICE (OUTPATIENT)
Dept: FAMILY MEDICINE | Facility: CLINIC | Age: 42
End: 2022-09-15

## 2022-09-15 DIAGNOSIS — U09.9 POST-COVID-19 SYNDROME MANIFESTING AS CHRONIC HEADACHE: Primary | ICD-10-CM

## 2022-09-15 DIAGNOSIS — G89.29 POST-COVID-19 SYNDROME MANIFESTING AS CHRONIC HEADACHE: Primary | ICD-10-CM

## 2022-09-15 DIAGNOSIS — R51.9 POST-COVID-19 SYNDROME MANIFESTING AS CHRONIC HEADACHE: Primary | ICD-10-CM

## 2022-09-15 NOTE — PROGRESS NOTES
[facility]                                    Progress Note    Patient Name: Denny Herrera  Date: 09.21.2022         Service Type: Individual      Session Start Time: 1300  Session End Time: 1345     Session Length: 45m    Session #: 003    Attendees: Client attended alone    Service Modality:  Video Visit:      Provider verified identity through the following two step process.  Patient provided:  Patient is known previously to provider    Telemedicine Visit: The patient's condition can be safely assessed and treated via synchronous audio and visual telemedicine encounter.      Reason for Telemedicine Visit: Patient has requested telehealth visit    Originating Site (Patient Location): Patient's home    Distant Site (Provider Location): [facility]    Consent:  The patient/guardian has verbally consented to: the potential risks and benefits of telemedicine (video visit) versus in person care; bill my insurance or make self-payment for services provided; and responsibility for payment of non-covered services.     Patient would like the video invitation sent by:  My Chart    Mode of Communication:  Video Conference via Amwell    As the provider I attest to compliance with applicable laws and regulations related to telemedicine.    DATA  Interactive Complexity: No  Crisis: No        Progress Since Last Session (Related to Symptoms / Goals / Homework):   Symptoms: Worsening - Pt notes disruptions to overall mood and consistency of fx correlated with recent onset of chronic headaches and fatigue    Homework: Partially completed      Episode of Care Goals: Satisfactory progress - ACTION (Actively working towards change); Intervened by reinforcing change plan / affirming steps taken     Current / Ongoing Stressors and Concerns:   Pt referred for bridging services due to extended waitlist for [facility]. Referral and recent DA suggest ongoing depressive spectrum sx,  "most notably amotivation, anhedonia, social isolation at times, sporadic hopelessness, depressed mood, and restrictive/negativistic self-talk.     Pt presents to session following extended absence correlated with physical illness. Pt reports increased difficulty concentrating and consistently / effectively meeting the tasks of everyday living due to what his physicians are describing as \"long covid\" sx. Diminished perceived quality of life correlated with these experiences, as well as their impact upon household fx and relationship with spouse / children due to limitations and decreased frustration tolerance. He does recognize the need for temporary adjustments to expectations of/for self, and has difficulty consistently independently reframing and challenging limiting beliefs. Pt receptive to therapeutic tools and concepts presented.     Treatment Objective(s) Addressed in This Session:   Decrease frequency and intensity of feeling down, depressed, hopeless  Improve concentration, focus, and mindfulness in daily activities        Intervention:   CBT: Identified, processed, and reframed restrictive / maladaptive thinking in re: perceived limitations to fx and potential avenues for tx / change       ASSESSMENT: Current Emotional / Mental Status (status of significant symptoms):   Risk status (Self / Other harm or suicidal ideation)   Patient denies current fears or concerns for personal safety.   Patient denies current or recent suicidal ideation or behaviors.   Patient denies current or recent homicidal ideation or behaviors.   Patient denies current or recent self injurious behavior or ideation.   Patient denies other safety concerns.   Patient reports there has been no change in risk factors since their last session.     Patient reports there has been no change in protective factors since their last session.     Recommended that patient call 911 or go to the local ED should there be a change in any of these risk " factors.     Appearance:   Appropriate    Eye Contact:   Good    Psychomotor Behavior: Normal    Attitude:   Cooperative    Orientation:   All   Speech    Rate / Production: Normal     Volume:  Normal    Mood:    Anxious  Sad    Affect:    Blunted    Thought Content:  Clear    Thought Form:  Coherent  Logical    Insight:    Good      Medication Review:   No changes to current psychiatric medication(s)     Medication Compliance:   Yes     Changes in Health Issues:   Refer to chart     Chemical Use Review:   Substance Use: Chemical use reviewed, no active concerns identified      Tobacco Use: No current tobacco use.      Diagnosis:  1. F33.9 - Episode of recurrent major depressive disorder, unspecified depression episode severity (H)        Collateral Reports Completed:   Not Applicable    PLAN: (Patient Tasks / Therapist Tasks / Other)  Pt to follow-up on assigned HW; continue TC engagement pending pt need.        LAN AMBROCIO, [facility]  09.21.2022

## 2022-09-17 ENCOUNTER — NURSE TRIAGE (OUTPATIENT)
Dept: NURSING | Facility: CLINIC | Age: 42
End: 2022-09-17

## 2022-09-17 ENCOUNTER — HOSPITAL ENCOUNTER (EMERGENCY)
Facility: CLINIC | Age: 42
Discharge: HOME | End: 2022-09-18
Attending: EMERGENCY MEDICINE | Admitting: EMERGENCY MEDICINE
Payer: COMMERCIAL

## 2022-09-17 ENCOUNTER — APPOINTMENT (OUTPATIENT)
Dept: MRI IMAGING | Facility: CLINIC | Age: 42
End: 2022-09-17
Attending: EMERGENCY MEDICINE
Payer: COMMERCIAL

## 2022-09-17 DIAGNOSIS — R51.9 NONINTRACTABLE HEADACHE, UNSPECIFIED CHRONICITY PATTERN, UNSPECIFIED HEADACHE TYPE: ICD-10-CM

## 2022-09-17 LAB
ANION GAP SERPL CALCULATED.3IONS-SCNC: 6 MMOL/L (ref 3–14)
ATRIAL RATE - MUSE: 86 BPM
BASOPHILS # BLD AUTO: 0.1 10E3/UL (ref 0–0.2)
BASOPHILS NFR BLD AUTO: 1 %
BUN SERPL-MCNC: 16 MG/DL (ref 7–30)
CALCIUM SERPL-MCNC: 10 MG/DL (ref 8.5–10.1)
CHLORIDE BLD-SCNC: 104 MMOL/L (ref 94–109)
CO2 SERPL-SCNC: 30 MMOL/L (ref 20–32)
CREAT SERPL-MCNC: 0.89 MG/DL (ref 0.66–1.25)
D DIMER PPP FEU-MCNC: 0.34 UG/ML FEU (ref 0–0.5)
DIASTOLIC BLOOD PRESSURE - MUSE: NORMAL MMHG
EOSINOPHIL # BLD AUTO: 0.2 10E3/UL (ref 0–0.7)
EOSINOPHIL NFR BLD AUTO: 2 %
ERYTHROCYTE [DISTWIDTH] IN BLOOD BY AUTOMATED COUNT: 12.5 % (ref 10–15)
GFR SERPL CREATININE-BSD FRML MDRD: >90 ML/MIN/1.73M2
GLUCOSE BLD-MCNC: 118 MG/DL (ref 70–99)
HCT VFR BLD AUTO: 46.9 % (ref 40–53)
HGB BLD-MCNC: 16.2 G/DL (ref 13.3–17.7)
IMM GRANULOCYTES # BLD: 0 10E3/UL
IMM GRANULOCYTES NFR BLD: 0 %
INTERPRETATION ECG - MUSE: NORMAL
LYMPHOCYTES # BLD AUTO: 2.5 10E3/UL (ref 0.8–5.3)
LYMPHOCYTES NFR BLD AUTO: 28 %
MCH RBC QN AUTO: 31.2 PG (ref 26.5–33)
MCHC RBC AUTO-ENTMCNC: 34.5 G/DL (ref 31.5–36.5)
MCV RBC AUTO: 90 FL (ref 78–100)
MONOCYTES # BLD AUTO: 0.7 10E3/UL (ref 0–1.3)
MONOCYTES NFR BLD AUTO: 8 %
NEUTROPHILS # BLD AUTO: 5.4 10E3/UL (ref 1.6–8.3)
NEUTROPHILS NFR BLD AUTO: 61 %
NRBC # BLD AUTO: 0 10E3/UL
NRBC BLD AUTO-RTO: 0 /100
P AXIS - MUSE: 48 DEGREES
PLATELET # BLD AUTO: 264 10E3/UL (ref 150–450)
POTASSIUM BLD-SCNC: 3.7 MMOL/L (ref 3.4–5.3)
PR INTERVAL - MUSE: 190 MS
QRS DURATION - MUSE: 86 MS
QT - MUSE: 350 MS
QTC - MUSE: 418 MS
R AXIS - MUSE: 53 DEGREES
RBC # BLD AUTO: 5.19 10E6/UL (ref 4.4–5.9)
SODIUM SERPL-SCNC: 140 MMOL/L (ref 133–144)
SYSTOLIC BLOOD PRESSURE - MUSE: NORMAL MMHG
T AXIS - MUSE: 63 DEGREES
TROPONIN I SERPL HS-MCNC: 5 NG/L
VENTRICULAR RATE- MUSE: 86 BPM
WBC # BLD AUTO: 8.9 10E3/UL (ref 4–11)

## 2022-09-17 PROCEDURE — 93005 ELECTROCARDIOGRAM TRACING: CPT | Mod: 76

## 2022-09-17 PROCEDURE — 80048 BASIC METABOLIC PNL TOTAL CA: CPT | Performed by: EMERGENCY MEDICINE

## 2022-09-17 PROCEDURE — 99285 EMERGENCY DEPT VISIT HI MDM: CPT | Mod: 25

## 2022-09-17 PROCEDURE — 255N000002 HC RX 255 OP 636: Performed by: EMERGENCY MEDICINE

## 2022-09-17 PROCEDURE — 85004 AUTOMATED DIFF WBC COUNT: CPT | Performed by: EMERGENCY MEDICINE

## 2022-09-17 PROCEDURE — 93005 ELECTROCARDIOGRAM TRACING: CPT

## 2022-09-17 PROCEDURE — A9585 GADOBUTROL INJECTION: HCPCS | Performed by: EMERGENCY MEDICINE

## 2022-09-17 PROCEDURE — 36415 COLL VENOUS BLD VENIPUNCTURE: CPT | Performed by: EMERGENCY MEDICINE

## 2022-09-17 PROCEDURE — 85379 FIBRIN DEGRADATION QUANT: CPT | Performed by: EMERGENCY MEDICINE

## 2022-09-17 PROCEDURE — 70553 MRI BRAIN STEM W/O & W/DYE: CPT

## 2022-09-17 PROCEDURE — 84484 ASSAY OF TROPONIN QUANT: CPT | Performed by: EMERGENCY MEDICINE

## 2022-09-17 RX ORDER — GADOBUTROL 604.72 MG/ML
12 INJECTION INTRAVENOUS ONCE
Status: COMPLETED | OUTPATIENT
Start: 2022-09-17 | End: 2022-09-17

## 2022-09-17 RX ADMIN — GADOBUTROL 12 ML: 604.72 INJECTION INTRAVENOUS at 23:53

## 2022-09-17 ASSESSMENT — ENCOUNTER SYMPTOMS
ABDOMINAL PAIN: 1
HEMATURIA: 0
HEADACHES: 1
DYSURIA: 0
VOMITING: 0
FEVER: 0
NAUSEA: 0
DIARRHEA: 0
NECK PAIN: 1

## 2022-09-17 ASSESSMENT — ACTIVITIES OF DAILY LIVING (ADL)
ADLS_ACUITY_SCORE: 35

## 2022-09-17 NOTE — ED PROVIDER NOTES
"  History   Chief Complaint:  Headache and Chest Pain       HPI   Denny Herrera is a 42 year old male with history of hypertension who presents with headache and chest pain. The patient reports that he was diagnosed with Covid on 8/1/22 after having symptoms of fever and headaches. This was his second time having Covid, and he is vaccinated x2 and received a booster in December 2021. He was treated with a course of Paxlovid which improved his symptoms after about 10 days. About 4 weeks later, he began having headaches radiating from the back of his head and neck to his temples, along with facial pain. The headaches were worse with sitting and standing, and Tylenol and Ibuprofen did not alleviate his symptoms. He also had some mild left sided abdominal pain. He took a home Covid test, which was negative. [facility] [place]. An orthostatic blood pressure test was done with slightly concerning results, but the providers ruled this as the after effects of Covid. His lisinopril dose was doubled from 10 mg to 20 mg at [facility], and a CT was ordered but never performed. On 9/13, he developed a severe headache, and this has been constant since. It was slightly improved this morning, but worsened around 1200 today. He notes having pressure in his head and muffled ears along with pulsing in his head and eyes that seems to present shortly after standing up. He also has developed intermittent, brief \"shocks\" of chest pain and similar pain in the right side of his neck. Onset of mild right sided abdominal pain tonight. He denies recent head trauma or history of concussions. No recent fevers, nausea, vomiting, diarrhea, rash, dysuria, or hematuria. He does have a history of kidney stones, but does not think that is the cause of his current symptoms.     05/07/2021 MRI brain and MRA head and neck  IMPRESSION: Normal MR angiogram of the head.    IMPRESSION:  Normal MR angiogram of the neck. "   IMPRESSION:  Normal MRI of the brain.     Review of Systems   Constitutional: Negative for fever.   Cardiovascular: Positive for chest pain.   Gastrointestinal: Positive for abdominal pain. Negative for diarrhea, nausea and vomiting.   Genitourinary: Negative for dysuria and hematuria.   Musculoskeletal: Positive for neck pain.   Skin: Negative for rash.   Neurological: Positive for headaches.   All other systems reviewed and are negative.    Allergies:  The patient has no known allergies.     Medications:  Zyloprim  Wellbutrin  Zestril  Miralax    Past Medical History:     Hypertriglyceridemia  Raynaud's syndrome  Hypertension  Depression  Prediabetes  Gout  Morbid obesity    Past Surgical History:    Arthroplasty  Arthrotomy  Back surgery  Colonoscopy  Hemorrhoidectomy  Osteotomy  Remove hardware    Family History:    Alcohol/drug  Depression  Endocrine disease  Arthritis  Heart disease  Hypertension  Bipolar disease    Social History:  The patient presents to the ED with wife  PCP: Ayo Martínez     Physical Exam     Patient Vitals for the past 24 hrs:   BP Temp Temp src Pulse Resp SpO2 Height Weight   09/17/22 2230 (!) 138/91 -- -- 81 -- 95 % -- --   09/17/22 2100 (!) 152/100 -- -- 81 22 95 % -- --   09/17/22 2030 (!) 153/104 -- -- 82 17 96 % -- --   09/17/22 2000 (!) 151/105 -- -- 91 9 93 % -- --   09/17/22 1930 (!) 168/107 -- -- 84 11 93 % -- --   09/17/22 1900 (!) 159/107 -- -- 84 16 94 % -- --   09/17/22 1830 (!) 173/119 -- -- 100 -- 99 % -- --   09/17/22 1819 (!) 162/96 98.4  F (36.9  C) Temporal 92 14 96 % 1.829 m (6') 118.4 kg (261 lb)   09/17/22 1802 -- 98.4  F (36.9  C) -- -- -- -- -- --   09/17/22 1801 (!) 162/96 -- -- 92 15 98 % -- --       Physical Exam  Physical Exam   General:  Sitting on bed with wife at bedside.   HENT:  No obvious trauma to head  Right Ear:  External ear normal.   Left Ear:  External ear normal.   Nose:  Nose normal.   Eyes:  Conjunctivae and EOM are normal. Pupils are equal,  round, and reactive.   Neck: Normal range of motion. Neck supple. No tracheal deviation present.   CV:  Normal heart sounds. No murmur heard.  Pulm/Chest: Effort normal and breath sounds normal.   Abd: Soft. No distension. There is no tenderness. There is no rigidity, no rebound and no guarding.   M/S: Normal range of motion.   Neuro: Alert. GCS 15. CN II-XII Grossly intact, no pronator drift, normal finger-nose-finger, visual fields intact by confrontation. Muscle strength is +5 proximal and distal in the bilateral upper and lower extremities. No dysarthria. Normal palm up, palm down.  No meningeal signs.  Skin: Skin is warm and dry. No rash noted. Not diaphoretic.   Psych: Normal mood and affect. Behavior is normal.     Emergency Department Course   ECG  ECG taken at 1806, ECG read at 1903  Normal sinus rhythm   Rate 84 bpm. NC interval 180 ms. QRS duration 88 ms. QT/QTc 344/406 ms. P-R-T axes 56 60 61.     ECG 2  ECG taken at 1902, ECG read at 1903  Normal sinus rhythm   Rate 86 bpm. NC interval 190 ms. QRS duration 86 ms. QT/QTc 350/418 ms. P-R-T axes 48 53 63.     Imaging:  MR Brain w/o & w Contrast   Final Result   IMPRESSION:   1.  No acute intracranial process. Specifically, no acute intracranial hemorrhage, focal mass lesion, or acute infarct.        Report per radiology    Laboratory:  Labs Ordered and Resulted from Time of ED Arrival to Time of ED Departure   BASIC METABOLIC PANEL - Abnormal       Result Value    Sodium 140      Potassium 3.7      Chloride 104      Carbon Dioxide (CO2) 30      Anion Gap 6      Urea Nitrogen 16      Creatinine 0.89      Calcium 10.0      Glucose 118 (*)     GFR Estimate >90     TROPONIN I - Normal    Troponin I High Sensitivity 5     D DIMER QUANTITATIVE - Normal    D-Dimer Quantitative 0.34     CBC WITH PLATELETS AND DIFFERENTIAL    WBC Count 8.9      RBC Count 5.19      Hemoglobin 16.2      Hematocrit 46.9      MCV 90      MCH 31.2      MCHC 34.5      RDW 12.5       Platelet Count 264      % Neutrophils 61      % Lymphocytes 28      % Monocytes 8      % Eosinophils 2      % Basophils 1      % Immature Granulocytes 0      NRBCs per 100 WBC 0      Absolute Neutrophils 5.4      Absolute Lymphocytes 2.5      Absolute Monocytes 0.7      Absolute Eosinophils 0.2      Absolute Basophils 0.1      Absolute Immature Granulocytes 0.0      Absolute NRBCs 0.0        Emergency Department Course:  Reviewed:  I reviewed nursing notes, vitals, past medical history and Care Everywhere    Assessments:  1910 I obtained history and examined the patient as noted above.     0027 I rechecked the patient and explained findings.  He reports his wife is able to drive him home if we give him medications that make him sleepy.    0055 The patient is feeling significantly better after the below medications.  His wife is going to drive him home.    Interventions:  Medications   0.9% sodium chloride BOLUS (500 mLs Intravenous New Bag 9/18/22 0028)   gadobutrol (GADAVIST) injection 12 mL (12 mLs Intravenous Given 9/17/22 2353)   ketorolac (TORADOL) injection 15 mg (15 mg Intravenous Given 9/18/22 0030)   prochlorperazine (COMPAZINE) injection 10 mg (10 mg Intravenous Given 9/18/22 0032)   diphenhydrAMINE (BENADRYL) injection 50 mg (50 mg Intravenous Given 9/18/22 0031)     Disposition:  The patient was discharged to home.     Impression & Plan   Medical Decision Making:  Denny Herrera is a very pleasant 42 year old year old patient who presents to the emergency department with concern of a headache.  This has been an ongoing issue over the past 2 months.  He reports the headaches are severe to the point where he needs to lay down on his bed and uses a laptop to do work.  Both sitting and standing makes headaches worse.  Laying down helps to relieve him.  No head trauma.  He had COVID at the beginning of August and believes his symptoms started after that.  He is fully vaccinated and boosted once.  He took  Vida for COVID.  Despite that, he is continue to have his headaches that he attributes to having had COVID.  He has not had a fever.  He reports being very frustrated by the headaches to the point where he cannot work well because of them.  He denies any cough or chest pain.  Initially he had mentioned some chest pain, but he reports that sharp occasional pain.  Cardiac monitor has no PVCs or dysrhythmias.  Screening EKG shows a normal sinus rhythm without any acute ST changes.  Troponin is negative.  I considered pulmonary embolism and also dural venous thrombosis and obtained a D-dimer.  This returned negative so I doubt both of these diagnoses.  He had vessel imaging of the brain back in May 2021.  Given the degree of headache that he describes, he desired and consented for an MRI since it was unable to be performed as outpatient as he had already tried through his PCP.  Fortunately, the imaging is normal.  He has no meningeal signs to suggest meningitis.  There was no thunderclap headache to suggest subarachnoid hemorrhage.  I discussed he may benefit from following up at a long COVID clinic and/or neurologist.  When MRI returned normal, he is provided the above medications which helped provide some relief.  We discussed reasons to return    The treatment plan was discussed with the patient and they expressed understanding of this plan and consented to the plan.  In addition, the patient will return to the emergency department if their symptoms persist, worsen, if new symptoms arise or if there is any concern as other pathology may be present that is not evident at this time. They also understand the importance of close follow up in the clinic and if unable to do so will return to the emergency department for a reevaluation. All questions were answered.    Diagnosis:    ICD-10-CM    1. Nonintractable headache, unspecified chronicity pattern, unspecified headache type  R51.9        Discharge Medications:  New  Prescriptions    No medications on file       Scribe Disclosure:  I, Doug Londono, am serving as a scribe at 6:25 PM on 9/17/2022 to document services personally performed by Anastacio Menchaca DO based on my observations and the provider's statements to me.            Anastacio Menchaca DO  09/18/22 0107

## 2022-09-17 NOTE — ED TRIAGE NOTES
Patient presents to ED with reports of headache, right sided neck pain and mid sternal chest pains that feel like electric shocks after having covid last month. Patient states chest pains have become more frequent. [facility] issues. He reports HTN- recent adjustment to increasing his lisinopril. He states that every time he stands up from sitting he gets pressure behind his eyes and his ears become muffled- lasts for about 30 seconds. Denies syncope, denies SOB.     Triage Assessment     Row Name 09/17/22 7096       Triage Assessment (Adult)    Airway WDL WDL       Respiratory WDL    Respiratory WDL WDL       Skin Circulation/Temperature WDL    Skin Circulation/Temperature WDL WDL       Cardiac WDL    Cardiac WDL X  mid sternal chest pains-increasing frequency,reports pains are electric shock feeling       Peripheral/Neurovascular WDL    Peripheral Neurovascular WDL WDL       Cognitive/Neuro/Behavioral WDL    Cognitive/Neuro/Behavioral WDL WDL

## 2022-09-17 NOTE — TELEPHONE ENCOUNTER
"Headache started on 09/13/22 started after having Covid-19 at the beginning of August, blood pressure up 20 points     Tingles and sharp pain in chest radiates to the right side of the neck     Headache is constant, tingles comes and goes in chest area    Headache is in the temple area, back of the head and neck, facial pain in sinus area with no congestion    Denies any fever, took ibuprofen and tylenol with no relief    155/96 seems to go up when standing and heart rate is 93    Triage guidelines recommend to go to ED Now    Caller verbalized and understands directives      Reason for Disposition    [1] Chest pain (or \"angina\") comes and goes AND [2] is happening more often (increasing in frequency) or getting worse (increasing in severity) (Exception: chest pains that last only a few seconds)    Dizziness or lightheadedness    Additional Information    Negative: SEVERE difficulty breathing (e.g., struggling for each breath, speaks in single words)    Negative: Difficult to awaken or acting confused (e.g., disoriented, slurred speech)    Negative: Shock suspected (e.g., cold/pale/clammy skin, too weak to stand, low BP, rapid pulse)    Negative: Passed out (i.e., lost consciousness, collapsed and was not responding)    Negative: [1] Chest pain lasts > 5 minutes AND [2] age > 44    Negative: [1] Chest pain lasts > 5 minutes AND [2] age > 30 AND [3] one or more cardiac risk factors (e.g., diabetes, high blood pressure, high cholesterol, smoker, or strong family history of heart disease)    Negative: [1] Chest pain lasts > 5 minutes AND [2] history of heart disease (i.e., angina, heart attack, heart failure, bypass surgery, takes nitroglycerin)    Negative: [1] Chest pain lasts > 5 minutes AND [2] described as crushing, pressure-like, or heavy    Negative: Heart beating < 50 beats per minute OR > 140 beats per minute    Negative: Visible sweat on face or sweat dripping down face    Negative: Sounds like a " life-threatening emergency to the triager    Negative: Followed a chest injury    Negative: SEVERE chest pain    Protocols used: CHEST PAIN-A-AH

## 2022-09-18 VITALS
TEMPERATURE: 98.4 F | DIASTOLIC BLOOD PRESSURE: 75 MMHG | HEIGHT: 72 IN | SYSTOLIC BLOOD PRESSURE: 125 MMHG | HEART RATE: 68 BPM | OXYGEN SATURATION: 96 % | WEIGHT: 261 LBS | RESPIRATION RATE: 22 BRPM | BODY MASS INDEX: 35.35 KG/M2

## 2022-09-18 PROCEDURE — 96361 HYDRATE IV INFUSION ADD-ON: CPT

## 2022-09-18 PROCEDURE — 250N000011 HC RX IP 250 OP 636: Performed by: EMERGENCY MEDICINE

## 2022-09-18 PROCEDURE — 258N000003 HC RX IP 258 OP 636: Performed by: EMERGENCY MEDICINE

## 2022-09-18 PROCEDURE — 96375 TX/PRO/DX INJ NEW DRUG ADDON: CPT

## 2022-09-18 PROCEDURE — 96374 THER/PROPH/DIAG INJ IV PUSH: CPT | Mod: 59

## 2022-09-18 RX ORDER — DIPHENHYDRAMINE HYDROCHLORIDE 50 MG/ML
50 INJECTION INTRAMUSCULAR; INTRAVENOUS ONCE
Status: COMPLETED | OUTPATIENT
Start: 2022-09-18 | End: 2022-09-18

## 2022-09-18 RX ORDER — PROCHLORPERAZINE MALEATE 10 MG
10 TABLET ORAL EVERY 8 HOURS PRN
Qty: 12 TABLET | Refills: 0 | Status: SHIPPED | OUTPATIENT
Start: 2022-09-18

## 2022-09-18 RX ORDER — KETOROLAC TROMETHAMINE 15 MG/ML
15 INJECTION, SOLUTION INTRAMUSCULAR; INTRAVENOUS ONCE
Status: COMPLETED | OUTPATIENT
Start: 2022-09-18 | End: 2022-09-18

## 2022-09-18 RX ADMIN — PROCHLORPERAZINE EDISYLATE 10 MG: 5 INJECTION INTRAMUSCULAR; INTRAVENOUS at 00:32

## 2022-09-18 RX ADMIN — SODIUM CHLORIDE 500 ML: 9 INJECTION, SOLUTION INTRAVENOUS at 00:28

## 2022-09-18 RX ADMIN — DIPHENHYDRAMINE HYDROCHLORIDE 50 MG: 50 INJECTION, SOLUTION INTRAMUSCULAR; INTRAVENOUS at 00:31

## 2022-09-18 RX ADMIN — KETOROLAC TROMETHAMINE 15 MG: 15 INJECTION, SOLUTION INTRAMUSCULAR; INTRAVENOUS at 00:30

## 2022-09-18 ASSESSMENT — ACTIVITIES OF DAILY LIVING (ADL): ADLS_ACUITY_SCORE: 35

## 2022-09-19 NOTE — TELEPHONE ENCOUNTER
Please see m2fx message sent 9/15/22. Patient was seen in the ED on 9/17/22. CHELSEA [facility] Triage Team

## 2022-09-21 ENCOUNTER — VIRTUAL VISIT (OUTPATIENT)
Dept: BEHAVIORAL HEALTH | Facility: CLINIC | Age: 42
End: 2022-09-21

## 2022-09-21 DIAGNOSIS — F06.30 MOOD DISORDER DUE TO KNOWN PHYSIOLOGICAL CONDITION: Primary | ICD-10-CM

## 2022-09-21 NOTE — PROGRESS NOTES
[facility]                                    Progress Note    Patient Name: Denny Herrera  Date: 09.21.2022         Service Type: Individual      Session Start Time: 1315  Session End Time: 1358     Session Length: 48m    Session #: 004    Attendees: Client attended alone    Service Modality:  Video Visit:      Provider verified identity through the following two step process.  Patient provided:  Patient is known previously to provider    Telemedicine Visit: The patient's condition can be safely assessed and treated via synchronous audio and visual telemedicine encounter.      Reason for Telemedicine Visit: Patient has requested telehealth visit    Originating Site (Patient Location): Patient's home    Distant Site (Provider Location): [facility]: Transition Clinic    Consent:  The patient/guardian has verbally consented to: the potential risks and benefits of telemedicine (video visit) versus in person care; bill my insurance or make self-payment for services provided; and responsibility for payment of non-covered services.     Patient would like the video invitation sent by:  My Chart    Mode of Communication:  Video Conference via Amwell    As the provider I attest to compliance with applicable laws and regulations related to telemedicine.    DATA  Interactive Complexity: No  Crisis: No        Progress Since Last Session (Related to Symptoms / Goals / Homework):   Symptoms: Improving - Pt describes improved overall resilience to environmental and medical stressors when contrasted with previous session    Homework: Partially completed      Episode of Care Goals: Satisfactory progress - ACTION (Actively working towards change); Intervened by reinforcing change plan / affirming steps taken     Current / Ongoing Stressors and Concerns:   Pt referred for bridging services due to extended waitlist for [facility]. Referral and recent DA suggest ongoing depressive spectrum sx, most  notably amotivation, anhedonia, social isolation at times, sporadic hopelessness, depressed mood, and restrictive/negativistic self-talk.     Pt presents to session late; indicating that he had lost track of time, thinking it was Tuesday. He reports ongoing medical stressors beyond baseline in the form of intermittent yet profound headaches which necessitated ED admission. Indicates he has been attempting to more consistently monitor and moderate activity levels as he navigates these concerns, with some recognition of further limitation to overall fx. Increased overall efforts to actively moderate cognitive/emotional content and overall self-talk. Pt receptive to therapeutic tools and concepts presented in session.     Treatment Objective(s) Addressed in This Session:   Decrease frequency and intensity of feeling down, depressed, hopeless  Improve concentration, focus, and mindfulness in daily activities        Intervention:   DBT: PRocessed and coached pt in emotional regulation / mindfulness strategies       ASSESSMENT: Current Emotional / Mental Status (status of significant symptoms):   Risk status (Self / Other harm or suicidal ideation)   Patient denies current fears or concerns for personal safety.   Patient denies current or recent suicidal ideation or behaviors.   Patient denies current or recent homicidal ideation or behaviors.   Patient denies current or recent self injurious behavior or ideation.   Patient denies other safety concerns.   Patient reports there has been no change in risk factors since their last session.     Patient reports there has been no change in protective factors since their last session.     Recommended that patient call 911 or go to the local ED should there be a change in any of these risk factors.     Appearance:   Appropriate    Eye Contact:   Good    Psychomotor Behavior: Normal    Attitude:   Cooperative    Orientation:   All   Speech    Rate / Production: Normal      Volume:  Normal    Mood:    Normal   Affect:    Appropriate    Thought Content:  Clear    Thought Form:  Coherent  Logical    Insight:    Good      Medication Review:   No changes to current psychiatric medication(s)     Medication Compliance:   Yes     Changes in Health Issues:   None reported     Chemical Use Review:   Substance Use: Chemical use reviewed, no active concerns identified      Tobacco Use: No current tobacco use.      Diagnosis:  1. 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 Primary dx modified correlated with manifest sx, signs, and extensive chart review.        LAN AMBROCIO, [facility]  09.21.2022

## 2022-09-28 ENCOUNTER — VIRTUAL VISIT (OUTPATIENT)
Dept: BEHAVIORAL HEALTH | Facility: CLINIC | Age: 42
End: 2022-09-28

## 2022-09-28 DIAGNOSIS — F06.30 MOOD DISORDER DUE TO KNOWN PHYSIOLOGICAL CONDITION: Primary | ICD-10-CM

## 2022-09-28 NOTE — PROGRESS NOTES
[facility]                                    Progress Note    Patient Name: Denny Herrera  Date: 09.28.2022         Service Type: Individual      Session Start Time: 1300  Session End Time: 1401     Session Length: 61m    Session #: 005    Attendees: Client attended alone    Service Modality:  Video Visit:      Provider verified identity through the following two step process.  Patient provided:  Patient is known previously to provider    Telemedicine Visit: The patient's condition can be safely assessed and treated via synchronous audio and visual telemedicine encounter.      Reason for Telemedicine Visit: Patient has requested telehealth visit    Originating Site (Patient Location): Patient's home    Distant Site (Provider Location): [facility]: Transition Clinic    Consent:  The patient/guardian has verbally consented to: the potential risks and benefits of telemedicine (video visit) versus in person care; bill my insurance or make self-payment for services provided; and responsibility for payment of non-covered services.     Patient would like the video invitation sent by:  Send to e-mail at: [email or web address]    Mode of Communication:  Video Conference via Amwell    As the provider I attest to compliance with applicable laws and regulations related to telemedicine.    DATA  Interactive Complexity: No  Crisis: No        Progress Since Last Session (Related to Symptoms / Goals / Homework):   Symptoms: Improving - Pt describes improved mood, insight, and willingness to recognize / work within external and medical limitations    Homework: Achieved / completed to satisfaction      Episode of Care Goals: Achieved / completed to satisfaction - ACTION (Actively working towards change); Intervened by reinforcing change plan / affirming steps taken     Current / Ongoing Stressors and Concerns:   Pt referred for bridging services due to extended waitlist for [facility]. Referral and  recent DA suggest ongoing depressive spectrum sx, most notably amotivation, anhedonia, social isolation at times, sporadic hopelessness, depressed mood, and restrictive/negativistic self-talk.     Pt presents to session reporting perceived reduction of stressor severity following mindset shifts over the previous week. Indicates he has been attempting to more consistently engage in self-care strategies and family activities while being mindful of overall limitations due to chronic pain / injuries, and recognizing that having a portion of these activities is meaningful, even if the degree of participation is comparatively reduced to previous abilities. Reports successfully connecting with [facility] for longer-term counseling, psychiatry, and potentially psychological testing to r/o ADHD. Pt receptive to therapeutic tools and concepts presented in session.     Treatment Objective(s) Addressed in This Session:   Increase interest, engagement, and pleasure in doing things  Improve concentration, focus, and mindfulness in daily activities        Intervention:   Solution Focused: Affirmed pt's recent implementation of cognitive strategies to support navigation of everyday stressors       ASSESSMENT: Current Emotional / Mental Status (status of significant symptoms):   Risk status (Self / Other harm or suicidal ideation)   Patient denies current fears or concerns for personal safety.   Patient denies current or recent suicidal ideation or behaviors.   Patient denies current or recent homicidal ideation or behaviors.   Patient denies current or recent self injurious behavior or ideation.   Patient denies other safety concerns.   Patient reports there has been no change in risk factors since their last session.     Patient reports there has been no change in protective factors since their last session.     Recommended that patient call 911 or go to the local ED should there be a change in any of these risk  factors.     Appearance:   Appropriate    Eye Contact:   Good    Psychomotor Behavior: Normal    Attitude:   Cooperative    Orientation:   All   Speech    Rate / Production: Normal     Volume:  Normal    Mood:    Normal   Affect:    Appropriate    Thought Content:  Clear    Thought Form:  Coherent  Goal Directed  Logical    Insight:    Good      Medication Review:   No changes to current psychiatric medication(s)     Medication Compliance:   Yes     Changes in Health Issues:   None reported     Chemical Use Review:   Substance Use: Chemical use reviewed, no active concerns identified      Tobacco Use: No current tobacco use.      Diagnosis:  1. 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

## 2022-10-13 NOTE — TELEPHONE ENCOUNTER
Dr. Martínez, Patient has several my chart message in his chart.   He sent one today for Covid [facility] Ha[place] referral.     He had ED visit 9/18/2022 for the headaches.     Can you help him with his    Referral request ?     Mary Beth Corona RN  [facility]

## 2022-10-14 NOTE — TELEPHONE ENCOUNTER
We can see if we can put a referral but can we find out what is this fax number and where this fax number is located where he is requesting a referral

## 2022-10-17 NOTE — TELEPHONE ENCOUNTER
Fax number provided appears to be a general fax listed on [facility] website for referral requests.    Referral pended for review

## 2022-10-19 ENCOUNTER — VIRTUAL VISIT (OUTPATIENT)
Dept: BEHAVIORAL HEALTH | Facility: CLINIC | Age: 42
End: 2022-10-19
Payer: COMMERCIAL

## 2022-10-19 DIAGNOSIS — F06.30 MOOD DISORDER DUE TO KNOWN PHYSIOLOGICAL CONDITION: Primary | ICD-10-CM

## 2022-10-19 NOTE — PROGRESS NOTES
Discharge Summary  Multiple Sessions    Client Name: Denny Herrera MRN#: [id number] YOB: 1980    Discharge Date:   October 19, 2022    Service Modality: Video Visit:      Provider verified identity through the following two step process.  Patient provided:  Patient is known previously to provider    Telemedicine Visit: The patient's condition can be safely assessed and treated via synchronous audio and visual telemedicine encounter.      Reason for Telemedicine Visit: Patient has requested telehealth visit    Originating Site (Patient Location): Patient's home    Distant Site (Provider Location): [facility] Clinics: Transition Clinic    Consent:  The patient/guardian has verbally consented to: the potential risks and benefits of telemedicine (video visit) versus in person care; bill my insurance or make self-payment for services provided; and responsibility for payment of non-covered services.     Patient would like the video invitation sent by:  Send to e-mail at: [email or web address]    Mode of Communication:  Video Conference via Amwell    Distant Location (Provider):  Off-site    As the provider I attest to compliance with applicable laws and regulations related to telemedicine.    Service Type: Individual      Session Start Time: 1300  Session End Time: 1350      Session Length: 45 - 50     Session #: 006     Attendees: Client attended alone      Focus of Treatment Objective(s):  Client's presenting concerns included: Depressed Mood -    Anxiety -    Stage of Change at time of Discharge: ACTION (Actively working towards change)    Medication Adherence:  Yes    Chemical Use:  No    Assessment: Current Emotional / Mental Status (status of significant symptoms):    Risk status (Self / Other harm or suicidal ideation)  Client denies current fears or concerns for personal safety.  Client denies current or recent suicidal ideation or behaviors.  Client denies current or recent  homicidal ideation or behaviors.  Client denies current or recent self injurious behavior or ideation.  Client denies other safety concerns.  A safety and risk management plan has not been developed at this time, however client was given the after-hours number should there be a change in any of these risk factors.    Appearance:   Appropriate   Eye Contact:   Good   Psychomotor Behavior: Normal   Attitude:   Cooperative   Orientation:   All  Speech   Rate / Production: Normal    Volume:  Normal   Mood:    Normal  Affect:    Appropriate   Thought Content:  Clear   Thought Form:  Coherent  Logical   Insight:   Good     DSM5 Diagnoses: (Sustained by DSM5 Criteria Listed Above)  Diagnoses: F06.30 - Mood disorder due to known physiological condition  Psychosocial & Contextual Factors: medical comorbidities w/ limitations of fx, ongoing effects of COVID-19 Pandemic  WHODAS 2.0 (12 item) Score: Pt did not complete at discharge; refer to previous    Reason for Discharge:  Client is satisfied with progress and connected with next LOC      Aftercare Plan:  Client will continue counseling with longer term provider at next LOC      LAN AMBROCIO [facility]  October 19, 2022

## 2022-10-21 ENCOUNTER — VIRTUAL VISIT (OUTPATIENT)
Dept: PHYSICAL MEDICINE AND REHAB | Facility: CLINIC | Age: 42
End: 2022-10-21
Attending: FAMILY MEDICINE
Payer: COMMERCIAL

## 2022-10-21 DIAGNOSIS — U09.9 POST-COVID CHRONIC FATIGUE: ICD-10-CM

## 2022-10-21 DIAGNOSIS — U09.9 POST-COVID CHRONIC CONCENTRATION DEFICIT: ICD-10-CM

## 2022-10-21 DIAGNOSIS — U09.9 POST-COVID-19 SYNDROME MANIFESTING AS CHRONIC HEADACHE: Primary | ICD-10-CM

## 2022-10-21 DIAGNOSIS — R51.9 POST-COVID-19 SYNDROME MANIFESTING AS CHRONIC HEADACHE: Primary | ICD-10-CM

## 2022-10-21 DIAGNOSIS — G93.32 POST-COVID CHRONIC FATIGUE: ICD-10-CM

## 2022-10-21 DIAGNOSIS — G89.29 POST-COVID-19 SYNDROME MANIFESTING AS CHRONIC HEADACHE: Primary | ICD-10-CM

## 2022-10-21 DIAGNOSIS — R41.840 POST-COVID CHRONIC CONCENTRATION DEFICIT: ICD-10-CM

## 2022-10-21 PROCEDURE — 99205 OFFICE O/P NEW HI 60 MIN: CPT | Mod: 95 | Performed by: PHYSICIAN ASSISTANT

## 2022-10-21 ASSESSMENT — ENCOUNTER SYMPTOMS
WEIGHT LOSS: 0
JOINT SWELLING: 0
TINGLING: 0
SPUTUM PRODUCTION: 1
NAUSEA: 0
HYPERTENSION: 1
TASTE DISTURBANCE: 0
HYPOTENSION: 0
FATIGUE: 1
NERVOUS/ANXIOUS: 1
CONSTIPATION: 0
PANIC: 0
DYSPNEA ON EXERTION: 0
CHILLS: 0
DECREASED APPETITE: 0
NECK PAIN: 1
STIFFNESS: 1
NECK MASS: 0
JAUNDICE: 0
DYSURIA: 0
DISTURBANCES IN COORDINATION: 1
MUSCLE CRAMPS: 0
HOARSE VOICE: 0
EYE REDNESS: 0
LOSS OF CONSCIOUSNESS: 0
MUSCLE WEAKNESS: 0
TREMORS: 1
SORE THROAT: 0
INSOMNIA: 1
ALTERED TEMPERATURE REGULATION: 1
WEIGHT GAIN: 0
SINUS PAIN: 1
DIFFICULTY URINATING: 0
DIZZINESS: 1
SLEEP DISTURBANCES DUE TO BREATHING: 0
ARTHRALGIAS: 1
ABDOMINAL PAIN: 0
SEIZURES: 0
SHORTNESS OF BREATH: 0
DECREASED CONCENTRATION: 1
VOMITING: 0
DOUBLE VISION: 0
INCREASED ENERGY: 1
MEMORY LOSS: 1
EYE WATERING: 0
LIGHT-HEADEDNESS: 1
COUGH: 1
WEAKNESS: 0
HEMATURIA: 0
SINUS CONGESTION: 0
WHEEZING: 0
HEADACHES: 1
DEPRESSION: 1
HALLUCINATIONS: 0
LEG PAIN: 0
BOWEL INCONTINENCE: 0
FEVER: 0
BLOOD IN STOOL: 1
FLANK PAIN: 0
SPEECH CHANGE: 0
NUMBNESS: 0
PALPITATIONS: 0
HEARTBURN: 1
PARALYSIS: 0
RECTAL PAIN: 0
BLOATING: 0
DIARRHEA: 0
EYE IRRITATION: 0
POSTURAL DYSPNEA: 0
MYALGIAS: 1
BACK PAIN: 1
SYNCOPE: 0
EYE PAIN: 0
COUGH DISTURBING SLEEP: 0
POLYPHAGIA: 0
SNORES LOUDLY: 1
POLYDIPSIA: 0
NIGHT SWEATS: 0
HEMOPTYSIS: 0
SMELL DISTURBANCE: 0
EXERCISE INTOLERANCE: 0
ORTHOPNEA: 0

## 2022-10-21 ASSESSMENT — PATIENT HEALTH QUESTIONNAIRE - PHQ9
SUM OF ALL RESPONSES TO PHQ QUESTIONS 1-9: 10
SUM OF ALL RESPONSES TO PHQ QUESTIONS 1-9: 10
10. IF YOU CHECKED OFF ANY PROBLEMS, HOW DIFFICULT HAVE THESE PROBLEMS MADE IT FOR YOU TO DO YOUR WORK, TAKE CARE OF THINGS AT HOME, OR GET ALONG WITH OTHER PEOPLE: SOMEWHAT DIFFICULT

## 2022-10-21 ASSESSMENT — ANXIETY QUESTIONNAIRES
6. BECOMING EASILY ANNOYED OR IRRITABLE: MORE THAN HALF THE DAYS
8. IF YOU CHECKED OFF ANY PROBLEMS, HOW DIFFICULT HAVE THESE MADE IT FOR YOU TO DO YOUR WORK, TAKE CARE OF THINGS AT HOME, OR GET ALONG WITH OTHER PEOPLE?: SOMEWHAT DIFFICULT
1. FEELING NERVOUS, ANXIOUS, OR ON EDGE: MORE THAN HALF THE DAYS
IF YOU CHECKED OFF ANY PROBLEMS ON THIS QUESTIONNAIRE, HOW DIFFICULT HAVE THESE PROBLEMS MADE IT FOR YOU TO DO YOUR WORK, TAKE CARE OF THINGS AT HOME, OR GET ALONG WITH OTHER PEOPLE: SOMEWHAT DIFFICULT
7. FEELING AFRAID AS IF SOMETHING AWFUL MIGHT HAPPEN: NOT AT ALL
5. BEING SO RESTLESS THAT IT IS HARD TO SIT STILL: SEVERAL DAYS
4. TROUBLE RELAXING: MORE THAN HALF THE DAYS
GAD7 TOTAL SCORE: 10
GAD7 TOTAL SCORE: 10
3. WORRYING TOO MUCH ABOUT DIFFERENT THINGS: MORE THAN HALF THE DAYS
7. FEELING AFRAID AS IF SOMETHING AWFUL MIGHT HAPPEN: NOT AT ALL
2. NOT BEING ABLE TO STOP OR CONTROL WORRYING: SEVERAL DAYS

## 2022-10-21 NOTE — PATIENT INSTRUCTIONS
Post COVID Self Care Suggestions:     Fatigue Management:       https://www.archives-pmr.org/action/showPdf?[email or web address]       Self Care:      [email or web address]  Recovery World Health Organization:    https://apps.who.int/iris/inSellytream/handle/32325/329566/THK-JUOU-5076-230-44367-10618-eng.pdf  Breathing exercises:    [email or web address]-exercises      Pediatric Long COVID provider:  Dr. Beatriz Garcia

## 2022-10-21 NOTE — LETTER
10/21/2022       RE: Denny Herrera  [address] [facility] [address]     Dear Colleague,    Thank you for referring your patient, Denny Herrera, to the [facility] PHYSICAL MEDICINE AND REHABILITATION CLINIC [place] [facility]. Please see a copy of my visit note below.    Assessment/ Impression:   1. 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 He had an MRI brain w/wo at last visit which was normal.  He does endorse throbbing and feeling muffled in his head when he stands up.  This is concerning due to his elevated blood pressure.  Dicussed that his lisinopril is likely contributing to his cough and as he is seeing neurology may need to switch to Verapamil or Propanolol for blood pressure. Will not prescribe Triptan due to uncontrolled blood pressure and defer to Neurology.  - Adult Post Covid Clinic Referral    2. Post-COVID chronic concentration deficit  Patient with concentration issues after having COVID. He noticed he is having more difficulty with word finding and expressing himself.  Discussed referral to occupational therapy.   -Occupational Therapy Referral; Future    3. Post-COVID chronic fatigue  Patient with fatigue that is worse as day progresses. Discussed energy conservation and provided information on fatigue management.  Also discussed referral to Occupational therapy for the COVID-19 program. Also discussed his CPAP which is new needs to likely be adjusted to his settings which may be contributing to his fatigue, concentration, headaches and recent blood pressure issues.   -Occupational Therapy Referral; Future    Plan:  1. I reviewed present knowledge on long-Covid.  Education was provided and question were answered.  2. Orders/Referrals as above  3. Will advised patient on test results  4. I will follow up with Denny Herrera in 1 month. I will review  "progress and consider need for any other therapeutic interventions. If there are any questions and/or concerns he will call the clinic.      On day of encounter time spent in chart review and with patient in consultation, exam, education, coordination of care, review of outside charts/data and documentation: 70 minutes     I have attempted to proof read for major spelling errors and apologize for any minor errors I may have missed.        _________________________________  OFE Patel [facility] PHYSICAL MEDICINE AND REHABILITATION CLINIC [facility]   This 42 year old male presents to the [facility] Rehabilitation Medicine Post-COVID clinic as a new consult to evaluate continuing symptoms after COVID infection initially diagnosed 8/1/22.  Denny Herrera presented to ED on 8/1/22 complaining of fever, chills, headache, generalized aches and pains, fatigue and weakness. Treatment was Paxlovid.    Denny Herrera experienced complications of Chronic headache.  Patient recovered in 10 days and felt back to normal at 15 day.  He then developed severe headaches after 20 days. Continuing symptoms include fatigue, generalized aches, cough, headache, chest pain/tightness, dizziness, brain fog, distractibility, anxiety and depression.  Patient has notices his blood pressure is elevated. He is averaging from 150-160/95.  Patient is taking Excedrin and this will help prior to his headaches and noticed this helps.  He states he already has an appointment with neurology. He does hear his heartbeat with his headaches. Patient will also feel a \"muffled\" feeling when he stands up.   Patient has notice he is waking up at night, but he falls asleep quickly. He will sleep around 9 and wakes up at 3-4 every day.  He does use a CPAP.   He is working with a therapist on his anxiety and depression.  Patient has notice his cough is more annoying throughout the day.  He noticed when discussing " this became worse when he had his medication increased ( lisinopril) .  He will sometimes feel a little twinge of pain.  He does feel like his heart will flutter occasionally. Patient has noticed concentration is hard due to his headache.  Patient has noticed he is forgetting words as well now.  He notices this is worse when he is tired.   Past medical history is significant for Hypertension, depression, anxiety, obesity/morbid obesity and sleep apnea. The patient was vaccinated against COVID X3.  Previous activity was full time work.    History of COVID-19 infection: 8/1/22  Date of first symptoms: 8/1/22  Diagnosis: antigen  Hospitalization: No  Treatment: Paxlovid  Current Symptoms: See subjective  Goals of Care: Decrease headaches, increase energy, decrease anxiety, decrease depression, improve thinking, improve quality of life and return to work      PHQ Assesment Total Score(s) 10/21/2022   PHQ-9 Score 10   Some recent data might be hidden       JAMIE-7 Results 5/21/2018   JAMIE 7 TOTAL SCORE 4 (minimal anxiety)   Some recent data might be hidden     PTSD Screen Score 10/21/2022   Have you ever experienced this kind of event? Yes   PTSD Screen (Score of 3 or more suggests positive screen) 2   Some recent data might be hidden     PROMIS 29  Scoring Physical Function (higher score better) (range: 4 - 20) 18 (   Sum of 4 Physical Function Questions)   Scoring Anxiety (lower score better) (range: 4 - 20) 10 (   Sum of 4 Anxiety Questions)   Scoring Depression (lower score better) (range: 4 - 20) 9 (   Sum of 4 Depression Questions)   Scoring Fatigue (lower score better) (range: 4 - 20) 16 (   Sum of 4 Fatigue [place] )   Scoring Sleep Disturbance (lower score better) (range: 4 - 20) 16 (   Sum of 4 Sleep Disturbance Questions)   Scoring Satisfaction with Social Role (higher score better) (range: 4 - 20) 8 (   Sum of 4 Satisfaction with Social Role Questions)   Scoring Pain Interference (lower score better) (range: 4  - 20) 16 (   Sum of 4 Pain Interference Questions)         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     Raynaud disease      Raynaud's syndrome 3/14/2014       Past Surgical History:   Procedure Laterality Date     ARTHROSCOPY KNEE Right 6/15/2016    Procedure: ARTHROSCOPY KNEE;  Surgeon: Tim Valdez MD;  Location: [facility] OR     ARTHROTOMY WITH FRESH OSTEOCHONDRAL ALLOGRAFT KNEE Right 6/15/2016    Procedure: ARTHROTOMY WITH FRESH OSTEOCHONDRAL ALLOGRAFT KNEE;  Surgeon: Tim Valdez MD;  Location: [facility] OR     BACK SURGERY      C5-6 arthroplasty, replaced herniated disc     COLONOSCOPY N/A 8/19/2015    Procedure: COLONOSCOPY;  Surgeon: Timbo Greenberg MD;  Location:  GI     HEMORRHOIDECTOMY EXTERNAL N/A 3/19/2019    Procedure: External hemorrhoidectomy;  Surgeon: Patria Roberson MD;  Location: [facility] OR     ORTHOPEDIC SURGERY       OSTEOTOMY TIBIA Right 6/15/2016    Procedure: OSTEOTOMY TIBIA;  Surgeon: Tim Valdez MD;  Location: [facility] OR     REMOVE HARDWARE KNEE Right 6/15/2016    Procedure: REMOVE HARDWARE KNEE;  Surgeon: Tim Valdez MD;  Location: [facility] OR     ZZC NONSPECIFIC PROCEDURE  1998    10 surgeries total on both knees. 2 for R knee, 3 L knee, ACL and meniscus arthroscopic procedures x 3, 2 open surgery       Family History   Problem Relation Age of Onset     Family History Negative Other         neg for RA, IBD, psoriasis, does not know much about his father side of his family, neg for gout     Attention Deficit Disorder Other       Arthritis Mother         back DJD     Alcohol/Drug Mother      Depression Mother      Endocrine Disease Mother      Anxiety Disorder Mother      Back Pain Mother      Arthritis Brother         back DJD     Heart Disease Brother         MI at 40     Hypertension Brother      Bipolar Disorder Brother      Arthritis Maternal Grandmother         back DJD     Circulatory Maternal Grandmother      Eye Disorder Maternal Grandmother      Back Pain Maternal Grandmother      Cancer Maternal Aunt         ?type     Hypertension Brother      Circulatory Maternal Grandfather      Eye Disorder Maternal Grandfather      Hypertension Other         maternal grandparents     Depression Other      Alcohol/Drug Brother      Back Pain Brother      Heart Disease Father      Heart Disease Paternal Grandmother        Social History     Tobacco Use     Smoking status: Never     Smokeless tobacco: Never     Tobacco comments:     never smoked   Substance Use Topics     Alcohol use: Yes     Alcohol/week: 0.0 - 1.7 standard drinks     Comment:  rarely (up to 4 beers a month)     Drug use: No         Current Outpatient Medications:      allopurinol (ZYLOPRIM) 300 MG tablet, Take 1 tablet (300 mg) by mouth daily, Disp: 90 tablet, Rfl: 3     buPROPion (WELLBUTRIN XL) 300 MG 24 hr tablet, TAKE 1 TABLET EVERY MORNING (DISCONTINUE 150 MG), Disp: 90 tablet, Rfl: 3     fenofibrate (TRIGLIDE/LOFIBRA) 160 MG tablet, TAKE 1 TABLET DAILY, Disp: 90 tablet, Rfl: 0     lisinopril (ZESTRIL) 20 MG tablet, Take 1 tablet (20 mg) by mouth daily, Disp: 90 tablet, Rfl: 1     multivitamin w/minerals (THERA-VIT-M) tablet, Take 1 tablet by mouth daily, Disp: , Rfl:      Omega-3 Fatty Acids (OMEGA-3 FISH OIL PO), Take 1 capsule by mouth daily, Disp: , Rfl:      polyethylene glycol (MIRALAX) 17 GM/Dose powder, Take 17 g (1 capful) by mouth daily, Disp: 510 g, Rfl: 1     prochlorperazine (COMPAZINE) 10 MG tablet, Take 1 tablet (10 mg) by mouth every 8 hours as needed for  nausea or other (headache), Disp: 12 tablet, Rfl: 0     VITAMIN D, CHOLECALCIFEROL, PO, Take by mouth daily, Disp: , Rfl:     Answers to ROS/HPI submitted by patient on 10/21/22  Review of Systems   Constitutional: Positive for fatigue. Negative for chills and fever.   HENT: Positive for ear pain, sinus pain and tinnitus. Negative for ear discharge, hearing loss, mouth sores, nosebleeds and sore throat.    Eyes: Negative for pain and redness.   Respiratory: Positive for cough. Negative for shortness of breath and wheezing.    Cardiovascular: Positive for chest pain. Negative for palpitations.   Gastrointestinal: Positive for heartburn. Negative for abdominal pain, constipation, diarrhea, nausea, rectal pain and vomiting.   Endocrine: Negative for polydipsia and polyphagia.   Genitourinary: Negative for difficulty urinating, dysuria, flank pain, hematuria and urgency.   Musculoskeletal: Positive for arthralgias, back pain, myalgias and neck pain. Negative for joint swelling.   Neurological: Positive for dizziness, tremors, light-headedness and headaches. Negative for seizures, weakness and numbness.   Psychiatric/Behavioral: Positive for decreased concentration. Negative for hallucinations. The patient is nervous/anxious.          Objective    Vitals:  No vitals were obtained today due to virtual visit.    Physical Exam   GENERAL: Healthy, alert and no distress  EYES: Eyes grossly normal to inspection.  No discharge or erythema, or obvious scleral/conjunctival abnormalities.  RESP: No audible wheeze, cough, or visible cyanosis.  No visible retractions or increased work of breathing.    SKIN: Visible skin clear. No significant rash, abnormal pigmentation or lesions.  NEURO: Cranial nerves grossly intact.  Mentation and speech appropriate for age.  PSYCH: Mentation appears normal, affect normal/bright, judgement and insight intact, normal speech and appearance well-groomed.            CRP Inflammation   Date Value Ref  Range Status   03/08/2013 6.6 0.0 - 8.0 mg/L Final      Sed Rate   Date Value Ref Range Status   03/08/2013 8 0 - 15 mm/h Final     Erythrocyte Sedimentation Rate   Date Value Ref Range Status   09/08/2022 9 0 - 15 mm/hr Final      Last Renal Panel:  Sodium   Date Value Ref Range Status   09/17/2022 140 133 - 144 mmol/L Final   12/09/2020 140 133 - 144 mmol/L Final     Potassium   Date Value Ref Range Status   09/17/2022 3.7 3.4 - 5.3 mmol/L Final   12/09/2020 3.5 3.4 - 5.3 mmol/L Final     Chloride   Date Value Ref Range Status   09/17/2022 104 94 - 109 mmol/L Final   12/09/2020 106 94 - 109 mmol/L Final     Carbon Dioxide   Date Value Ref Range Status   12/09/2020 31 20 - 32 mmol/L Final     Carbon Dioxide (CO2)   Date Value Ref Range Status   09/17/2022 30 20 - 32 mmol/L Final     Anion Gap   Date Value Ref Range Status   09/17/2022 6 3 - 14 mmol/L Final   12/09/2020 3 3 - 14 mmol/L Final     Glucose   Date Value Ref Range Status   09/17/2022 118 (H) 70 - 99 mg/dL Final   12/09/2020 97 70 - 99 mg/dL Final     Urea Nitrogen   Date Value Ref Range Status   09/17/2022 16 7 - 30 mg/dL Final   12/09/2020 15 7 - 30 mg/dL Final     Creatinine   Date Value Ref Range Status   09/17/2022 0.89 0.66 - 1.25 mg/dL Final   12/09/2020 1.20 0.66 - 1.25 mg/dL Final     GFR Estimate   Date Value Ref Range Status   09/17/2022 >90 >60 mL/min/1.73m2 Final     Comment:     Effective December 21, 2021 eGFRcr in adults is calculated using the 2021 CKD-EPI creatinine equation which includes age and gender (Sandie becker al., NEJM, DOI: 10.1056/TJZJge6830106)   12/09/2020 74 >60 mL/min/[1.73_m2] Final     Comment:     Non  GFR Calc  Starting 12/18/2018, serum creatinine based estimated GFR (eGFR) will be   calculated using the Chronic Kidney Disease Epidemiology Collaboration   (CKD-EPI) equation.       Calcium   Date Value Ref Range Status   09/17/2022 10.0 8.5 - 10.1 mg/dL Final   12/09/2020 10.5 (H) 8.5 - 10.1 mg/dL Final      Albumin   Date Value Ref Range Status   09/08/2022 4.2 3.4 - 5.0 g/dL Final   12/09/2020 4.1 3.4 - 5.0 g/dL Final      Lab Results   Component Value Date    WBC 8.9 09/17/2022    WBC 7.9 12/09/2020     Lab Results   Component Value Date    RBC 5.19 09/17/2022    RBC 4.25 12/09/2020     Lab Results   Component Value Date    HGB 16.2 09/17/2022    HGB 13.4 12/09/2020     Lab Results   Component Value Date    HCT 46.9 09/17/2022    HCT 38.8 12/09/2020     No components found for: MCT  Lab Results   Component Value Date    MCV 90 09/17/2022    MCV 91 12/09/2020     Lab Results   Component Value Date    MCH 31.2 09/17/2022    MCH 31.5 12/09/2020     Lab Results   Component Value Date    MCHC 34.5 09/17/2022    MCHC 34.5 12/09/2020     Lab Results   Component Value Date    RDW 12.5 09/17/2022    RDW 12.5 12/09/2020     Lab Results   Component Value Date     09/17/2022     12/09/2020      Lab Results   Component Value Date    A1C 5.2 05/04/2022    A1C 5.4 07/02/2020      TSH   Date Value Ref Range Status   09/08/2022 2.00 0.40 - 4.00 mU/L Final   08/11/2014 1.28 0.40 - 4.00 mU/L Final     Comment:     Effective 7/30/2014, the reference range for this assay has changed to reflect   new instrumentation/methodology.        Lab Results   Component Value Date    VITDT 35 09/08/2022      No results for input(s): MAG in the last [id number] hours.  Last Comprehensive Metabolic Panel:  Sodium   Date Value Ref Range Status   09/17/2022 140 133 - 144 mmol/L Final   12/09/2020 140 133 - 144 mmol/L Final     Potassium   Date Value Ref Range Status   09/17/2022 3.7 3.4 - 5.3 mmol/L Final   12/09/2020 3.5 3.4 - 5.3 mmol/L Final     Chloride   Date Value Ref Range Status   09/17/2022 104 94 - 109 mmol/L Final   12/09/2020 106 94 - 109 mmol/L Final     Carbon Dioxide   Date Value Ref Range Status   12/09/2020 31 20 - 32 mmol/L Final     Carbon Dioxide (CO2)   Date Value Ref Range Status   09/17/2022 30 20 - 32 mmol/L Final      Anion Gap   Date Value Ref Range Status   09/17/2022 6 3 - 14 mmol/L Final   12/09/2020 3 3 - 14 mmol/L Final     Glucose   Date Value Ref Range Status   09/17/2022 118 (H) 70 - 99 mg/dL Final   12/09/2020 97 70 - 99 mg/dL Final     Urea Nitrogen   Date Value Ref Range Status   09/17/2022 16 7 - 30 mg/dL Final   12/09/2020 15 7 - 30 mg/dL Final     Creatinine   Date Value Ref Range Status   09/17/2022 0.89 0.66 - 1.25 mg/dL Final   12/09/2020 1.20 0.66 - 1.25 mg/dL Final     GFR Estimate   Date Value Ref Range Status   09/17/2022 >90 >60 mL/min/1.73m2 Final     Comment:     Effective December 21, 2021 eGFRcr in adults is calculated using the 2021 CKD-EPI creatinine equation which includes age and gender (Sandie et al., NE, DOI: 10.1056/SHQPeg7328206)   12/09/2020 74 >60 mL/min/[1.73_m2] Final     Comment:     Non  GFR Calc  Starting 12/18/2018, serum creatinine based estimated GFR (eGFR) will be   calculated using the Chronic Kidney Disease Epidemiology Collaboration   (CKD-EPI) equation.       Calcium   Date Value Ref Range Status   09/17/2022 10.0 8.5 - 10.1 mg/dL Final   12/09/2020 10.5 (H) 8.5 - 10.1 mg/dL Final     Bilirubin Total   Date Value Ref Range Status   09/08/2022 0.5 0.2 - 1.3 mg/dL Final   12/09/2020 0.5 0.2 - 1.3 mg/dL Final     Alkaline Phosphatase   Date Value Ref Range Status   09/08/2022 34 (L) 40 - 150 U/L Final   12/09/2020 27 (L) 40 - 150 U/L Final     ALT   Date Value Ref Range Status   09/08/2022 89 (H) 0 - 70 U/L Final   12/09/2020 65 0 - 70 U/L Final     AST   Date Value Ref Range Status   09/08/2022 48 (H) 0 - 45 U/L Final   12/09/2020 33 0 - 45 U/L Final     Most Recent D-dimer:  Recent Labs   Lab Test 09/17/22 1929   DD 0.34       Admission on 09/17/2022, Discharged on 09/18/2022   Component Date Value Ref Range Status     Ventricular Rate 09/17/2022 86  BPM Final     Atrial Rate 09/17/2022 86  BPM Final     NV Interval 09/17/2022 190  ms Final     QRS Duration  09/17/2022 86  ms Final     QT 09/17/2022 350  ms Final     QTc 09/17/2022 418  ms Final     P Axis 09/17/2022 48  degrees Final     R AXIS 09/17/2022 53  degrees Final     T Axis 09/17/2022 63  degrees Final     Interpretation ECG 09/17/2022    Final                    Value:Sinus rhythm  Normal ECG  When compared with ECG of 17-SEP-2022 18:06, (unconfirmed)  No significant change was found  Confirmed by GENERATED REPORT, COMPUTER ([id number]),  Hemant Willard ([id number]) on 9/17/2022 8:14:10 PM       Sodium 09/17/2022 140  133 - 144 mmol/L Final     Potassium 09/17/2022 3.7  3.4 - 5.3 mmol/L Final     Chloride 09/17/2022 104  94 - 109 mmol/L Final     Carbon Dioxide (CO2) 09/17/2022 30  20 - 32 mmol/L Final     Anion Gap 09/17/2022 6  3 - 14 mmol/L Final     Urea Nitrogen 09/17/2022 16  7 - 30 mg/dL Final     Creatinine 09/17/2022 0.89  0.66 - 1.25 mg/dL Final     Calcium 09/17/2022 10.0  8.5 - 10.1 mg/dL Final     Glucose 09/17/2022 118 (H)  70 - 99 mg/dL Final     GFR Estimate 09/17/2022 >90  >60 mL/min/1.73m2 Final    Effective December 21, 2021 eGFRcr in adults is calculated using the 2021 CKD-EPI creatinine equation which includes age and gender (Sandie et al., NE, DOI: 10.1056/DWCJqx0676516)     Troponin I High Sensitivity 09/17/2022 5  <79 ng/L Final    This Troponin-I result was obtained using a Siemens Dimension Vista High Sensitivity Troponin-I assay (TNIH). Effective 11/23/21, nine labs/sites in the [facility] switched from a Siemens [employer] Troponin I assay (CTNI) to a Siemens [employer] High-Sensitivity Troponin I assay (TNIH).     WBC Count 09/17/2022 8.9  4.0 - 11.0 10e3/uL Final     RBC Count 09/17/2022 5.19  4.40 - 5.90 10e6/uL Final     Hemoglobin 09/17/2022 16.2  13.3 - 17.7 g/dL Final     Hematocrit 09/17/2022 46.9  40.0 - 53.0 % Final     MCV 09/17/2022 90  78 - 100 fL Final     MCH 09/17/2022 31.2  26.5 - 33.0 pg Final     MCHC 09/17/2022 34.5  31.5 - 36.5 g/dL Final     RDW  09/17/2022 12.5  10.0 - 15.0 % Final     Platelet Count 09/17/2022 264  150 - 450 10e3/uL Final     % Neutrophils 09/17/2022 61  % Final     % Lymphocytes 09/17/2022 28  % Final     % Monocytes 09/17/2022 8  % Final     % Eosinophils 09/17/2022 2  % Final     % Basophils 09/17/2022 1  % Final     % Immature Granulocytes 09/17/2022 0  % Final     NRBCs per 100 WBC 09/17/2022 0  <1 /100 Final     Absolute Neutrophils 09/17/2022 5.4  1.6 - 8.3 10e3/uL Final     Absolute Lymphocytes 09/17/2022 2.5  0.8 - 5.3 10e3/uL Final     Absolute Monocytes 09/17/2022 0.7  0.0 - 1.3 10e3/uL Final     Absolute Eosinophils 09/17/2022 0.2  0.0 - 0.7 10e3/uL Final     Absolute Basophils 09/17/2022 0.1  0.0 - 0.2 10e3/uL Final     Absolute Immature Granulocytes 09/17/2022 0.0  <=0.4 10e3/uL Final     Absolute NRBCs 09/17/2022 0.0  10e3/uL Final     D-Dimer Quantitative 09/17/2022 0.34  0.00 - 0.50 ug/mL FEU Final     Imaging:  EXAM: MR BRAIN W/O and W CONTRAST 9/17/2022 11:57 PM        FINDINGS:  INTRACRANIAL CONTENTS: No acute or subacute infarct. No mass, acute hemorrhage, or extra-axial fluid collections. Normal brain parenchymal signal for age. Normal ventricles and sulci for age. Normal position of the cerebellar tonsils. No pathologic contrast enhancement.   SELLA: No abnormality accounting for technique.   OSSEOUS STRUCTURES/SOFT TISSUES: Normal marrow signal. The major intracranial vascular flow voids are maintained.    ORBITS: No abnormality accounting for technique.   SINUSES/MASTOIDS: No paranasal sinus mucosal disease. No middle ear or mastoid effusion.                                                                     IMPRESSION:  1.  No acute intracranial process. Specifically, no acute intracranial hemorrhage, focal mass lesion, or acute infarct.        Sincerely,    Matilde Alvares PA-C

## 2022-10-21 NOTE — PROGRESS NOTES
Denny is a 42 year old who is being evaluated via a billable video visit.      How would you like to obtain your AVS? MyChart  If the video visit is dropped, the invitation should be resent by: Text to cell phone: [phone]  Will anyone else be joining your video visit? No    Assessment/ Impression:   1. 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 He had an MRI brain w/wo at last visit which was normal.  He does endorse throbbing and feeling muffled in his head when he stands up.  This is concerning due to his elevated blood pressure.  Dicussed that his lisinopril is likely contributing to his cough and as he is seeing neurology may need to switch to Verapamil or Propanolol for blood pressure. Will not prescribe Triptan due to uncontrolled blood pressure and defer to Neurology.  - Adult Post Covid Clinic Referral    2. Post-COVID chronic concentration deficit  Patient with concentration issues after having COVID. He noticed he is having more difficulty with word finding and expressing himself.  Discussed referral to occupational therapy.   -Occupational Therapy Referral; Future    3. Post-COVID chronic fatigue  Patient with fatigue that is worse as day progresses. Discussed energy conservation and provided information on fatigue management.  Also discussed referral to Occupational therapy for the COVID-19 program. Also discussed his CPAP which is new needs to likely be adjusted to his settings which may be contributing to his fatigue, concentration, headaches and recent blood pressure issues.   -Occupational Therapy Referral; Future    Plan:  1. I reviewed present knowledge on long-Covid.  Education was provided and question were answered.  2. Orders/Referrals as above  3. Will advised patient on test results  4. I will follow up with Denny Herrera in 1 month. I will review progress and consider need for any other therapeutic  "interventions. If there are any questions and/or concerns he will call the clinic.      On day of encounter time spent in chart review and with patient in consultation, exam, education, coordination of care, review of outside charts/data and documentation: 70 minutes     I have attempted to proof read for major spelling errors and apologize for any minor errors I may have missed.        _________________________________  [facility] PHYSICAL MEDICINE AND REHABILITATION CLINIC [facility]   This 42 year old male presents to the [facility] Post-COVID clinic as a new consult to evaluate continuing symptoms after COVID infection initially diagnosed 8/1/22.  Denny Herrera presented to ED on 8/1/22 complaining of fever, chills, headache, generalized aches and pains, fatigue and weakness. Treatment was Paxlovid.    Denny Herrera experienced complications of Chronic headache.  Patient recovered in 10 days and felt back to normal at 15 day.  He then developed severe headaches after 20 days. Continuing symptoms include fatigue, generalized aches, cough, headache, chest pain/tightness, dizziness, brain fog, distractibility, anxiety and depression.  Patient has notices his blood pressure is elevated. He is averaging from 150-160/95.  Patient is taking Excedrin and this will help prior to his headaches and noticed this helps.  He states he already has an appointment with neurology. He does hear his heartbeat with his headaches. Patient will also feel a \"muffled\" feeling when he stands up.   Patient has notice he is waking up at night, but he falls asleep quickly. He will sleep around 9 and wakes up at 3-4 every day.  He does use a CPAP.   He is working with a therapist on his anxiety and depression.  Patient has notice his cough is more annoying throughout the day.  He noticed when discussing this became worse when he had his medication increased " ( lisinopril) .  He will sometimes feel a little twinge of pain.  He does feel like his heart will flutter occasionally. Patient has noticed concentration is hard due to his headache.  Patient has noticed he is forgetting words as well now.  He notices this is worse when he is tired.   Past medical history is significant for Hypertension, depression, anxiety, obesity/morbid obesity and sleep apnea. The patient was vaccinated against COVID X3.  Previous activity was full time work.    History of COVID-19 infection: 8/1/22  Date of first symptoms: 8/1/22  Diagnosis: antigen  Hospitalization: No  Treatment: Paxlovid  Current Symptoms: See subjective  Goals of Care: Decrease headaches, increase energy, decrease anxiety, decrease depression, improve thinking, improve quality of life and return to work      PHQ Assesment Total Score(s) 10/21/2022   PHQ-9 Score 10   Some recent data might be hidden       JAMIE-7 Results 5/21/2018   JAMIE 7 TOTAL SCORE 4 (minimal anxiety)   Some recent data might be hidden     PTSD Screen Score 10/21/2022   Have you ever experienced this kind of event? Yes   PTSD Screen (Score of 3 or more suggests positive screen) 2   Some recent data might be hidden     PROMIS 29  Scoring Physical Function (higher score better) (range: 4 - 20) 18 (   Sum of 4 Physical Function Questions)   Scoring Anxiety (lower score better) (range: 4 - 20) 10 (   Sum of 4 Anxiety Questions)   Scoring Depression (lower score better) (range: 4 - 20) 9 (   Sum of 4 Depression Questions)   Scoring Fatigue (lower score better) (range: 4 - 20) 16 (   Sum of 4 Fatigue [place] )   Scoring Sleep Disturbance (lower score better) (range: 4 - 20) 16 (   Sum of 4 Sleep Disturbance Questions)   Scoring Satisfaction with Social Role (higher score better) (range: 4 - 20) 8 (   Sum of 4 Satisfaction with Social Role Questions)   Scoring Pain Interference (lower score better) (range: 4 - 20) 16 (   Sum of 4 Pain Interference Questions)          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     Raynaud disease      Raynaud's syndrome 3/14/2014       Past Surgical History:   Procedure Laterality Date     ARTHROSCOPY KNEE Right 6/15/2016    Procedure: ARTHROSCOPY KNEE;  Surgeon: Tim Valdez MD;  Location: [facility]     ARTHROTOMY WITH FRESH OSTEOCHONDRAL ALLOGRAFT KNEE Right 6/15/2016    Procedure: ARTHROTOMY WITH FRESH OSTEOCHONDRAL ALLOGRAFT KNEE;  Surgeon: Tim Valdez MD;  Location: [facility] OR     BACK SURGERY      C5-6 arthroplasty, replaced herniated disc     COLONOSCOPY N/A 8/19/2015    Procedure: COLONOSCOPY;  Surgeon: Timbo Greenberg MD;  Location:  GI     HEMORRHOIDECTOMY EXTERNAL N/A 3/19/2019    Procedure: External hemorrhoidectomy;  Surgeon: Patria Roberson MD;  Location: [facility] OR     ORTHOPEDIC SURGERY       OSTEOTOMY TIBIA Right 6/15/2016    Procedure: OSTEOTOMY TIBIA;  Surgeon: Tim Valdez MD;  Location: [facility] OR     REMOVE HARDWARE KNEE Right 6/15/2016    Procedure: REMOVE HARDWARE KNEE;  Surgeon: Tim Valdez MD;  Location: [facility] OR     ZZC NONSPECIFIC PROCEDURE  1998    10 surgeries total on both knees. 2 for R knee, 3 L knee, ACL and meniscus arthroscopic procedures x 3, 2 open surgery       Family History   Problem Relation Age of Onset     Family History Negative Other         neg for RA, IBD, psoriasis, does not know much about his father side of his family, neg for gout     Attention Deficit Disorder Other      Arthritis Mother         back DJD     Alcohol/Drug Mother       Depression Mother      Endocrine Disease Mother      Anxiety Disorder Mother      Back Pain Mother      Arthritis Brother         back DJD     Heart Disease Brother         MI at 40     Hypertension Brother      Bipolar Disorder Brother      Arthritis Maternal Grandmother         back DJD     Circulatory Maternal Grandmother      Eye Disorder Maternal Grandmother      Back Pain Maternal Grandmother      Cancer Maternal Aunt         ?type     Hypertension Brother      Circulatory Maternal Grandfather      Eye Disorder Maternal Grandfather      Hypertension Other         maternal grandparents     Depression Other      Alcohol/Drug Brother      Back Pain Brother      Heart Disease Father      Heart Disease Paternal Grandmother        Social History     Tobacco Use     Smoking status: Never     Smokeless tobacco: Never     Tobacco comments:     never smoked   Substance Use Topics     Alcohol use: Yes     Alcohol/week: 0.0 - 1.7 standard drinks     Comment:  rarely (up to 4 beers a month)     Drug use: No         Current Outpatient Medications:      allopurinol (ZYLOPRIM) 300 MG tablet, Take 1 tablet (300 mg) by mouth daily, Disp: 90 tablet, Rfl: 3     buPROPion (WELLBUTRIN XL) 300 MG 24 hr tablet, TAKE 1 TABLET EVERY MORNING (DISCONTINUE 150 MG), Disp: 90 tablet, Rfl: 3     fenofibrate (TRIGLIDE/LOFIBRA) 160 MG tablet, TAKE 1 TABLET DAILY, Disp: 90 tablet, Rfl: 0     lisinopril (ZESTRIL) 20 MG tablet, Take 1 tablet (20 mg) by mouth daily, Disp: 90 tablet, Rfl: 1     multivitamin w/minerals (THERA-VIT-M) tablet, Take 1 tablet by mouth daily, Disp: , Rfl:      Omega-3 Fatty Acids (OMEGA-3 FISH OIL PO), Take 1 capsule by mouth daily, Disp: , Rfl:      polyethylene glycol (MIRALAX) 17 GM/Dose powder, Take 17 g (1 capful) by mouth daily, Disp: 510 g, Rfl: 1     prochlorperazine (COMPAZINE) 10 MG tablet, Take 1 tablet (10 mg) by mouth every 8 hours as needed for nausea or other (headache), Disp: 12 tablet, Rfl: 0      VITAMIN D, CHOLECALCIFEROL, PO, Take by mouth daily, Disp: , Rfl:     Answers to ROS/HPI submitted by patient on 10/21/22  Review of Systems   Constitutional: Positive for fatigue. Negative for chills and fever.   HENT: Positive for ear pain, sinus pain and tinnitus. Negative for ear discharge, hearing loss, mouth sores, nosebleeds and sore throat.    Eyes: Negative for pain and redness.   Respiratory: Positive for cough. Negative for shortness of breath and wheezing.    Cardiovascular: Positive for chest pain. Negative for palpitations.   Gastrointestinal: Positive for heartburn. Negative for abdominal pain, constipation, diarrhea, nausea, rectal pain and vomiting.   Endocrine: Negative for polydipsia and polyphagia.   Genitourinary: Negative for difficulty urinating, dysuria, flank pain, hematuria and urgency.   Musculoskeletal: Positive for arthralgias, back pain, myalgias and neck pain. Negative for joint swelling.   Neurological: Positive for dizziness, tremors, light-headedness and headaches. Negative for seizures, weakness and numbness.   Psychiatric/Behavioral: Positive for decreased concentration. Negative for hallucinations. The patient is nervous/anxious.          Objective    Vitals:  No vitals were obtained today due to virtual visit.    Physical Exam   GENERAL: Healthy, alert and no distress  EYES: Eyes grossly normal to inspection.  No discharge or erythema, or obvious scleral/conjunctival abnormalities.  RESP: No audible wheeze, cough, or visible cyanosis.  No visible retractions or increased work of breathing.    SKIN: Visible skin clear. No significant rash, abnormal pigmentation or lesions.  NEURO: Cranial nerves grossly intact.  Mentation and speech appropriate for age.  PSYCH: Mentation appears normal, affect normal/bright, judgement and insight intact, normal speech and appearance well-groomed.            CRP Inflammation   Date Value Ref Range Status   03/08/2013 6.6 0.0 - 8.0 mg/L Final       Sed Rate   Date Value Ref Range Status   03/08/2013 8 0 - 15 mm/h Final     Erythrocyte Sedimentation Rate   Date Value Ref Range Status   09/08/2022 9 0 - 15 mm/hr Final      Last Renal Panel:  Sodium   Date Value Ref Range Status   09/17/2022 140 133 - 144 mmol/L Final   12/09/2020 140 133 - 144 mmol/L Final     Potassium   Date Value Ref Range Status   09/17/2022 3.7 3.4 - 5.3 mmol/L Final   12/09/2020 3.5 3.4 - 5.3 mmol/L Final     Chloride   Date Value Ref Range Status   09/17/2022 104 94 - 109 mmol/L Final   12/09/2020 106 94 - 109 mmol/L Final     Carbon Dioxide   Date Value Ref Range Status   12/09/2020 31 20 - 32 mmol/L Final     Carbon Dioxide (CO2)   Date Value Ref Range Status   09/17/2022 30 20 - 32 mmol/L Final     Anion Gap   Date Value Ref Range Status   09/17/2022 6 3 - 14 mmol/L Final   12/09/2020 3 3 - 14 mmol/L Final     Glucose   Date Value Ref Range Status   09/17/2022 118 (H) 70 - 99 mg/dL Final   12/09/2020 97 70 - 99 mg/dL Final     Urea Nitrogen   Date Value Ref Range Status   09/17/2022 16 7 - 30 mg/dL Final   12/09/2020 15 7 - 30 mg/dL Final     Creatinine   Date Value Ref Range Status   09/17/2022 0.89 0.66 - 1.25 mg/dL Final   12/09/2020 1.20 0.66 - 1.25 mg/dL Final     GFR Estimate   Date Value Ref Range Status   09/17/2022 >90 >60 mL/min/1.73m2 Final     Comment:     Effective December 21, 2021 eGFRcr in adults is calculated using the 2021 CKD-EPI creatinine equation which includes age and gender (Sandie et al., NEJM, DOI: 10.1056/TAYVfg4131708)   12/09/2020 74 >60 mL/min/[1.73_m2] Final     Comment:     Non  GFR Calc  Starting 12/18/2018, serum creatinine based estimated GFR (eGFR) will be   calculated using the Chronic Kidney Disease Epidemiology Collaboration   (CKD-EPI) equation.       Calcium   Date Value Ref Range Status   09/17/2022 10.0 8.5 - 10.1 mg/dL Final   12/09/2020 10.5 (H) 8.5 - 10.1 mg/dL Final     Albumin   Date Value Ref Range Status   09/08/2022  4.2 3.4 - 5.0 g/dL Final   12/09/2020 4.1 3.4 - 5.0 g/dL Final      Lab Results   Component Value Date    WBC 8.9 09/17/2022    WBC 7.9 12/09/2020     Lab Results   Component Value Date    RBC 5.19 09/17/2022    RBC 4.25 12/09/2020     Lab Results   Component Value Date    HGB 16.2 09/17/2022    HGB 13.4 12/09/2020     Lab Results   Component Value Date    HCT 46.9 09/17/2022    HCT 38.8 12/09/2020     No components found for: MCT  Lab Results   Component Value Date    MCV 90 09/17/2022    MCV 91 12/09/2020     Lab Results   Component Value Date    MCH 31.2 09/17/2022    MCH 31.5 12/09/2020     Lab Results   Component Value Date    MCHC 34.5 09/17/2022    MCHC 34.5 12/09/2020     Lab Results   Component Value Date    RDW 12.5 09/17/2022    RDW 12.5 12/09/2020     Lab Results   Component Value Date     09/17/2022     12/09/2020      Lab Results   Component Value Date    A1C 5.2 05/04/2022    A1C 5.4 07/02/2020      TSH   Date Value Ref Range Status   09/08/2022 2.00 0.40 - 4.00 mU/L Final   08/11/2014 1.28 0.40 - 4.00 mU/L Final     Comment:     Effective 7/30/2014, the reference range for this assay has changed to reflect   new instrumentation/methodology.        Lab Results   Component Value Date    VITDT 35 09/08/2022      No results for input(s): MAG in the last 52782 hours.  Last Comprehensive Metabolic Panel:  Sodium   Date Value Ref Range Status   09/17/2022 140 133 - 144 mmol/L Final   12/09/2020 140 133 - 144 mmol/L Final     Potassium   Date Value Ref Range Status   09/17/2022 3.7 3.4 - 5.3 mmol/L Final   12/09/2020 3.5 3.4 - 5.3 mmol/L Final     Chloride   Date Value Ref Range Status   09/17/2022 104 94 - 109 mmol/L Final   12/09/2020 106 94 - 109 mmol/L Final     Carbon Dioxide   Date Value Ref Range Status   12/09/2020 31 20 - 32 mmol/L Final     Carbon Dioxide (CO2)   Date Value Ref Range Status   09/17/2022 30 20 - 32 mmol/L Final     Anion Gap   Date Value Ref Range Status   09/17/2022 6  3 - 14 mmol/L Final   12/09/2020 3 3 - 14 mmol/L Final     Glucose   Date Value Ref Range Status   09/17/2022 118 (H) 70 - 99 mg/dL Final   12/09/2020 97 70 - 99 mg/dL Final     Urea Nitrogen   Date Value Ref Range Status   09/17/2022 16 7 - 30 mg/dL Final   12/09/2020 15 7 - 30 mg/dL Final     Creatinine   Date Value Ref Range Status   09/17/2022 0.89 0.66 - 1.25 mg/dL Final   12/09/2020 1.20 0.66 - 1.25 mg/dL Final     GFR Estimate   Date Value Ref Range Status   09/17/2022 >90 >60 mL/min/1.73m2 Final     Comment:     Effective December 21, 2021 eGFRcr in adults is calculated using the 2021 CKD-EPI creatinine equation which includes age and gender (Sandie becker al., NEJ, DOI: 10.1056/OINYup5293381)   12/09/2020 74 >60 mL/min/[1.73_m2] Final     Comment:     Non  GFR Calc  Starting 12/18/2018, serum creatinine based estimated GFR (eGFR) will be   calculated using the Chronic Kidney Disease Epidemiology Collaboration   (CKD-EPI) equation.       Calcium   Date Value Ref Range Status   09/17/2022 10.0 8.5 - 10.1 mg/dL Final   12/09/2020 10.5 (H) 8.5 - 10.1 mg/dL Final     Bilirubin Total   Date Value Ref Range Status   09/08/2022 0.5 0.2 - 1.3 mg/dL Final   12/09/2020 0.5 0.2 - 1.3 mg/dL Final     Alkaline Phosphatase   Date Value Ref Range Status   09/08/2022 34 (L) 40 - 150 U/L Final   12/09/2020 27 (L) 40 - 150 U/L Final     ALT   Date Value Ref Range Status   09/08/2022 89 (H) 0 - 70 U/L Final   12/09/2020 65 0 - 70 U/L Final     AST   Date Value Ref Range Status   09/08/2022 48 (H) 0 - 45 U/L Final   12/09/2020 33 0 - 45 U/L Final     Most Recent D-dimer:  Recent Labs   Lab Test 09/17/22  1929   DD 0.34       Admission on 09/17/2022, Discharged on 09/18/2022   Component Date Value Ref Range Status     Ventricular Rate 09/17/2022 86  BPM Final     Atrial Rate 09/17/2022 86  BPM Final     TX Interval 09/17/2022 190  ms Final     QRS Duration 09/17/2022 86  ms Final     QT 09/17/2022 350  ms Final      QTc 09/17/2022 418  ms Final     P Axis 09/17/2022 48  degrees Final     R AXIS 09/17/2022 53  degrees Final     T Axis 09/17/2022 63  degrees Final     Interpretation ECG 09/17/2022    Final                    Value:Sinus rhythm  Normal ECG  When compared with ECG of 17-SEP-2022 18:06, (unconfirmed)  No significant change was found  Confirmed by GENERATED REPORT, COMPUTER ([id number]),  Hemant Willard ([id number]) on 9/17/2022 8:14:10 PM       Sodium 09/17/2022 140  133 - 144 mmol/L Final     Potassium 09/17/2022 3.7  3.4 - 5.3 mmol/L Final     Chloride 09/17/2022 104  94 - 109 mmol/L Final     Carbon Dioxide (CO2) 09/17/2022 30  20 - 32 mmol/L Final     Anion Gap 09/17/2022 6  3 - 14 mmol/L Final     Urea Nitrogen 09/17/2022 16  7 - 30 mg/dL Final     Creatinine 09/17/2022 0.89  0.66 - 1.25 mg/dL Final     Calcium 09/17/2022 10.0  8.5 - 10.1 mg/dL Final     Glucose 09/17/2022 118 (H)  70 - 99 mg/dL Final     GFR Estimate 09/17/2022 >90  >60 mL/min/1.73m2 Final    Effective December 21, 2021 eGFRcr in adults is calculated using the 2021 CKD-EPI creatinine equation which includes age and gender (Sandie et al., NE, DOI: 10.1056/NPFOap7223040)     Troponin I High Sensitivity 09/17/2022 5  <79 ng/L Final    This Troponin-I result was obtained using a Siemens Dimension Vista High Sensitivity Troponin-I assay (TNIH). Effective 11/23/21, nine labs/sites in the [facility] switched from a Siemens [place] [employer] Troponin I assay (CTNI) to a Siemens [place] High-Sensitivity Troponin I assay (TNIH).     WBC Count 09/17/2022 8.9  4.0 - 11.0 10e3/uL Final     RBC Count 09/17/2022 5.19  4.40 - 5.90 10e6/uL Final     Hemoglobin 09/17/2022 16.2  13.3 - 17.7 g/dL Final     Hematocrit 09/17/2022 46.9  40.0 - 53.0 % Final     MCV 09/17/2022 90  78 - 100 fL Final     MCH 09/17/2022 31.2  26.5 - 33.0 pg Final     MCHC 09/17/2022 34.5  31.5 - 36.5 g/dL Final     RDW 09/17/2022 12.5  10.0 - 15.0 % Final     Platelet Count 09/17/2022  264  150 - 450 10e3/uL Final     % Neutrophils 09/17/2022 61  % Final     % Lymphocytes 09/17/2022 28  % Final     % Monocytes 09/17/2022 8  % Final     % Eosinophils 09/17/2022 2  % Final     % Basophils 09/17/2022 1  % Final     % Immature Granulocytes 09/17/2022 0  % Final     NRBCs per 100 WBC 09/17/2022 0  <1 /100 Final     Absolute Neutrophils 09/17/2022 5.4  1.6 - 8.3 10e3/uL Final     Absolute Lymphocytes 09/17/2022 2.5  0.8 - 5.3 10e3/uL Final     Absolute Monocytes 09/17/2022 0.7  0.0 - 1.3 10e3/uL Final     Absolute Eosinophils 09/17/2022 0.2  0.0 - 0.7 10e3/uL Final     Absolute Basophils 09/17/2022 0.1  0.0 - 0.2 10e3/uL Final     Absolute Immature Granulocytes 09/17/2022 0.0  <=0.4 10e3/uL Final     Absolute NRBCs 09/17/2022 0.0  10e3/uL Final     D-Dimer Quantitative 09/17/2022 0.34  0.00 - 0.50 ug/mL FEU Final     Imaging:  EXAM: MR BRAIN W/O and W CONTRAST 9/17/2022 11:57 PM        FINDINGS:  INTRACRANIAL CONTENTS: No acute or subacute infarct. No mass, acute hemorrhage, or extra-axial fluid collections. Normal brain parenchymal signal for age. Normal ventricles and sulci for age. Normal position of the cerebellar tonsils. No pathologic contrast enhancement.   SELLA: No abnormality accounting for technique.   OSSEOUS STRUCTURES/SOFT TISSUES: Normal marrow signal. The major intracranial vascular flow voids are maintained.    ORBITS: No abnormality accounting for technique.   SINUSES/MASTOIDS: No paranasal sinus mucosal disease. No middle ear or mastoid effusion.                                                                     IMPRESSION:  1.  No acute intracranial process. Specifically, no acute intracranial hemorrhage, focal mass lesion, or acute infarct.      Video-Visit Details    Video visit Start time:10:30    Type of service:  Video Visit    Video End Time:11:10 AM    Originating Location (pt. Location): Home    Distant Location (provider location):  HOME    Platform used for Video Visit:  AmWell

## 2022-10-24 ENCOUNTER — OFFICE VISIT (OUTPATIENT)
Dept: URGENT CARE | Facility: URGENT CARE | Age: 42
End: 2022-10-24
Payer: COMMERCIAL

## 2022-10-24 ENCOUNTER — TELEPHONE (OUTPATIENT)
Dept: PHYSICAL MEDICINE AND REHAB | Facility: CLINIC | Age: 42
End: 2022-10-24

## 2022-10-24 VITALS
WEIGHT: 266 LBS | HEART RATE: 70 BPM | OXYGEN SATURATION: 100 % | DIASTOLIC BLOOD PRESSURE: 99 MMHG | BODY MASS INDEX: 36.08 KG/M2 | TEMPERATURE: 97.9 F | SYSTOLIC BLOOD PRESSURE: 147 MMHG

## 2022-10-24 DIAGNOSIS — I10 HYPERTENSION, UNSPECIFIED TYPE: Primary | ICD-10-CM

## 2022-10-24 PROCEDURE — 99214 OFFICE O/P EST MOD 30 MIN: CPT

## 2022-10-24 RX ORDER — HYDROCHLOROTHIAZIDE 12.5 MG/1
12.5 TABLET ORAL DAILY
Qty: 30 TABLET | Refills: 0 | Status: SHIPPED | OUTPATIENT
Start: 2022-10-24

## 2022-10-25 NOTE — PROGRESS NOTES
SUBJECTIVE:   Denny Herrera is a 42 year old male who is here with his son for complains of not feeling well and high BP noticeably after his 4th covid shot today at 9am.  He has also been dealing with long covid since august.  He has also been dealing high BP and takes 20 mg of lisinopril once daily.  He denies a history of productive cough, chills, myalgias, wheezing, shortness of breath, weakness, nausea and vomiting and denies a history of asthma. Patient denies smoke cigarettes.  He is feeling alittle dizzy and has been in ED for high BP in the past     OBJECTIVE:    BP (!) 147/99   Pulse 70   Temp 97.9  F (36.6  C) (Tympanic)   Wt 120.7 kg (266 lb)   SpO2 100%   BMI 36.08 kg/m      He appears well, vital signs are as noted by the nurse. Ears normal.  Throat and pharynx normal.  Neck supple. No adenopathy in the neck.. The chest is clear, without wheezes or rales. CVS exam: S1, S2 normal, no murmur, click, rub or gallop, regular rate and rhythm, chest is clear without rales or wheezing.     ASSESSMENT:    Diagnosis Comments   1. Hypertension, unspecified type  hydrochlorothiazide (HYDRODIURIL) 12.5 MG tablet         Will add low dose hydrochlorothiazide  Follow up with pcp    PLAN:  Symptomatic therapy suggested: push fluids, rest and Return office visit if symptoms persist or worsen. Call or return to clinic prn if these symptoms worsen or fail to improve as anticipated.

## 2022-10-27 ENCOUNTER — MEDICAL CORRESPONDENCE (OUTPATIENT)
Dept: HEALTH INFORMATION MANAGEMENT | Facility: CLINIC | Age: 42
End: 2022-10-27

## 2022-10-27 ENCOUNTER — TRANSFERRED RECORDS (OUTPATIENT)
Dept: HEALTH INFORMATION MANAGEMENT | Facility: CLINIC | Age: 42
End: 2022-10-27

## 2022-10-27 ENCOUNTER — APPOINTMENT (OUTPATIENT)
Dept: GENERAL RADIOLOGY | Facility: CLINIC | Age: 42
End: 2022-10-27
Attending: EMERGENCY MEDICINE
Payer: COMMERCIAL

## 2022-10-27 ENCOUNTER — HOSPITAL ENCOUNTER (EMERGENCY)
Facility: CLINIC | Age: 42
Discharge: HOME | End: 2022-10-27
Attending: EMERGENCY MEDICINE | Admitting: EMERGENCY MEDICINE
Payer: COMMERCIAL

## 2022-10-27 VITALS
TEMPERATURE: 98.3 F | OXYGEN SATURATION: 95 % | BODY MASS INDEX: 35.89 KG/M2 | RESPIRATION RATE: 17 BRPM | DIASTOLIC BLOOD PRESSURE: 98 MMHG | HEART RATE: 76 BPM | SYSTOLIC BLOOD PRESSURE: 150 MMHG | WEIGHT: 265 LBS | HEIGHT: 72 IN

## 2022-10-27 DIAGNOSIS — R07.9 CHEST PAIN, UNSPECIFIED TYPE: ICD-10-CM

## 2022-10-27 LAB
ALBUMIN SERPL-MCNC: 4.5 G/DL (ref 3.4–5)
ALP SERPL-CCNC: 32 U/L (ref 40–150)
ALT SERPL W P-5'-P-CCNC: 79 U/L (ref 0–70)
ANION GAP SERPL CALCULATED.3IONS-SCNC: 3 MMOL/L (ref 3–14)
AST SERPL W P-5'-P-CCNC: 48 U/L (ref 0–45)
ATRIAL RATE - MUSE: 79 BPM
BASOPHILS # BLD AUTO: 0.1 10E3/UL (ref 0–0.2)
BASOPHILS NFR BLD AUTO: 1 %
BILIRUB SERPL-MCNC: 0.6 MG/DL (ref 0.2–1.3)
BUN SERPL-MCNC: 16 MG/DL (ref 7–30)
CALCIUM SERPL-MCNC: 10.3 MG/DL (ref 8.5–10.1)
CHLORIDE BLD-SCNC: 101 MMOL/L (ref 94–109)
CO2 SERPL-SCNC: 31 MMOL/L (ref 20–32)
CREAT SERPL-MCNC: 1.06 MG/DL (ref 0.66–1.25)
D DIMER PPP FEU-MCNC: 0.31 UG/ML FEU (ref 0–0.5)
DIASTOLIC BLOOD PRESSURE - MUSE: NORMAL MMHG
EOSINOPHIL # BLD AUTO: 0.2 10E3/UL (ref 0–0.7)
EOSINOPHIL NFR BLD AUTO: 2 %
ERYTHROCYTE [DISTWIDTH] IN BLOOD BY AUTOMATED COUNT: 12.4 % (ref 10–15)
GFR SERPL CREATININE-BSD FRML MDRD: 89 ML/MIN/1.73M2
GLUCOSE BLD-MCNC: 127 MG/DL (ref 70–99)
HCT VFR BLD AUTO: 45.5 % (ref 40–53)
HGB BLD-MCNC: 15.6 G/DL (ref 13.3–17.7)
HOLD SPECIMEN: NORMAL
IMM GRANULOCYTES # BLD: 0 10E3/UL
IMM GRANULOCYTES NFR BLD: 0 %
INTERPRETATION ECG - MUSE: NORMAL
LYMPHOCYTES # BLD AUTO: 2.4 10E3/UL (ref 0.8–5.3)
LYMPHOCYTES NFR BLD AUTO: 32 %
MCH RBC QN AUTO: 31 PG (ref 26.5–33)
MCHC RBC AUTO-ENTMCNC: 34.3 G/DL (ref 31.5–36.5)
MCV RBC AUTO: 91 FL (ref 78–100)
MONOCYTES # BLD AUTO: 0.6 10E3/UL (ref 0–1.3)
MONOCYTES NFR BLD AUTO: 9 %
NEUTROPHILS # BLD AUTO: 4.2 10E3/UL (ref 1.6–8.3)
NEUTROPHILS NFR BLD AUTO: 56 %
NRBC # BLD AUTO: 0 10E3/UL
NRBC BLD AUTO-RTO: 0 /100
P AXIS - MUSE: 46 DEGREES
PLATELET # BLD AUTO: 266 10E3/UL (ref 150–450)
POTASSIUM BLD-SCNC: 3.9 MMOL/L (ref 3.4–5.3)
PR INTERVAL - MUSE: 200 MS
PROT SERPL-MCNC: 9.1 G/DL (ref 6.8–8.8)
QRS DURATION - MUSE: 80 MS
QT - MUSE: 356 MS
QTC - MUSE: 408 MS
R AXIS - MUSE: 47 DEGREES
RBC # BLD AUTO: 5.03 10E6/UL (ref 4.4–5.9)
SODIUM SERPL-SCNC: 135 MMOL/L (ref 133–144)
SYSTOLIC BLOOD PRESSURE - MUSE: NORMAL MMHG
T AXIS - MUSE: 48 DEGREES
TROPONIN I SERPL HS-MCNC: 11 NG/L
VENTRICULAR RATE- MUSE: 79 BPM
WBC # BLD AUTO: 7.5 10E3/UL (ref 4–11)

## 2022-10-27 PROCEDURE — 85379 FIBRIN DEGRADATION QUANT: CPT | Performed by: EMERGENCY MEDICINE

## 2022-10-27 PROCEDURE — 71046 X-RAY EXAM CHEST 2 VIEWS: CPT

## 2022-10-27 PROCEDURE — 84484 ASSAY OF TROPONIN QUANT: CPT | Performed by: EMERGENCY MEDICINE

## 2022-10-27 PROCEDURE — 80053 COMPREHEN METABOLIC PANEL: CPT | Performed by: EMERGENCY MEDICINE

## 2022-10-27 PROCEDURE — 36415 COLL VENOUS BLD VENIPUNCTURE: CPT | Performed by: EMERGENCY MEDICINE

## 2022-10-27 PROCEDURE — 85025 COMPLETE CBC W/AUTO DIFF WBC: CPT | Performed by: EMERGENCY MEDICINE

## 2022-10-27 PROCEDURE — 93005 ELECTROCARDIOGRAM TRACING: CPT

## 2022-10-27 PROCEDURE — 99285 EMERGENCY DEPT VISIT HI MDM: CPT | Mod: 25

## 2022-10-27 ASSESSMENT — ACTIVITIES OF DAILY LIVING (ADL): ADLS_ACUITY_SCORE: 35

## 2022-10-27 NOTE — ED NOTES
Rapid Assessment Note    History:   Denny Herrera is a 42 year old male who presents with chest discomfort and Hypertension concerns. The patient reports that he has not felt well for a long time. He had long COVID starting on August 1st and has been working with a neurologist to figure out problems with frequent headaches. He says that they have done a full workup and have not found a cause for his headaches. Tonight he says he presents because the chest discomfort is new, his hands feel slightly numb, and he has been clammy throughout the day. Started hydrochlorothiazide three days ago that was given by . The patient says that he has also been seeing a long COVID clinic.     Exam:   General:  Alert, interactive  Cardiovascular:  Well perfused  Lungs:  No respiratory distress, no accessory muscle use  Neuro:  Moving all 4 extremities  Skin:  Warm, dry  Psych:  Normal affect    Plan of Care:   I evaluated the patient and developed an initial plan of care. I discussed this plan and explained that I, or one of my partners, would be returning to complete the evaluation.     I, Hira Rosas, am serving as a scribe to document services personally performed by Brandon Lopez MD, based on my observations and the provider's statements to me.         Brandon Lopez MD  10/27/22 5509

## 2022-10-27 NOTE — ED TRIAGE NOTES
PT STATED HE HAS LONG TERM COVID AND HE HAS HEADACHES AND HE HAD SOME CHEST TIGHTNESS RECENTLY.  HTN THAT HE HAS NOTED - CHEST PRESSURE FEELS MORE INTENSE AND HIS HANDS FEEL NUB. EKG  NORMAL IN TRIAGE.      Triage Assessment     Row Name 10/27/22 6701       Triage Assessment (Adult)    Airway WDL WDL       Respiratory WDL    Respiratory WDL WDL       Skin Circulation/Temperature WDL    Skin Circulation/Temperature WDL WDL       Cardiac WDL    Cardiac WDL WDL       Peripheral/Neurovascular WDL    Peripheral Neurovascular WDL WDL       Cognitive/Neuro/Behavioral WDL    Cognitive/Neuro/Behavioral WDL WDL

## 2022-10-28 ENCOUNTER — TRANSCRIBE ORDERS (OUTPATIENT)
Dept: OTHER | Age: 42
End: 2022-10-28

## 2022-10-28 ENCOUNTER — HOSPITAL ENCOUNTER (OUTPATIENT)
Dept: OCCUPATIONAL THERAPY | Facility: CLINIC | Age: 42
Discharge: HOME | End: 2022-10-28
Attending: PHYSICIAN ASSISTANT
Payer: COMMERCIAL

## 2022-10-28 DIAGNOSIS — R41.840 POST-COVID CHRONIC CONCENTRATION DEFICIT: ICD-10-CM

## 2022-10-28 DIAGNOSIS — Z78.9 IMPAIRED INSTRUMENTAL ACTIVITIES OF DAILY LIVING (IADL): ICD-10-CM

## 2022-10-28 DIAGNOSIS — I10 HYPERTENSION, UNSPECIFIED TYPE: ICD-10-CM

## 2022-10-28 DIAGNOSIS — U09.9 POST-COVID CHRONIC FATIGUE: Primary | ICD-10-CM

## 2022-10-28 DIAGNOSIS — U09.9 POST-COVID CHRONIC CONCENTRATION DEFICIT: ICD-10-CM

## 2022-10-28 DIAGNOSIS — I10 HTN (HYPERTENSION): ICD-10-CM

## 2022-10-28 DIAGNOSIS — R07.9 CHEST PAIN: Primary | ICD-10-CM

## 2022-10-28 DIAGNOSIS — R42 DIZZINESS: Primary | ICD-10-CM

## 2022-10-28 DIAGNOSIS — R42 DIZZINESS: ICD-10-CM

## 2022-10-28 DIAGNOSIS — G93.32 POST-COVID CHRONIC FATIGUE: Primary | ICD-10-CM

## 2022-10-28 PROCEDURE — 97535 SELF CARE MNGMENT TRAINING: CPT | Mod: GO | Performed by: OCCUPATIONAL THERAPIST

## 2022-10-28 PROCEDURE — 97165 OT EVAL LOW COMPLEX 30 MIN: CPT | Mod: GO | Performed by: OCCUPATIONAL THERAPIST

## 2022-10-28 NOTE — ED PROVIDER NOTES
History     Chief Complaint:  Hypertension, Chest Pain, and Headache       HPI   Denny Herrera is a 42 year old male who presents with chest pain.  He has been dealing with long COVID type symptoms including headaches since August.  He has been also having issues with his blood pressure being elevated and was recently started on a diuretic in addition to his lisinopril, which was increased about a month ago as well.  He came in today due to some chest pain that he has been having intermittently today.  He describes the chest pain as sharp jolts of pain that are only there for a few seconds at a time.  He also developed some numbness and tingling in both of his hands today, for starting in his left hand and then going to his right hand.  That has since resolved.  He denies feeling short of breath and has not had a cough or fever.  He does have headaches that he is following with neurology, and he saw them today.  He has had an MRI of his brain within the last month which did not show anything acute.  He also follows with a long COVID clinic.    ROS:  Review of Systems   All other systems reviewed and are negative.        Allergies:  No Known Drug Allergies     Medications:    allopurinol (ZYLOPRIM) 300 MG tablet  buPROPion (WELLBUTRIN XL) 300 MG 24 hr tablet  fenofibrate (TRIGLIDE/LOFIBRA) 160 MG tablet  hydrochlorothiazide (HYDRODIURIL) 12.5 MG tablet  lisinopril (ZESTRIL) 20 MG tablet  multivitamin w/minerals (THERA-VIT-M) tablet  Omega-3 Fatty Acids (OMEGA-3 FISH OIL PO)  polyethylene glycol (MIRALAX) 17 GM/Dose powder  prochlorperazine (COMPAZINE) 10 MG tablet  VITAMIN D, CHOLECALCIFEROL, PO        Past Medical History:    Past Medical History:   Diagnosis Date     Ankle joint pain 11/9/2012     Basal cell carcinoma      Blood in semen      CARDIOVASCULAR SCREENING; LDL GOAL LESS THAN 160 6/20/2011     Depressive disorder      Elevated blood pressure reading without diagnosis of hypertension      Enthesopathy  of ankle/tarsus 12/17/2012     Gout      Hepatic steatosis 3/14/2014     Hypertension      Hypertriglyceridaemia      Hypertriglyceridemia 2/8/2013     Obesity 2/8/2013     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     Raynaud disease      Raynaud's syndrome 3/14/2014       Past Surgical History:    Past Surgical History:   Procedure Laterality Date     ARTHROSCOPY KNEE Right 6/15/2016    Procedure: ARTHROSCOPY KNEE;  Surgeon: Tim Valdez MD;  Location: [facility]     ARTHROTOMY WITH FRESH OSTEOCHONDRAL ALLOGRAFT KNEE Right 6/15/2016    Procedure: ARTHROTOMY WITH FRESH OSTEOCHONDRAL ALLOGRAFT KNEE;  Surgeon: Tim Valdez MD;  Location: [facility]     BACK SURGERY      C5-6 arthroplasty, replaced herniated disc     COLONOSCOPY N/A 8/19/2015    Procedure: COLONOSCOPY;  Surgeon: Timbo Greenberg MD;  Location: [facility]     HEMORRHOIDECTOMY EXTERNAL N/A 3/19/2019    Procedure: External hemorrhoidectomy;  Surgeon: Patria Roberson MD;  Location: [facility]     ORTHOPEDIC SURGERY       OSTEOTOMY TIBIA Right 6/15/2016    Procedure: OSTEOTOMY TIBIA;  Surgeon: Tim Valdez MD;  Location: [facility]     REMOVE HARDWARE KNEE Right 6/15/2016    Procedure: REMOVE HARDWARE KNEE;  Surgeon: Tim Valdez MD;  Location: [facility]     ZZC NONSPECIFIC PROCEDURE  1998    10 surgeries total on both knees. 2 for R knee, 3 L knee, ACL and meniscus arthroscopic procedures x 3, 2 open surgery        Family History:    family history includes Alcohol/Drug in his brother and mother; Anxiety Disorder in his mother; Arthritis in his brother, maternal grandmother, and mother; Attention Deficit Disorder in an other family member; Back Pain in his brother, maternal grandmother, and mother; Bipolar Disorder in his brother; Cancer in his maternal aunt; Circulatory in his maternal grandfather and maternal grandmother; Depression in his mother and another family member; Endocrine Disease in his mother; Eye Disorder in his maternal grandfather and maternal  grandmother; Family History Negative in an other family member; Heart Disease in his brother, father, and paternal grandmother; Hypertension in his brother, brother and another family member.    Social History:   reports that he has never smoked. He has never used smokeless tobacco. He reports current alcohol use. He reports that he does not use drugs.  PCP: Ayo Martínez     Physical Exam     Patient Vitals for the past 24 hrs:   BP Temp Temp src Pulse Resp SpO2 Height Weight   10/27/22 1819 (!) 174/104 98.3  F (36.8  C) Temporal 88 20 99 % 1.829 m (6') 120.2 kg (265 lb)        Physical Exam  Nursing note and vitals reviewed.  Constitutional:  Oriented to person, place, and time. Cooperative.   HENT:   Nose:    Nose normal.   Mouth/Throat:   Facemask in place.   Eyes:    Conjunctivae normal and EOM are normal.      Pupils are equal, round, and reactive to light.   Neck:    Trachea normal.   Cardiovascular:  Normal rate, regular rhythm, normal heart sounds and normal pulses. No murmur heard.  Pulmonary/Chest:  Effort normal and breath sounds normal.   Abdominal:   Soft. Normal appearance and bowel sounds are normal.      There is no tenderness.      There is no rebound and no CVA tenderness.   Musculoskeletal:  Extremities atraumatic x 4.   Lymphadenopathy:  No cervical adenopathy.   Neurological:   Alert and oriented to person, place, and time. Normal strength.      No cranial nerve deficit or sensory deficit. GCS eye subscore is 4. GCS verbal subscore is 5. GCS motor subscore is 6.   Skin:    Skin is intact. No rash noted.   Psychiatric:   Normal mood and affect.      Emergency Department Course   ECG:       EKG Interpretation:      Interpreted by Luis Armando Lopez MD  Time reviewed: 2010  Symptoms at time of EKG: Chest pain   Rhythm: Normal sinus   Rate: Normal  Axis: Normal  Ectopy: None  Conduction: Normal  ST Segments/ T Waves: No ST-T wave changes and No acute ischemic changes  Q Waves: None  Comparison to  prior: Unchanged from 09/17/2022    Clinical Impression: normal EKG      Imaging:  XR Chest 2 Views   Final Result   IMPRESSION: Stable chest with no acute cardiopulmonary findings seen to explain patient's chest pain clinically.         Report per radiology    Laboratory:  Labs Ordered and Resulted from Time of ED Arrival to Time of ED Departure   COMPREHENSIVE METABOLIC PANEL - Abnormal       Result Value    Sodium 135      Potassium 3.9      Chloride 101      Carbon Dioxide (CO2) 31      Anion Gap 3      Urea Nitrogen 16      Creatinine 1.06      Calcium 10.3 (*)     Glucose 127 (*)     Alkaline Phosphatase 32 (*)     AST 48 (*)     ALT 79 (*)     Protein Total 9.1 (*)     Albumin 4.5      Bilirubin Total 0.6      GFR Estimate 89     TROPONIN I - Normal    Troponin I High Sensitivity 11     D DIMER QUANTITATIVE - Normal    D-Dimer Quantitative 0.31     CBC WITH PLATELETS AND DIFFERENTIAL    WBC Count 7.5      RBC Count 5.03      Hemoglobin 15.6      Hematocrit 45.5      MCV 91      MCH 31.0      MCHC 34.3      RDW 12.4      Platelet Count 266      % Neutrophils 56      % Lymphocytes 32      % Monocytes 9      % Eosinophils 2      % Basophils 1      % Immature Granulocytes 0      NRBCs per 100 WBC 0      Absolute Neutrophils 4.2      Absolute Lymphocytes 2.4      Absolute Monocytes 0.6      Absolute Eosinophils 0.2      Absolute Basophils 0.1      Absolute Immature Granulocytes 0.0      Absolute NRBCs 0.0       Emergency Department Course:             Reviewed:  I reviewed nursing notes, vitals, past medical history, Care Everywhere and [facility]      Interventions:  Medications - No data to display     Disposition:  The patient was discharged to home.     Impression & Plan    Medical Decision Making:  This is a 42-year-old male came in for further evaluation of chest pain and paresthesias.  This is in the setting of dealing with long COVID symptoms and chronic headaches now.  Upon arrival here, his blood pressure also  was elevated.  However that did come down on its own to a more manageable level.  He had the above work-up obtained, including EKG, blood work, and chest x-ray.  His EKG is normal and unchanged from previous.  His blood work also was unremarkable including a D-dimer and troponin.  I feel that pulmonary embolism has been sufficiently ruled out as well.  At this point I do not feel that his symptoms are consistent with cardiac chest pain, and I suspect that his paresthesias might have been from hyperventilation. I feel that he is safe for discharge and outpatient management.  Given the fact that he was just started on a diuretic, I think it is reasonable to continue that for now and not start any further antihypertensive medications right now.  However he understands that might become necessary.  I recommended close follow-up with his primary care provider and certainly returning here with any concerns or worsening symptoms.    Diagnosis:    ICD-10-CM    1. Chest pain, unspecified type  R07.9            Discharge Medications:  New Prescriptions    No medications on file        10/27/2022   Luis Armando Lopez MD Lashkowitz, Seth H, MD  10/27/22 2157

## 2022-10-31 NOTE — PROGRESS NOTES
"   10/28/22 1200   Quick Adds   Type of Visit Initial Outpatient Occupational Therapy Evaluation   General Information   Start Of Care Date 10/28/22   Referring Physician Matilde Traylor   Orders Evaluate and treat as indicated   Orders Date 10/21/22   Medical Diagnosis Post-COVID chronic concentration deficit, Post-COVID chronic fatigue   Onset of Illness/Injury or Date of Surgery 08/01/22   Additional Occupational Profile Info/Pertinent History of Current Problem Patient referred to OP OT for Post-COVID clinic.  Per chart: COVID infection initially diagnosed 8/1/22, presenting to ED on 8/1/22 complaining of fever, chills, headache, generalized aches and pains, fatigue and weakness. Treatment was Paxlovid. He later experienced complications of chronic headache.  Patient recovered in 10 days and felt back to normal at 15 day.  He then developed severe headaches after 20 days. Continuing symptoms include fatigue, generalized aches, cough, headache, chest pain/tightness, dizziness, brain fog, distractibility, anxiety and depression.  Patient has notices his blood pressure is elevated. He is averaging from 150-160/95.  Patient is taking Excedrin and this will help prior to his headaches and noticed this helps.  He states he already has an appointment with neurology. He does hear his heartbeat with his headaches. Patient will also feel a \"muffled\" feeling when he stands up.   Patient has notice he is waking up at night, but he falls asleep quickly. He will sleep around 9 and wakes up at 3-4 every day.  He does use a CPAP.   He is working with a therapist on his anxiety and depression.  Patient has notice his cough is more annoying throughout the day.  He noticed when discussing this became worse when he had his medication increased ( lisinopril) .  He will sometimes feel a little twinge of pain.  He does feel like his heart will flutter occasionally. Patient has noticed concentration is hard due to his headache.  Patient has " "noticed he is forgetting words as well now.  He notices this is worse when he is tired.   Past medical history is significant for Hypertension, depression, anxiety, obesity/morbid obesity and sleep apnea. The patient was vaccinated against COVID X3.  Previous activity was full time work.   Comments/Observations Patient went to ED last night for evaluation of chest pain and paresthesisa-nothing found with testing.   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 Reports taking breaks, getting outside to get fresh air.  Hobbies: fishing/hunting, playing video games, playing cards, woodworking, 3D printing. Likes to bike but patient hasn't exercised since he has been getting headaches.  Has 3 kids.   Current Living Environment House   Prior Level - Transfers Independent   Prior Level - Ambulation Independent   Prior Level - ADLS Independent   Prior Responsibilities - IADL Meal Preparation;Housekeeping;Laundry;Shopping;Yardwork;Medication management;Finances;Driving;Work   Patient/family Goals Statement To get rid of the headaches, improve fatigue   Pain   Pain comments \"the more I have to think, the worse my headache gets.\"   Fall Risk Screen   Have you fallen 2 or more times in the past year? No   Have you fallen and had an injury in the past year? No   Abuse Screen (yes response referral indicated)   Feels Unsafe at Home or Work/School no   Feels Threatened by Someone no   Does Anyone Try to Keep You From Having Contact with Others or Doing Things Outside Your Home? no   Physical Signs of Abuse Present no   Cognitive Status Examination   Orientation Orientation to person, place and time   Level of Consciousness Alert   Follows Commands and Answers Questions 100% of the time;Able to follow multistep instructions   Personal Safety and Judgment Intact "   Memory Comments Did not formally assess today.  Anticipate concentration affected by headaches and fatigue.   Cognitive Comment Reports hat he can't process when there are loud noises.  Patient getting tested for ADHD.  Reports that his memory hasn't always been good, can't remember names.  Worsening word finding difficulties (objects)   Visual Perception   Visual Perception Comments Denies light sensitivity, unsure if vision increasing his headaches.  Trialed light plum glasses during session today with immediate relief.  Will continue to address.   Sensation   Upper Extremity Sensory Examination No deficits were identified   Activity Tolerance   Activity Tolerance Patient getting 5-6 hours of sleep a night, wakes up with a headache.  Uses a CPap machine   Instrumental Activities of Daily Living Assessment   IADL Assessment/Observations Pt independent with basic ADLs/IADLs.   Planned Therapy Interventions   Planned Therapy Interventions ADL training;Visual perception;Community/work reintegration   Adult OT Eval Goals   OT Eval Goals (Adult) 1;2;3    OT Goal 1   Goal Identifier 1-Vision   Goal Description Patient will verbalize 2-3 strategies to compensate for visual sensitivity for increased independence during ADL/IADLs and reduce headaches (computer adaptations, environmental accommodations, etc.).   Target Date 01/28/23    OT Goal 2   Goal Identifier 2-Fatigue   Goal Description Patient to verbalize 3 strategies for energy conservation/work simplification and fatigue management for improved independence with work tasks/maintenance activities/leisure activities and increase his FACIT - Fatigue score by 4 points for increased activity level.   Target Date 01/28/23    OT Goal 3   Goal Identifier 3-Concentration/Attention   Goal Description Patient will demonstrate the ability to complete 2-3 tasks simultaneously with 90% accuracy for increased concentration and attention skills to improve his ability to complete  work and IADL tasks.   Target Date 01/28/23   Clinical Impression   Criteria for Skilled Therapeutic Interventions Met Yes, treatment indicated   OT Diagnosis decreased ease of IADLs   Influenced by the following impairments fatigue, headaches, decreased concentration   Assessment of Occupational Performance 1-3 Performance Deficits   Identified Performance Deficits work, IADLs, leisure activities   Clinical Decision Making (Complexity) Low complexity   Therapy Frequency 1x/week   Predicted Duration of Therapy Intervention (days/wks) 6 weeks   Risks and Benefits of Treatment have been explained. Yes   Patient, Family & other staff in agreement with plan of care Yes   Education Assessment   Barriers To Learning No Barriers   Preferred Learning Style Listening;Reading;Demonstration;Pictures/video   Total Evaluation Time   OT Eval, Low Complexity Minutes ([id number]) 65

## 2022-11-04 ENCOUNTER — HOSPITAL ENCOUNTER (OUTPATIENT)
Dept: OCCUPATIONAL THERAPY | Facility: CLINIC | Age: 42
Discharge: HOME | End: 2022-11-04
Payer: COMMERCIAL

## 2022-11-04 DIAGNOSIS — G93.32 POST-COVID CHRONIC FATIGUE: ICD-10-CM

## 2022-11-04 DIAGNOSIS — Z78.9 IMPAIRED INSTRUMENTAL ACTIVITIES OF DAILY LIVING (IADL): ICD-10-CM

## 2022-11-04 DIAGNOSIS — U09.9 POST-COVID CHRONIC FATIGUE: ICD-10-CM

## 2022-11-04 DIAGNOSIS — R41.840 POST-COVID CHRONIC CONCENTRATION DEFICIT: Primary | ICD-10-CM

## 2022-11-04 DIAGNOSIS — U09.9 POST-COVID CHRONIC CONCENTRATION DEFICIT: Primary | ICD-10-CM

## 2022-11-04 PROCEDURE — 97535 SELF CARE MNGMENT TRAINING: CPT | Mod: GO | Performed by: OCCUPATIONAL THERAPIST

## 2022-11-08 ENCOUNTER — HOSPITAL ENCOUNTER (OUTPATIENT)
Dept: CARDIOLOGY | Facility: CLINIC | Age: 42
Discharge: HOME | End: 2022-11-08
Attending: PSYCHIATRY & NEUROLOGY | Admitting: PSYCHIATRY & NEUROLOGY
Payer: COMMERCIAL

## 2022-11-08 DIAGNOSIS — R42 DIZZINESS: ICD-10-CM

## 2022-11-08 DIAGNOSIS — I10 HTN (HYPERTENSION): ICD-10-CM

## 2022-11-08 DIAGNOSIS — R07.9 CHEST PAIN: ICD-10-CM

## 2022-11-08 LAB — LVEF ECHO: NORMAL

## 2022-11-08 PROCEDURE — 93306 TTE W/DOPPLER COMPLETE: CPT | Mod: 26 | Performed by: INTERNAL MEDICINE

## 2022-11-08 PROCEDURE — 93306 TTE W/DOPPLER COMPLETE: CPT

## 2022-11-08 PROCEDURE — 999N000208 ECHOCARDIOGRAM COMPLETE

## 2022-11-09 ENCOUNTER — OFFICE VISIT (OUTPATIENT)
Dept: CARDIOLOGY | Facility: CLINIC | Age: 42
End: 2022-11-09
Payer: COMMERCIAL

## 2022-11-09 VITALS
HEART RATE: 78 BPM | DIASTOLIC BLOOD PRESSURE: 102 MMHG | HEIGHT: 72 IN | SYSTOLIC BLOOD PRESSURE: 161 MMHG | BODY MASS INDEX: 36.56 KG/M2 | WEIGHT: 269.9 LBS

## 2022-11-09 DIAGNOSIS — E78.5 DYSLIPIDEMIA: ICD-10-CM

## 2022-11-09 DIAGNOSIS — R93.1 ABNORMAL ECHOCARDIOGRAM: Primary | ICD-10-CM

## 2022-11-09 DIAGNOSIS — R42 DIZZINESS: ICD-10-CM

## 2022-11-09 DIAGNOSIS — I10 PRIMARY HYPERTENSION: ICD-10-CM

## 2022-11-09 PROBLEM — R07.9 CHEST PAIN: Status: ACTIVE | Noted: 2022-11-09

## 2022-11-09 PROCEDURE — 99203 OFFICE O/P NEW LOW 30 MIN: CPT | Performed by: INTERNAL MEDICINE

## 2022-11-09 RX ORDER — GABAPENTIN 100 MG/1
100 CAPSULE ORAL 3 TIMES DAILY
COMMUNITY
Start: 2022-10-27

## 2022-11-09 NOTE — LETTER
11/9/2022    Ayo Martínez MD  [address]  [place] [address]    RE: Denny Herrera       Dear Colleague,     I had the pleasure of seeing Denny Herrera in the [facility].  HPI and Plan:     Denny Herrera is a pleasant 42-year-old referred here for abnormal echocardiogram.  Ever since COVID he has had difficulties with hypertension and frequent headaches.  He was deemed long hauler with COVID and referred to neurology.  Work-up for his hypertension include an echocardiogram which is abnormal and was reviewed showing moderate left ventricular hypertrophy with preserved LV and RV size and function.  Also mild dilatation of the aortic root.  Blood pressures have been quite labile.  He was told to stop taking his hydrochlorothiazide by emergency room physician because of this is only used to treat elderly people with edema.    EKG was reviewed which was normal.    Echocardiograms and EKG are consistent with hypertension.  This underscores the importance of him treating hypertension we talked about diet exercise weight loss.  Also his abnormal labs are elevated triglycerides low HDL and elevated triglycerides and glucose looking like prediabetic syndrome.  I do think that ACE inhibitor's are a good choice but diuretic such as hydrochlorothiazide can be added and if he maxes out on that perhaps perhaps choosing a calcium channel blocker would be reasonable.  No other testing is needed from a cardiac standpoint.    Today's clinic visit entailed:  Review of the result(s) of each unique test - ekg, echocardiogram  The following tests were independently interpreted by me as noted in my documentation: ekg, echocardiogram  Prescription drug management  30 minutes spent on the date of the encounter doing chart review, review of test results, interpretation of tests, patient visit and documentation   Provider  Link to [facility] Help Grid     The level of medical decision making during this visit was of  moderate complexity.      No orders of the defined types were placed in this encounter.    Orders Placed This Encounter   Medications     gabapentin (NEURONTIN) 100 MG capsule     Sig: Take 100 mg by mouth 3 times daily     There are no discontinued medications.      Encounter Diagnoses   Name Primary?     Chest pain      Dizziness      HTN (hypertension)        CURRENT MEDICATIONS:  Current Outpatient Medications   Medication Sig Dispense Refill     allopurinol (ZYLOPRIM) 300 MG tablet Take 1 tablet (300 mg) by mouth daily 90 tablet 3     buPROPion (WELLBUTRIN XL) 300 MG 24 hr tablet TAKE 1 TABLET EVERY MORNING (DISCONTINUE 150 MG) 90 tablet 3     fenofibrate (TRIGLIDE/LOFIBRA) 160 MG tablet TAKE 1 TABLET DAILY 90 tablet 0     gabapentin (NEURONTIN) 100 MG capsule Take 100 mg by mouth 3 times daily       lisinopril (ZESTRIL) 20 MG tablet Take 1 tablet (20 mg) by mouth daily 90 tablet 1     multivitamin w/minerals (THERA-VIT-M) tablet Take 1 tablet by mouth daily       Omega-3 Fatty Acids (OMEGA-3 FISH OIL PO) Take 1 capsule by mouth daily       polyethylene glycol (MIRALAX) 17 GM/Dose powder Take 17 g (1 capful) by mouth daily 510 g 1     prochlorperazine (COMPAZINE) 10 MG tablet Take 1 tablet (10 mg) by mouth every 8 hours as needed for nausea or other (headache) 12 tablet 0     VITAMIN D, CHOLECALCIFEROL, PO Take by mouth daily       hydrochlorothiazide (HYDRODIURIL) 12.5 MG tablet Take 1 tablet (12.5 mg) by mouth daily (Patient not taking: Reported on 11/9/2022) 30 tablet 0       ALLERGIES     Allergies   Allergen Reactions     No Known Drug Allergies        PAST MEDICAL HISTORY:  Past Medical History:   Diagnosis Date     Ankle joint pain 11/9/2012     Basal cell carcinoma      Blood in semen      CARDIOVASCULAR SCREENING; LDL GOAL LESS THAN 160 6/20/2011     Depressive disorder      Elevated blood pressure reading without diagnosis of hypertension      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     Gout       Hepatic steatosis 3/14/2014     Hypertension      Hypertriglyceridaemia      Hypertriglyceridemia 2/8/2013     Obesity 2/8/2013     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     Raynaud disease      Raynaud's syndrome 3/14/2014       PAST SURGICAL HISTORY:  Past Surgical History:   Procedure Laterality Date     ARTHROSCOPY KNEE Right 6/15/2016    Procedure: ARTHROSCOPY KNEE;  Surgeon: Tim Valdez MD;  [facility]     ARTHROTOMY WITH FRESH OSTEOCHONDRAL ALLOGRAFT KNEE Right 6/15/2016    Procedure: ARTHROTOMY WITH FRESH OSTEOCHONDRAL ALLOGRAFT KNEE;  Surgeon: Tim Valdez MD;  [facility]     BACK SURGERY      C5-6 arthroplasty, replaced herniated disc     COLONOSCOPY N/A 8/19/2015    Procedure: COLONOSCOPY;  Surgeon: Timbo Greenberg MD;  Location:  GI     HEMORRHOIDECTOMY EXTERNAL N/A 3/19/2019    Procedure: External hemorrhoidectomy;  Surgeon: Patria Roberson MD;  Location: [facility] OR     ORTHOPEDIC SURGERY       OSTEOTOMY TIBIA Right 6/15/2016    Procedure: OSTEOTOMY TIBIA;  Surgeon: Tim Valdez MD;  [facility]     REMOVE HARDWARE KNEE Right 6/15/2016    Procedure: REMOVE HARDWARE KNEE;  Surgeon: Tim Valdez MD;  [facility]     ZZC NONSPECIFIC PROCEDURE  1998    10 surgeries total on both knees. 2 for R knee, 3 L knee, ACL and meniscus arthroscopic procedures x 3, 2 open surgery       FAMILY HISTORY:  Family History   Problem Relation Age of Onset     Family History Negative Other         neg for RA, IBD, psoriasis, does not know much about his father side of his family, neg for gout     Attention Deficit Disorder Other      Arthritis Mother         back DJD     Alcohol/Drug Mother      Depression Mother      Endocrine Disease Mother      Anxiety Disorder Mother      Back Pain Mother      Arthritis Brother         back DJD     Heart Disease Brother         MI at 40     Hypertension Brother      Bipolar Disorder Brother      Arthritis Maternal Grandmother         back DJD      "Circulatory Maternal Grandmother      Eye Disorder Maternal Grandmother      Back Pain Maternal Grandmother      Cancer Maternal Aunt         ?type     Hypertension Brother      Circulatory Maternal Grandfather      Eye Disorder Maternal Grandfather      Hypertension Other         maternal grandparents     Depression Other      Alcohol/Drug Brother      Back Pain Brother      Heart Disease Father      Heart Disease Paternal Grandmother        SOCIAL HISTORY:  Social History     Socioeconomic History     Marital status:      Spouse name: None     Number of children: 1     Years of education: None     Highest education level: None   Occupational History     Occupation: IT work     Employer: [employer]     Employer: [employer]   Tobacco Use     Smoking status: Never     Smokeless tobacco: Never     Tobacco comments:     never smoked   Substance and Sexual Activity     Alcohol use: Yes     Alcohol/week: 0.0 - 1.7 standard drinks     Comment:  rarely (up to 4 beers a month)     Drug use: No     Sexual activity: Yes     Partners: Female     Comment: vasectomy       Review of Systems:  Skin:        Eyes:       ENT:       Respiratory:  Negative    Cardiovascular:    Positive for;lightheadedness;dizziness;palpitations  Gastroenterology:      Genitourinary:       Musculoskeletal:       Neurologic:       Psychiatric:       Heme/Lymph/Imm:  Negative    Endocrine:         Physical Exam:  Vitals: BP (!) 161/102   Pulse 78   Ht 1.83 m (6' 0.05\")   Wt 122.4 kg (269 lb 14.4 oz)   BMI 36.55 kg/m      Constitutional:           Skin:             Head:           Eyes:           Lymph:      ENT:           Neck:           Respiratory:            Cardiac:                                                           GI:           Extremities and Muscular Skeletal:                 Neurological:           Psych:         Recent Lab Results:  LIPID RESULTS:  Lab Results   Component Value Date    CHOL 164 09/08/2022    CHOL 193 07/02/2020    " HDL 30 (L) 09/08/2022    HDL 27 (L) 07/02/2020    LDL 84 09/08/2022     (H) 07/02/2020    TRIG 252 (H) 09/08/2022    TRIG 176 (H) 07/02/2020    CHOLHDLRATIO 6.1 (H) 06/13/2014       LIVER ENZYME RESULTS:  Lab Results   Component Value Date    AST 48 (H) 10/27/2022    AST 33 12/09/2020    ALT 79 (H) 10/27/2022    ALT 65 12/09/2020       CBC RESULTS:  Lab Results   Component Value Date    WBC 7.5 10/27/2022    WBC 7.9 12/09/2020    RBC 5.03 10/27/2022    RBC 4.25 (L) 12/09/2020    HGB 15.6 10/27/2022    HGB 13.4 12/09/2020    HCT 45.5 10/27/2022    HCT 38.8 (L) 12/09/2020    MCV 91 10/27/2022    MCV 91 12/09/2020    MCH 31.0 10/27/2022    MCH 31.5 12/09/2020    MCHC 34.3 10/27/2022    MCHC 34.5 12/09/2020    RDW 12.4 10/27/2022    RDW 12.5 12/09/2020     10/27/2022     12/09/2020       BMP RESULTS:  Lab Results   Component Value Date     10/27/2022     12/09/2020    POTASSIUM 3.9 10/27/2022    POTASSIUM 3.5 12/09/2020    CHLORIDE 101 10/27/2022    CHLORIDE 106 12/09/2020    CO2 31 10/27/2022    CO2 31 12/09/2020    ANIONGAP 3 10/27/2022    ANIONGAP 3 12/09/2020     (H) 10/27/2022    GLC 97 12/09/2020    BUN 16 10/27/2022    BUN 15 12/09/2020    CR 1.06 10/27/2022    CR 1.20 12/09/2020    GFRESTIMATED 89 10/27/2022    GFRESTIMATED 74 12/09/2020    GFRESTBLACK 86 12/09/2020    UMA 10.3 (H) 10/27/2022    UMA 10.5 (H) 12/09/2020        A1C RESULTS:  Lab Results   Component Value Date    A1C 5.2 05/04/2022    A1C 5.4 07/02/2020       INR RESULTS:  No results found for: INR        CC  Julianne Baron MD  [facility]  [address]  [place] [address]    Thank you for allowing me to participate in the care of your patient.      Sincerely,   BETHANY CHAVES MD   [facility]

## 2022-11-09 NOTE — PROGRESS NOTES
HPI and Plan:     Denny Herrera is a pleasant 42-year-old referred here for abnormal echocardiogram.  Ever since COVID he has had difficulties with hypertension and frequent headaches.  He was deemed long hauler with COVID and referred to neurology.  Work-up for his hypertension include an echocardiogram which is abnormal and was reviewed showing moderate left ventricular hypertrophy with preserved LV and RV size and function.  Also mild dilatation of the aortic root.  Blood pressures have been quite labile.  He was told to stop taking his hydrochlorothiazide by emergency room physician because of this is only used to treat elderly people with edema.    EKG was reviewed which was normal.    Echocardiograms and EKG are consistent with hypertension.  This underscores the importance of him treating hypertension we talked about diet exercise weight loss.  Also his abnormal labs are elevated triglycerides low HDL and elevated triglycerides and glucose looking like prediabetic syndrome.  I do think that ACE inhibitor's are a good choice but diuretic such as hydrochlorothiazide can be added and if he maxes out on that perhaps perhaps choosing a calcium channel blocker would be reasonable.  No other testing is needed from a cardiac standpoint.    Today's clinic visit entailed:  Review of the result(s) of each unique test - ekg, echocardiogram  The following tests were independently interpreted by me as noted in my documentation: ekg, echocardiogram  Prescription drug management  30 minutes spent on the date of the encounter doing chart review, review of test results, interpretation of tests, patient visit and documentation   Provider  Link to [facility] Help Grid     The level of medical decision making during this visit was of moderate complexity.      No orders of the defined types were placed in this encounter.    Orders Placed This Encounter   Medications     gabapentin (NEURONTIN) 100 MG capsule     Sig: Take 100 mg by mouth 3  times daily     There are no discontinued medications.      Encounter Diagnoses   Name Primary?     Chest pain      Dizziness      HTN (hypertension)        CURRENT MEDICATIONS:  Current Outpatient Medications   Medication Sig Dispense Refill     allopurinol (ZYLOPRIM) 300 MG tablet Take 1 tablet (300 mg) by mouth daily 90 tablet 3     buPROPion (WELLBUTRIN XL) 300 MG 24 hr tablet TAKE 1 TABLET EVERY MORNING (DISCONTINUE 150 MG) 90 tablet 3     fenofibrate (TRIGLIDE/LOFIBRA) 160 MG tablet TAKE 1 TABLET DAILY 90 tablet 0     gabapentin (NEURONTIN) 100 MG capsule Take 100 mg by mouth 3 times daily       lisinopril (ZESTRIL) 20 MG tablet Take 1 tablet (20 mg) by mouth daily 90 tablet 1     multivitamin w/minerals (THERA-VIT-M) tablet Take 1 tablet by mouth daily       Omega-3 Fatty Acids (OMEGA-3 FISH OIL PO) Take 1 capsule by mouth daily       polyethylene glycol (MIRALAX) 17 GM/Dose powder Take 17 g (1 capful) by mouth daily 510 g 1     prochlorperazine (COMPAZINE) 10 MG tablet Take 1 tablet (10 mg) by mouth every 8 hours as needed for nausea or other (headache) 12 tablet 0     VITAMIN D, CHOLECALCIFEROL, PO Take by mouth daily       hydrochlorothiazide (HYDRODIURIL) 12.5 MG tablet Take 1 tablet (12.5 mg) by mouth daily (Patient not taking: Reported on 11/9/2022) 30 tablet 0       ALLERGIES     Allergies   Allergen Reactions     No Known Drug Allergies        PAST MEDICAL HISTORY:  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     Raynaud disease      Raynaud's syndrome 3/14/2014        PAST SURGICAL HISTORY:  Past Surgical History:   Procedure Laterality Date     ARTHROSCOPY KNEE Right 6/15/2016    Procedure: ARTHROSCOPY KNEE;  Surgeon: Tim Valdez MD;  [facility]     ARTHROTOMY WITH FRESH OSTEOCHONDRAL ALLOGRAFT KNEE Right 6/15/2016    Procedure: ARTHROTOMY WITH FRESH OSTEOCHONDRAL ALLOGRAFT KNEE;  Surgeon: Tim Valdez MD;  [facility]     BACK SURGERY      C5-6 arthroplasty, replaced herniated disc     COLONOSCOPY N/A 8/19/2015    Procedure: COLONOSCOPY;  Surgeon: Timbo Greenberg MD;  [facility] GI     HEMORRHOIDECTOMY EXTERNAL N/A 3/19/2019    Procedure: External hemorrhoidectomy;  Surgeon: Patria Roberson MD;  [facility] OR     ORTHOPEDIC SURGERY       OSTEOTOMY TIBIA Right 6/15/2016    Procedure: OSTEOTOMY TIBIA;  Surgeon: Tim Valdez MD;  [facility]     REMOVE HARDWARE KNEE Right 6/15/2016    Procedure: REMOVE HARDWARE KNEE;  Surgeon: Tim Valdez MD;  [facility]     ZZC NONSPECIFIC PROCEDURE  1998    10 surgeries total on both knees. 2 for R knee, 3 L knee, ACL and meniscus arthroscopic procedures x 3, 2 open surgery       FAMILY HISTORY:  Family History   Problem Relation Age of Onset     Family History Negative Other         neg for RA, IBD, psoriasis, does not know much about his father side of his family, neg for gout     Attention Deficit Disorder Other      Arthritis Mother         back DJD     Alcohol/Drug Mother      Depression Mother      Endocrine Disease Mother      Anxiety Disorder Mother      Back Pain Mother      Arthritis Brother         back DJD     Heart Disease Brother         MI at 40     Hypertension Brother      Bipolar Disorder Brother      Arthritis Maternal Grandmother         back DJD     Circulatory Maternal Grandmother      Eye Disorder Maternal Grandmother      Back Pain Maternal Grandmother      Cancer Maternal Aunt         ?type     Hypertension Brother      Circulatory Maternal  "Grandfather      Eye Disorder Maternal Grandfather      Hypertension Other         maternal grandparents     Depression Other      Alcohol/Drug Brother      Back Pain Brother      Heart Disease Father      Heart Disease Paternal Grandmother        SOCIAL HISTORY:  Social History     Socioeconomic History     Marital status:      Spouse name: None     Number of children: 1     Years of education: None     Highest education level: None   Occupational History     Occupation: IT work     Employer: [employer]     Employer: [employer]   Tobacco Use     Smoking status: Never     Smokeless tobacco: Never     Tobacco comments:     never smoked   Substance and Sexual Activity     Alcohol use: Yes     Alcohol/week: 0.0 - 1.7 standard drinks     Comment:  rarely (up to 4 beers a month)     Drug use: No     Sexual activity: Yes     Partners: Female     Comment: vasectomy       Review of Systems:  Skin:        Eyes:       ENT:       Respiratory:  Negative    Cardiovascular:    Positive for;lightheadedness;dizziness;palpitations  Gastroenterology:      Genitourinary:       Musculoskeletal:       Neurologic:       Psychiatric:       Heme/Lymph/Imm:  Negative    Endocrine:         Physical Exam:  Vitals: BP (!) 161/102   Pulse 78   Ht 1.83 m (6' 0.05\")   Wt 122.4 kg (269 lb 14.4 oz)   BMI 36.55 kg/m      Constitutional:           Skin:             Head:           Eyes:           Lymph:      ENT:           Neck:           Respiratory:            Cardiac:                                                           GI:           Extremities and Muscular Skeletal:                 Neurological:           Psych:         Recent Lab Results:  LIPID RESULTS:  Lab Results   Component Value Date    CHOL 164 09/08/2022    CHOL 193 07/02/2020    HDL 30 (L) 09/08/2022    HDL 27 (L) 07/02/2020    LDL 84 09/08/2022     (H) 07/02/2020    TRIG 252 (H) 09/08/2022    TRIG 176 (H) 07/02/2020    CHOLHDLRATIO 6.1 (H) 06/13/2014       LIVER ENZYME " RESULTS:  Lab Results   Component Value Date    AST 48 (H) 10/27/2022    AST 33 12/09/2020    ALT 79 (H) 10/27/2022    ALT 65 12/09/2020       CBC RESULTS:  Lab Results   Component Value Date    WBC 7.5 10/27/2022    WBC 7.9 12/09/2020    RBC 5.03 10/27/2022    RBC 4.25 (L) 12/09/2020    HGB 15.6 10/27/2022    HGB 13.4 12/09/2020    HCT 45.5 10/27/2022    HCT 38.8 (L) 12/09/2020    MCV 91 10/27/2022    MCV 91 12/09/2020    MCH 31.0 10/27/2022    MCH 31.5 12/09/2020    MCHC 34.3 10/27/2022    MCHC 34.5 12/09/2020    RDW 12.4 10/27/2022    RDW 12.5 12/09/2020     10/27/2022     12/09/2020       BMP RESULTS:  Lab Results   Component Value Date     10/27/2022     12/09/2020    POTASSIUM 3.9 10/27/2022    POTASSIUM 3.5 12/09/2020    CHLORIDE 101 10/27/2022    CHLORIDE 106 12/09/2020    CO2 31 10/27/2022    CO2 31 12/09/2020    ANIONGAP 3 10/27/2022    ANIONGAP 3 12/09/2020     (H) 10/27/2022    GLC 97 12/09/2020    BUN 16 10/27/2022    BUN 15 12/09/2020    CR 1.06 10/27/2022    CR 1.20 12/09/2020    GFRESTIMATED 89 10/27/2022    GFRESTIMATED 74 12/09/2020    GFRESTBLACK 86 12/09/2020    UMA 10.3 (H) 10/27/2022    UMA 10.5 (H) 12/09/2020        A1C RESULTS:  Lab Results   Component Value Date    A1C 5.2 05/04/2022    A1C 5.4 07/02/2020       INR RESULTS:  No results found for: INR        CC  Julianne Baron MD  [facility] OF NEUROLOGY  [address]  [place] [address]

## 2022-11-20 ENCOUNTER — HEALTH MAINTENANCE LETTER (OUTPATIENT)
Age: 42
End: 2022-11-20

## 2022-12-01 ENCOUNTER — HOSPITAL ENCOUNTER (OUTPATIENT)
Dept: OCCUPATIONAL THERAPY | Facility: CLINIC | Age: 42
Discharge: HOME | End: 2022-12-01
Payer: COMMERCIAL

## 2022-12-01 DIAGNOSIS — U09.9 POST-COVID CHRONIC CONCENTRATION DEFICIT: Primary | ICD-10-CM

## 2022-12-01 DIAGNOSIS — G93.32 POST-COVID CHRONIC FATIGUE: ICD-10-CM

## 2022-12-01 DIAGNOSIS — Z78.9 IMPAIRED INSTRUMENTAL ACTIVITIES OF DAILY LIVING (IADL): ICD-10-CM

## 2022-12-01 DIAGNOSIS — R41.840 POST-COVID CHRONIC CONCENTRATION DEFICIT: Primary | ICD-10-CM

## 2022-12-01 DIAGNOSIS — U09.9 POST-COVID CHRONIC FATIGUE: ICD-10-CM

## 2022-12-01 PROCEDURE — 97537 COMMUNITY/WORK REINTEGRATION: CPT | Mod: GO | Performed by: OCCUPATIONAL THERAPIST

## 2022-12-16 ENCOUNTER — HOSPITAL ENCOUNTER (OUTPATIENT)
Dept: ULTRASOUND IMAGING | Facility: CLINIC | Age: 42
Discharge: HOME | End: 2022-12-16
Attending: PSYCHIATRY & NEUROLOGY | Admitting: PSYCHIATRY & NEUROLOGY
Payer: COMMERCIAL

## 2022-12-16 DIAGNOSIS — G44.201 INTRACTABLE TENSION-TYPE HEADACHE, UNSPECIFIED CHRONICITY PATTERN: ICD-10-CM

## 2022-12-16 PROCEDURE — 93882 EXTRACRANIAL UNI/LTD STUDY: CPT

## 2022-12-27 DIAGNOSIS — E78.1 HYPERTRIGLYCERIDEMIA: ICD-10-CM

## 2022-12-28 RX ORDER — FENOFIBRATE 160 MG/1
160 TABLET ORAL DAILY
Qty: 90 TABLET | Refills: 1 | Status: SHIPPED | OUTPATIENT
Start: 2022-12-28 | End: 2023-06-26

## 2023-04-19 DIAGNOSIS — F90.0 ATTENTION DEFICIT HYPERACTIVITY DISORDER (ADHD), PREDOMINANTLY INATTENTIVE TYPE: ICD-10-CM

## 2023-04-19 DIAGNOSIS — F32.0 MILD MAJOR DEPRESSION (H): ICD-10-CM

## 2023-04-19 NOTE — TELEPHONE ENCOUNTER
Medication Question or Refill        What medication are you calling about (include dose and sig)?: buPROPion (WELLBUTRIN XL) 300 MG 24 hr tablet       Preferred Pharmacy:  [facility] [place] [facility] - [address] [address]  Phone: [phone] Fax: [phone]        Controlled Substance Agreement on file:   CSA -- Patient Level:    CSA: None found at the patient level.       Who prescribed the medication?: Lou    Do you need a refill? Yes    When did you use the medication last? unknown    Patient offered an appointment? Yes: pt will schedule appt on [employer]    Do you have any questions or concerns?  No      Could we send this information to you in [employer] or would you prefer to receive a phone call?:   Patient would prefer a phone call   Okay to leave a detailed message?: Yes at Cell number on file:    Telephone Information:   Mobile [phone]     Ashleigh VALDEZ    [facility]

## 2023-04-20 RX ORDER — BUPROPION HYDROCHLORIDE 300 MG/1
TABLET ORAL
Qty: 90 TABLET | Refills: 0 | Status: SHIPPED | OUTPATIENT
Start: 2023-04-20

## 2023-05-22 ENCOUNTER — TRANSFERRED RECORDS (OUTPATIENT)
Dept: HEALTH INFORMATION MANAGEMENT | Facility: CLINIC | Age: 43
End: 2023-05-22
Payer: COMMERCIAL

## 2023-06-02 ENCOUNTER — HEALTH MAINTENANCE LETTER (OUTPATIENT)
Age: 43
End: 2023-06-02

## 2023-06-26 DIAGNOSIS — E78.1 HYPERTRIGLYCERIDEMIA: ICD-10-CM

## 2023-06-26 RX ORDER — FENOFIBRATE 160 MG/1
TABLET ORAL
Qty: 90 TABLET | Refills: 0 | Status: SHIPPED | OUTPATIENT
Start: 2023-06-26 | End: 2023-09-25

## 2023-06-28 ENCOUNTER — TRANSFERRED RECORDS (OUTPATIENT)
Dept: HEALTH INFORMATION MANAGEMENT | Facility: CLINIC | Age: 43
End: 2023-06-28
Payer: COMMERCIAL

## 2023-07-04 DIAGNOSIS — M10.9 GOUT, UNSPECIFIED CAUSE, UNSPECIFIED CHRONICITY, UNSPECIFIED SITE: ICD-10-CM

## 2023-07-05 RX ORDER — ALLOPURINOL 300 MG/1
TABLET ORAL
Qty: 90 TABLET | Refills: 0 | Status: SHIPPED | OUTPATIENT
Start: 2023-07-05 | End: 2023-10-03

## 2023-07-07 NOTE — TELEPHONE ENCOUNTER
Spoke with Pt he is going to schedule in karri Menchaca, Medical Assistant  [facility]

## 2023-08-02 DIAGNOSIS — I10 BENIGN ESSENTIAL HYPERTENSION: ICD-10-CM

## 2023-08-02 RX ORDER — LISINOPRIL 20 MG/1
TABLET ORAL
Qty: 30 TABLET | Refills: 1 | Status: SHIPPED | OUTPATIENT
Start: 2023-08-02 | End: 2023-10-03

## 2023-08-02 NOTE — LETTER
August 15, 2023      Denny Herrera  [address] [address]          Dear Mr. Herrera,    Our records indicate that it is time to schedule a visit with your primary care provider.  You are due to be seen for a follow-up of medications.  We have sent to the pharmacy a 1 month refill of your medication until you can be seen by your provider.  You may call [phone] to schedule or via [employer] using the appointment tab.  If you are no longer a [facility] patient; please contact us and let us know that as well.  You will need to let the pharmacy know the name of your new provider so that they can send future refill requests to them.    Sincerely,    [facility] [place]

## 2023-08-10 NOTE — TELEPHONE ENCOUNTER
Left message to call back  Luis Menchaca, Medical Assistant  [facility]

## 2023-09-25 DIAGNOSIS — E78.1 HYPERTRIGLYCERIDEMIA: ICD-10-CM

## 2023-09-25 RX ORDER — FENOFIBRATE 160 MG/1
TABLET ORAL
Qty: 90 TABLET | Refills: 0 | Status: SHIPPED | OUTPATIENT
Start: 2023-09-25

## 2023-10-02 DIAGNOSIS — I10 BENIGN ESSENTIAL HYPERTENSION: ICD-10-CM

## 2023-10-03 DIAGNOSIS — M10.9 GOUT, UNSPECIFIED CAUSE, UNSPECIFIED CHRONICITY, UNSPECIFIED SITE: ICD-10-CM

## 2023-10-03 RX ORDER — ALLOPURINOL 300 MG/1
TABLET ORAL
Qty: 90 TABLET | Refills: 0 | Status: SHIPPED | OUTPATIENT
Start: 2023-10-03 | End: 2024-01-02

## 2023-10-03 RX ORDER — LISINOPRIL 20 MG/1
TABLET ORAL
Qty: 30 TABLET | Refills: 1 | Status: SHIPPED | OUTPATIENT
Start: 2023-10-03 | End: 2023-11-30

## 2023-10-03 NOTE — TELEPHONE ENCOUNTER
Left message on voicemail for patient to call back. Hudson BAUER CMA  Pt is due for a annual physical.

## 2023-10-11 NOTE — TELEPHONE ENCOUNTER
[employer] message sent  Luis eMnchaca, Medical Assistant  [facility]

## 2023-10-20 NOTE — TELEPHONE ENCOUNTER
Pending Prescriptions:                       Disp   Refills    allopurinol (ZYLOPRIM) 300 MG tablet      135 ta*3            Sig: TAKE 1 TABLET BY MOUTH EVERY DAY AND 1/2 TABLET           BY MOUTH EVERY EVENING    fenofibrate (TRIGLIDE/LOFIBRA) 160 MG tab*90 tab*0            Sig: TAKE 1 TABLET DAILY    lisinopril (ZESTRIL) 10 MG tablet         90 tab*2            Sig: Take 1 tablet (10 mg) by mouth daily    Okayed to leave a detailed message if needed.    Afia Ayala on 4/9/2020 at 12:03 PM     9

## 2023-11-25 ENCOUNTER — HEALTH MAINTENANCE LETTER (OUTPATIENT)
Age: 43
End: 2023-11-25

## 2023-11-30 DIAGNOSIS — I10 BENIGN ESSENTIAL HYPERTENSION: ICD-10-CM

## 2023-11-30 RX ORDER — LISINOPRIL 20 MG/1
TABLET ORAL
Qty: 30 TABLET | Refills: 1 | Status: SHIPPED | OUTPATIENT
Start: 2023-11-30

## 2023-12-25 DIAGNOSIS — E78.1 HYPERTRIGLYCERIDEMIA: ICD-10-CM

## 2023-12-25 NOTE — LETTER
January 2, 2024      Denny Herrera  [address] [address]          Dear Mr. Herrera,    Our records indicate that it is time to schedule a visit with your primary care provider.  You are due to be seen for a follow-up of medications.  We have sent to the pharmacy a 1 month refill of your medication until you can be seen by your provider.  You may call [phone] to schedule or via [employer] using the appointment tab.  If you are no longer a [facility] patient; please contact us and let us know that as well.  You will need to let the pharmacy know the name of your new provider so that they can send future refill requests to them.    Sincerely,    [facility] [place]

## 2023-12-26 RX ORDER — FENOFIBRATE 160 MG/1
TABLET ORAL
Qty: 90 TABLET | Refills: 3 | OUTPATIENT
Start: 2023-12-26

## 2024-01-01 DIAGNOSIS — M10.9 GOUT, UNSPECIFIED CAUSE, UNSPECIFIED CHRONICITY, UNSPECIFIED SITE: ICD-10-CM

## 2024-01-02 RX ORDER — ALLOPURINOL 300 MG/1
TABLET ORAL
Qty: 30 TABLET | Refills: 0 | Status: SHIPPED | OUTPATIENT
Start: 2024-01-02

## 2024-01-02 NOTE — TELEPHONE ENCOUNTER
Problem: POSTPARTUM  Goal: Experiences normal postpartum course  Description: INTERVENTIONS:  - Monitor maternal vital signs  - Assess uterine involution and lochia  2023 by Cindy Tatum RN  Outcome: Completed  2023 by Cindy Tatum RN  Outcome: Progressing  Goal: Appropriate maternal -  bonding  Description: INTERVENTIONS:  - Identify family support  - Assess for appropriate maternal/infant bonding   -Encourage maternal/infant bonding opportunities  - Referral to  or  as needed  2023 by Cindy Tatum RN  Outcome: Completed  2023 by Cindy Tatum RN  Outcome: Progressing  Goal: Establishment of infant feeding pattern  Description: INTERVENTIONS:  - Assess breast/bottle feeding  - Refer to lactation as needed  2023 by Cindy Tatum RN  Outcome: Completed  2023 by Cindy Tatum RN  Outcome: Progressing  Goal: Incision(s), wounds(s) or drain site(s) healing without S/S of infection  Description: INTERVENTIONS  - Assess and document dressing, incision, wound bed, drain sites and surrounding tissue  - Provide patient and family education  2023 by Cindy Tatum RN  Outcome: Completed  2023 by Cindy Tatum RN  Outcome: Progressing     Problem: PAIN - ADULT  Goal: Verbalizes/displays adequate comfort level or baseline comfort level  Description: Interventions:  - Encourage patient to monitor pain and request assistance  - Assess pain using appropriate pain scale  - Administer analgesics based on type and severity of pain and evaluate response  - Implement non-pharmacological measures as appropriate and evaluate response  - Consider cultural and social influences on pain and pain management  - Notify physician/advanced practitioner if interventions unsuccessful or patient reports new pain  2023 by Cindy Tatum RN  Outcome: Completed  2023 by Cindy Tatum  Need to be seen for refills   RN  Outcome: Progressing     Problem: INFECTION - ADULT  Goal: Absence or prevention of progression during hospitalization  Description: INTERVENTIONS:  - Assess and monitor for signs and symptoms of infection  - Monitor lab/diagnostic results  - Monitor all insertion sites, i.e. indwelling lines, tubes, and drains  - Monitor endotracheal if appropriate and nasal secretions for changes in amount and color  - [place] appropriate cooling/warming therapies per order  - Administer medications as ordered  - Instruct and encourage patient and family to use good hand hygiene technique  - Identify and instruct in appropriate isolation precautions for identified infection/condition  12/22/2023 1120 by Cindy Tatum RN  Outcome: Completed  12/22/2023 1113 by Cindy Tatum RN  Outcome: Progressing  Goal: Absence of fever/infection during neutropenic period  Description: INTERVENTIONS:  - Monitor WBC    12/22/2023 1120 by Cindy Tatum RN  Outcome: Completed  12/22/2023 1113 by Cindy Tatum RN  Outcome: Progressing     Problem: SAFETY ADULT  Goal: Patient will remain free of falls  Description: INTERVENTIONS:  - Educate patient/family on patient safety including physical limitations  - Instruct patient to call for assistance with activity   - Consult OT/PT to assist with strengthening/mobility   - Keep Call bell within reach  - Keep bed low and locked with side rails adjusted as appropriate  - Keep care items and personal belongings within reach  - Initiate and maintain comfort rounds  - Make Fall Risk Sign visible to staff  - Apply yellow socks and bracelet for high fall risk patients  - Consider moving patient to room near nurses station  12/22/2023 1120 by Cindy Tatum RN  Outcome: Completed  12/22/2023 1113 by Cindy Tatum RN  Outcome: Progressing  Goal: Maintain or return to baseline ADL function  Description: INTERVENTIONS:  -  Assess patient's ability to carry out ADLs; assess patient's baseline for ADL  function and identify physical deficits which impact ability to perform ADLs (bathing, care of mouth/teeth, toileting, grooming, dressing, etc.)  - Assess/evaluate cause of self-care deficits   - Assess range of motion  - Assess patient's mobility; develop plan if impaired  - Assess patient's need for assistive devices and provide as appropriate  - Encourage maximum independence but intervene and supervise when necessary  - Involve family in performance of ADLs  - Assess for home care needs following discharge   - Consider OT consult to assist with ADL evaluation and planning for discharge  - Provide patient education as appropriate  12/22/2023 1120 by Cindy Tatum RN  Outcome: Completed  12/22/2023 1113 by Cindy Tatum RN  Outcome: Progressing  Goal: Maintains/Returns to pre admission functional level  Description: INTERVENTIONS:  - Perform AM-PAC 6 Click Basic Mobility/ Daily Activity assessment daily.  - Set and communicate daily mobility goal to care team and patient/family/caregiver.   - Collaborate with rehabilitation services on mobility goals if consulted  - Out of bed for toileting  - Record patient progress and toleration of activity level   12/22/2023 1120 by Cindy Tatum RN  Outcome: Completed  12/22/2023 1113 by Cindy Tatum RN  Outcome: Progressing     Problem: Knowledge Deficit  Goal: Patient/family/caregiver demonstrates understanding of disease process, treatment plan, medications, and discharge instructions  Description: Complete learning assessment and assess knowledge base.  Interventions:  - Provide teaching at level of understanding  - Provide teaching via preferred learning methods  12/22/2023 1120 by Cindy Tatum RN  Outcome: Completed  12/22/2023 1113 by Cindy Tatum RN  Outcome: Progressing     Problem: DISCHARGE PLANNING  Goal: Discharge to home or other facility with appropriate resources  Description: INTERVENTIONS:  - Identify barriers to discharge w/patient and  caregiver  - Arrange for needed discharge resources and transportation as appropriate  - Identify discharge learning needs (meds, wound care, etc.)  - Arrange for interpretive services to assist at discharge as needed  - Refer to Case Management Department for coordinating discharge planning if the patient needs post-hospital services based on physician/advanced practitioner order or complex needs related to functional status, cognitive ability, or social support system  12/22/2023 1120 by Cindy Tatum, RN  Outcome: Completed  12/22/2023 1113 by Cindy Tatum, RN  Outcome: Progressing

## 2024-01-29 DIAGNOSIS — I10 BENIGN ESSENTIAL HYPERTENSION: ICD-10-CM

## 2024-01-29 NOTE — LETTER
February 7, 2024      Denny Herrera  [address] [id number]          Dear Mr. Herrera,    Our records indicate that it is time to schedule a visit with your primary care provider.  You are due to be seen for a follow-up of medications.  You may call [phone] to schedule or via [employer] using the appointment tab.  If you are no longer a [facility] patient; please contact us and let us know that as well.  You will need to let the pharmacy know the name of your new provider so that they can send future refill requests to them.    Sincerely,    [facility] - [facility] [place]

## 2024-01-30 RX ORDER — LISINOPRIL 20 MG/1
TABLET ORAL
Qty: 30 TABLET | Refills: 11 | OUTPATIENT
Start: 2024-01-30

## 2024-06-10 NOTE — TELEPHONE ENCOUNTER
ALREADY FILLED FOR A 90 DAY RX.   
Change of pharmacy.  Prescription approved per [facility] Refill Protocol.  \Trinity Rowley RN  Message handled by Nurse Triage.    
Patient requests 90 day RX  
No

## 2024-06-22 ENCOUNTER — HEALTH MAINTENANCE LETTER (OUTPATIENT)
Age: 44
End: 2024-06-22

## 2024-07-14 ENCOUNTER — HOSPITAL ENCOUNTER (EMERGENCY)
Facility: CLINIC | Age: 44
Discharge: HOME | End: 2024-07-14
Attending: EMERGENCY MEDICINE | Admitting: EMERGENCY MEDICINE
Payer: COMMERCIAL

## 2024-07-14 ENCOUNTER — APPOINTMENT (OUTPATIENT)
Dept: CT IMAGING | Facility: CLINIC | Age: 44
End: 2024-07-14
Attending: EMERGENCY MEDICINE
Payer: COMMERCIAL

## 2024-07-14 ENCOUNTER — NURSE TRIAGE (OUTPATIENT)
Dept: NURSING | Facility: CLINIC | Age: 44
End: 2024-07-14
Payer: COMMERCIAL

## 2024-07-14 VITALS
OXYGEN SATURATION: 96 % | DIASTOLIC BLOOD PRESSURE: 75 MMHG | SYSTOLIC BLOOD PRESSURE: 135 MMHG | TEMPERATURE: 98 F | BODY MASS INDEX: 37.25 KG/M2 | WEIGHT: 275 LBS | HEART RATE: 68 BPM | HEIGHT: 72 IN | RESPIRATION RATE: 18 BRPM

## 2024-07-14 DIAGNOSIS — R10.13 ABDOMINAL PAIN, EPIGASTRIC: ICD-10-CM

## 2024-07-14 LAB
ALBUMIN SERPL BCG-MCNC: 5.1 G/DL (ref 3.5–5.2)
ALP SERPL-CCNC: 43 U/L (ref 40–150)
ALT SERPL W P-5'-P-CCNC: 130 U/L (ref 0–70)
ANION GAP SERPL CALCULATED.3IONS-SCNC: 12 MMOL/L (ref 7–15)
AST SERPL W P-5'-P-CCNC: 97 U/L (ref 0–45)
BASOPHILS # BLD AUTO: 0.1 10E3/UL (ref 0–0.2)
BASOPHILS NFR BLD AUTO: 1 %
BILIRUB SERPL-MCNC: 0.8 MG/DL
BUN SERPL-MCNC: 13.8 MG/DL (ref 6–20)
CALCIUM SERPL-MCNC: 11.9 MG/DL (ref 8.6–10)
CHLORIDE SERPL-SCNC: 100 MMOL/L (ref 98–107)
CREAT SERPL-MCNC: 1.18 MG/DL (ref 0.67–1.17)
DEPRECATED HCO3 PLAS-SCNC: 27 MMOL/L (ref 22–29)
EGFRCR SERPLBLD CKD-EPI 2021: 77 ML/MIN/1.73M2
EOSINOPHIL # BLD AUTO: 0.2 10E3/UL (ref 0–0.7)
EOSINOPHIL NFR BLD AUTO: 2 %
ERYTHROCYTE [DISTWIDTH] IN BLOOD BY AUTOMATED COUNT: 12.7 % (ref 10–15)
GLUCOSE SERPL-MCNC: 127 MG/DL (ref 70–99)
HCT VFR BLD AUTO: 44.2 % (ref 40–53)
HGB BLD-MCNC: 15.4 G/DL (ref 13.3–17.7)
HOLD SPECIMEN: NORMAL
IMM GRANULOCYTES # BLD: 0 10E3/UL
IMM GRANULOCYTES NFR BLD: 0 %
LIPASE SERPL-CCNC: 45 U/L (ref 13–60)
LYMPHOCYTES # BLD AUTO: 2.1 10E3/UL (ref 0.8–5.3)
LYMPHOCYTES NFR BLD AUTO: 30 %
MCH RBC QN AUTO: 31.8 PG (ref 26.5–33)
MCHC RBC AUTO-ENTMCNC: 34.8 G/DL (ref 31.5–36.5)
MCV RBC AUTO: 91 FL (ref 78–100)
MONOCYTES # BLD AUTO: 0.6 10E3/UL (ref 0–1.3)
MONOCYTES NFR BLD AUTO: 9 %
NEUTROPHILS # BLD AUTO: 4.1 10E3/UL (ref 1.6–8.3)
NEUTROPHILS NFR BLD AUTO: 58 %
NRBC # BLD AUTO: 0 10E3/UL
NRBC BLD AUTO-RTO: 0 /100
PLATELET # BLD AUTO: 283 10E3/UL (ref 150–450)
POTASSIUM SERPL-SCNC: 3.9 MMOL/L (ref 3.4–5.3)
PROT SERPL-MCNC: 8.7 G/DL (ref 6.4–8.3)
RBC # BLD AUTO: 4.84 10E6/UL (ref 4.4–5.9)
SODIUM SERPL-SCNC: 139 MMOL/L (ref 135–145)
WBC # BLD AUTO: 7.1 10E3/UL (ref 4–11)

## 2024-07-14 PROCEDURE — 250N000013 HC RX MED GY IP 250 OP 250 PS 637: Performed by: EMERGENCY MEDICINE

## 2024-07-14 PROCEDURE — 80053 COMPREHEN METABOLIC PANEL: CPT | Performed by: EMERGENCY MEDICINE

## 2024-07-14 PROCEDURE — 74177 CT ABD & PELVIS W/CONTRAST: CPT

## 2024-07-14 PROCEDURE — 258N000003 HC RX IP 258 OP 636: Performed by: EMERGENCY MEDICINE

## 2024-07-14 PROCEDURE — 85025 COMPLETE CBC W/AUTO DIFF WBC: CPT | Performed by: EMERGENCY MEDICINE

## 2024-07-14 PROCEDURE — 250N000011 HC RX IP 250 OP 636: Performed by: EMERGENCY MEDICINE

## 2024-07-14 PROCEDURE — 99285 EMERGENCY DEPT VISIT HI MDM: CPT | Mod: 25

## 2024-07-14 PROCEDURE — 36415 COLL VENOUS BLD VENIPUNCTURE: CPT | Performed by: EMERGENCY MEDICINE

## 2024-07-14 PROCEDURE — 96360 HYDRATION IV INFUSION INIT: CPT | Mod: 59

## 2024-07-14 PROCEDURE — 250N000009 HC RX 250: Performed by: EMERGENCY MEDICINE

## 2024-07-14 PROCEDURE — 83690 ASSAY OF LIPASE: CPT | Performed by: EMERGENCY MEDICINE

## 2024-07-14 RX ORDER — IOPAMIDOL 755 MG/ML
135 INJECTION, SOLUTION INTRAVASCULAR ONCE
Status: COMPLETED | OUTPATIENT
Start: 2024-07-14 | End: 2024-07-14

## 2024-07-14 RX ORDER — MAGNESIUM HYDROXIDE/ALUMINUM HYDROXICE/SIMETHICONE 120; 1200; 1200 MG/30ML; MG/30ML; MG/30ML
15 SUSPENSION ORAL ONCE
Status: COMPLETED | OUTPATIENT
Start: 2024-07-14 | End: 2024-07-14

## 2024-07-14 RX ORDER — LIDOCAINE HYDROCHLORIDE 20 MG/ML
10 SOLUTION OROPHARYNGEAL ONCE
Status: COMPLETED | OUTPATIENT
Start: 2024-07-14 | End: 2024-07-14

## 2024-07-14 RX ORDER — ONDANSETRON 4 MG/1
4 TABLET, ORALLY DISINTEGRATING ORAL EVERY 6 HOURS PRN
Qty: 8 TABLET | Refills: 0 | Status: SHIPPED | OUTPATIENT
Start: 2024-07-14

## 2024-07-14 RX ADMIN — IOPAMIDOL 135 ML: 755 INJECTION, SOLUTION INTRAVENOUS at 16:47

## 2024-07-14 RX ADMIN — SODIUM CHLORIDE 79 ML: 9 INJECTION, SOLUTION INTRAVENOUS at 16:47

## 2024-07-14 RX ADMIN — LIDOCAINE HYDROCHLORIDE 10 ML: 20 SOLUTION ORAL at 17:58

## 2024-07-14 RX ADMIN — SODIUM CHLORIDE 1000 ML: 9 INJECTION, SOLUTION INTRAVENOUS at 16:34

## 2024-07-14 RX ADMIN — ALUMINUM HYDROXIDE, MAGNESIUM HYDROXIDE, AND SIMETHICONE 15 ML: 200; 200; 20 SUSPENSION ORAL at 17:58

## 2024-07-14 ASSESSMENT — ACTIVITIES OF DAILY LIVING (ADL)
ADLS_ACUITY_SCORE: 36

## 2024-07-14 NOTE — TELEPHONE ENCOUNTER
"Nurse Triage SBAR    Is this a 2nd Level Triage? YES, LICENSED PRACTITIONER REVIEW IS REQUIRED    Situation: Abdominal pain, nausea     Background: Has had some abdominal pain and nausea since yesterday with increase burping. Abdominal pain is in the middle of his stomach right above his belly button. Abdominal pain started yesterday, pain rating 6/10 and continuous. Eating makes pain worse. Has tried tums and gas-x without relief.     Assessment: Abdominal pain is in the middle of his stomach right above his belly button. Abdominal pain started yesterday, pain rating 6/10 and continuous. Eating makes pain worse. Has tried tums and gas-x without relief. Reports some upper back and neck pain. Denies vomiting, bloody stools, chest pain, difficulty breathing or fever.     Protocol Recommended Disposition:   Go to ED Now, See More Appropriate Guideline, See HCP Within 4 Hours (Or PCP Triage)    Recommendation: Go to ED now     Reason for Disposition   [1] MILD-MODERATE pain AND [2] constant AND [3] present > 2 hours   Pain is mainly in upper abdomen  (if needed ask: \"is it mainly above the belly button?\")   [1] Pain lasts > 10 minutes AND [2] age > 40 AND [3] associated chest, arm, neck, upper back or jaw pain    Additional Information   Negative: Shock suspected (e.g., cold/pale/clammy skin, too weak to stand, low BP, rapid pulse)   Negative: Difficult to awaken or acting confused (e.g., disoriented, slurred speech)   Negative: Passed out (i.e., lost consciousness, collapsed and was not responding)   Negative: Sounds like a life-threatening emergency to the triager   Negative: [1] SEVERE pain (e.g., excruciating) AND [2] present > 1 hour   Negative: [1] SEVERE pain AND [2] age > 60 years   Negative: [1] Vomiting AND [2] contains red blood or black (\"coffee ground\") material  (Exception: Few red streaks in vomit that only happened once.)   Negative: Blood in bowel movements  (Exception: Blood on surface of BM with " constipation.)   Negative: Black or tarry bowel movements  (Exception: Chronic-unchanged black-grey BMs AND is taking iron pills or Pepto-Bismol.)   Negative: [1] Unable to urinate (or only a few drops) > 4 hours AND [2] bladder feels very full (e.g., palpable bladder or strong urge to urinate)   Negative: [1] Vomiting AND [2] contains bile (green color)   Negative: [1] Pain in the scrotum or testicle AND [2] present > 1 hour   Negative: Patient sounds very sick or weak to the triager   Negative: SEVERE difficulty breathing (e.g., struggling for each breath, speaks in single words)   Negative: Shock suspected (e.g., cold/pale/clammy skin, too weak to stand, low BP, rapid pulse)   Negative: Difficult to awaken or acting confused (e.g., disoriented, slurred speech)   Negative: Passed out (i.e., lost consciousness, collapsed and was not responding)   Negative: Visible sweat on face or sweat dripping down face   Negative: Sounds like a life-threatening emergency to the triager   Negative: [1] SEVERE pain (e.g., excruciating) AND [2] present > 1 hour   Negative: [1] Pain lasts > 10 minutes AND [2] age > 50    Protocols used: Abdominal Pain - Male-A-AH, Abdominal Pain - Upper-A-AH

## 2024-07-14 NOTE — ED PROVIDER NOTES
Emergency Department Note      History of Present Illness     Chief Complaint   Abdominal Pain      HPI   Denny Herrera is a 44 year old male who presents with 3 days of mid upper abdominal pain.  It is a constant crampy pain and does not radiate.  It is from his sternum down to his umbilicus.  He has had nausea constantly but no vomiting, no diarrhea.  His appetite has been down and it actually gets worse with eating.  No fevers or chills.  He does not drink alcohol much no drugs, no history of pancreatitis or hepatitis.  He still has his gallbladder.  Does not hurt worse with breathing.  He has never had this before.  No urinary symptoms.  Currently he says his pain is the best it has been in 3 days, its about a 2 out of a 10.  He did try some Tums and an acids and it really did not help.  Ibuprofen did not help.    Independent Historian   None    Review of External Notes   I reviewed his office note from 12/12/2022    Past Medical History     Medical History and Problem List   Past Medical History:   Diagnosis Date    Ankle joint pain 11/9/2012    Basal cell carcinoma     Blood in semen     CARDIOVASCULAR SCREENING; LDL GOAL LESS THAN 160 6/20/2011    Depressive disorder     Elevated blood pressure reading without diagnosis of hypertension     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    Gout     Hepatic steatosis 3/14/2014    Hypertension     Hypertriglyceridaemia     Hypertriglyceridemia 2/8/2013    Obesity 2/8/2013    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    Raynaud disease     Raynaud's syndrome 3/14/2014       Medications   omeprazole (PRILOSEC) 20 MG DR capsule  ondansetron (ZOFRAN ODT) 4 MG ODT tab  allopurinol (ZYLOPRIM) 300 MG tablet  buPROPion (WELLBUTRIN XL) 300 MG 24 hr tablet  fenofibrate (TRIGLIDE/LOFIBRA) 160 MG tablet  gabapentin (NEURONTIN) 100 MG capsule  hydrochlorothiazide (HYDRODIURIL) 12.5 MG tablet  lisinopril (ZESTRIL) 20 MG tablet  multivitamin w/minerals (THERA-VIT-M) tablet  Omega-3 Fatty Acids (OMEGA-3  FISH OIL PO)  polyethylene glycol (MIRALAX) 17 GM/Dose powder  prochlorperazine (COMPAZINE) 10 MG tablet  VITAMIN D, CHOLECALCIFEROL, PO        Surgical History   Past Surgical History:   Procedure Laterality Date    ARTHROSCOPY KNEE Right 6/15/2016    Procedure: ARTHROSCOPY KNEE;  Surgeon: Tim Valdez MD;  Location: [facility]    ARTHROTOMY WITH FRESH OSTEOCHONDRAL ALLOGRAFT KNEE Right 6/15/2016    Procedure: ARTHROTOMY WITH FRESH OSTEOCHONDRAL ALLOGRAFT KNEE;  Surgeon: Tim Valdez MD;  Location: [facility] OR    BACK SURGERY      C5-6 arthroplasty, replaced herniated disc    COLONOSCOPY N/A 8/19/2015    Procedure: COLONOSCOPY;  Surgeon: Timbo Greenberg MD;  Location: [facility]    HEMORRHOIDECTOMY EXTERNAL N/A 3/19/2019    Procedure: External hemorrhoidectomy;  Surgeon: Patria Roberson MD;  Location: [facility] OR    ORTHOPEDIC SURGERY      OSTEOTOMY TIBIA Right 6/15/2016    Procedure: OSTEOTOMY TIBIA;  Surgeon: Tim Valdez MD;  [facility]: [facility]    REMOVE HARDWARE KNEE Right 6/15/2016    Procedure: REMOVE HARDWARE KNEE;  Surgeon: Tim Valdez MD;  Location: [facility]    ZZC NONSPECIFIC PROCEDURE  1998    10 surgeries total on both knees. 2 for R knee, 3 L knee, ACL and meniscus arthroscopic procedures x 3, 2 open surgery       Physical Exam     Patient Vitals for the past 24 hrs:   BP Temp Temp src Pulse Resp SpO2 Height Weight   07/14/24 1707 -- -- -- -- -- 96 % -- --   07/14/24 1638 135/75 -- -- 68 18 97 % -- --   07/14/24 1351 (!) 152/95 98  F (36.7  C) Oral 73 18 98 % 1.829 m (6') 124.7 kg (275 lb)     Physical Exam  Nursing note and vitals reviewed.    Constitutional:  Appears comfortable.    HENT:                Nose normal.  No discharge.      Oral mucosa is moist.  Eyes:    Conjunctivae are normal without injection.  Pupils are equal.  Cardiovascular:  Normal rate, regular rhythm with normal S1 and S2.      Normal heart sounds and peripheral pulses 2+ and equal.    Pulmonary:  Effort  normal and breath sounds clear to auscultation bilaterally.   GI:    Soft. No distension and no mass, he has some diffuse tenderness, worse though in the epigastrium down to the umbilicus.  He has a negative Onofre sign, only mild right upper quadrant tenderness.  No flank pain  Musculoskeletal:  Normal range of motion. No extremity deformity.     No edema and no tenderness.    Neurological:   Alert and oriented. No focal weakness.  Skin:    Skin is warm and dry. No rash noted.   Psychiatric:   Behavior is normal. Appropriate mood and affect.     Judgment and thought content normal.       Diagnostics     Lab Results   Labs Ordered and Resulted from Time of ED Arrival to Time of ED Departure   COMPREHENSIVE METABOLIC PANEL - Abnormal       Result Value    Sodium 139      Potassium 3.9      Carbon Dioxide (CO2) 27      Anion Gap 12      Urea Nitrogen 13.8      Creatinine 1.18 (*)     GFR Estimate 77      Calcium 11.9 (*)     Chloride 100      Glucose 127 (*)     Alkaline Phosphatase 43      AST 97 (*)      (*)     Protein Total 8.7 (*)     Albumin 5.1      Bilirubin Total 0.8     LIPASE - Normal    Lipase 45     CBC WITH PLATELETS AND DIFFERENTIAL    WBC Count 7.1      RBC Count 4.84      Hemoglobin 15.4      Hematocrit 44.2      MCV 91      MCH 31.8      MCHC 34.8      RDW 12.7      Platelet Count 283      % Neutrophils 58      % Lymphocytes 30      % Monocytes 9      % Eosinophils 2      % Basophils 1      % Immature Granulocytes 0      NRBCs per 100 WBC 0      Absolute Neutrophils 4.1      Absolute Lymphocytes 2.1      Absolute Monocytes 0.6      Absolute Eosinophils 0.2      Absolute Basophils 0.1      Absolute Immature Granulocytes 0.0      Absolute NRBCs 0.0         Imaging   CT Abdomen Pelvis w Contrast   Final Result   IMPRESSION:    1.  No acute findings or inflammatory changes in the abdomen or pelvis.      2.  Diffuse hepatic steatosis.            Independent Interpretation   None    ED Course       Medications Administered   Medications   alum & mag hydroxide-simethicone (MAALOX) suspension 15 mL (has no administration in time range)   lidocaine (viscous) (XYLOCAINE) 2 % solution 10 mL (has no administration in time range)   sodium chloride 0.9% BOLUS 1,000 mL (1,000 mLs Intravenous $New Bag 7/14/24 1634)   iopamidol (ISOVUE-370) solution 135 mL (135 mLs Intravenous $Given 7/14/24 1647)   sodium chloride 0.9 % bag 500mL for CT scan flush use (79 mLs Intravenous $Given 7/14/24 1647)       Procedures   Procedures     Discussion of Management   None    ED Course        Optional/Additional Documentation  None    Medical Decision Making / Diagnosis     CMS Diagnoses: None    MIPS       None    MDM   Denny Herrera is a 44 year old male who comes in with abdominal pain and nausea for 3 days.  His tenderness is somewhat diffuse and mostly mid to upper abdomen.  He does not have any right upper quadrant pain that significant.  Labs are obtained his white count is normal he does not have a fever.  However his AST and ALT are elevated and he does not drink.  I have ordered a CT scan of the abdomen and pelvis.  CT came back normal other than a fatty liver which could explain his AST and ALT elevations.  He was given a GI cocktail which might developed a little bit.  This is possible that he has an ulcer that causes the postprandial pain.  His gallbladder and biliary tract look completely normal on CT.  I feel comfortable discharging him home but if not improving over the next few days, he needs to see Dr. Porter for upper GI endoscopy and consultation.  I will put the patient on a proton pump inhibitor.  Zofran is prescribed he was instructed to return if he gets worse.    Take the omeprazole twice a day, you could try liquid antacid during the day as needed.  If not improving over the next 1 to 2 days, contact Dr. Porter to schedule upper GI endoscopy or at least a consultation appointment.  If you get worse with  fevers, vomiting, worsening pain, return to the ER.  Take the Zofran as needed for nausea and make sure you are staying hydrated and eat a bland diet.    Disposition   The patient was discharged.     Diagnosis     ICD-10-CM    1. Abdominal pain, epigastric  R10.13            Discharge Medications   New Prescriptions    OMEPRAZOLE (PRILOSEC) 20 MG DR CAPSULE    Take 1 capsule (20 mg) by mouth 2 times daily    ONDANSETRON (ZOFRAN ODT) 4 MG ODT TAB    Take 1 tablet (4 mg) by mouth every 6 hours as needed for nausea         MD Miguel Valdivia, Rossy Jones MD  07/14/24 0050

## 2024-07-14 NOTE — DISCHARGE INSTRUCTIONS
Take the omeprazole twice a day, you could try liquid antacid during the day as needed.  If not improving over the next 1 to 2 days, contact Dr. Porter to schedule upper GI endoscopy or at least a consultation appointment.  If you get worse with fevers, vomiting, worsening pain, return to the ER.  Take the Zofran as needed for nausea and make sure you are staying hydrated and eat a bland diet.

## 2024-10-24 ASSESSMENT — PATIENT HEALTH QUESTIONNAIRE - PHQ9: SUM OF ALL RESPONSES TO PHQ QUESTIONS 1-9: 15

## 2024-10-24 ASSESSMENT — ANXIETY QUESTIONNAIRES: GAD7 TOTAL SCORE: 4

## 2025-01-04 ENCOUNTER — HEALTH MAINTENANCE LETTER (OUTPATIENT)
Age: 45
End: 2025-01-04

## 2025-01-06 ENCOUNTER — NURSE TRIAGE (OUTPATIENT)
Dept: FAMILY MEDICINE | Facility: CLINIC | Age: 45
End: 2025-01-06
Payer: COMMERCIAL

## 2025-01-06 NOTE — TELEPHONE ENCOUNTER
"Pt called the clinic concerned about pain in his right testicle.     Pain started yesterday. Pain is constat. Pain is 6/10. Pain occurs when walking or when he touches it or going to the bathroom. Pt is urinating more frequently.     Pt denies abdomen pain, difficulty passing urine, fever, vomiting.     Advised pt to go to ED now. Pt stated he will go to the ER in [place].        1. SCROTAL SWELLING: \"What does the scrotum look like?\" \"How swollen is it?\" (mild, moderate severe; compare to other side)      Right testicle is swollen, and tender (to the touch and walking around going to the bathroom)    2. LOCATION: \"Where is the swelling located?\"      Right testicle   3. ONSET: \"When did the swelling start?\"      Yesterday   4. PATTERN: \"Does it come and go, or has it been constant since it started?\"      Constant   5. SCROTAL PAIN: \"Is there any pain?\" If Yes, ask: \"How bad is it?\"  (Scale 1-10; or mild, moderate, severe)      6/10  6. HERNIA: \"Has a doctor ever told you that you have a hernia?\"      No  7. OTHER SYMPTOMS: \"Do you have any other symptoms?\" (e.g., abdomen pain, difficulty passing urine, fever, vomiting)      Urinating more frequently   Reason for Disposition   Scrotum is painful or tender to touch    Additional Information   Negative: Rash or color change of scrotum BUT no swelling or pain   Negative: Inguinal hernia previously diagnosed by a doctor (or NP/PA)   Negative: Swelling followed a genital area injury (e.g., penis, scrotum)   Negative: Pain in scrotum is main symptom    Protocols used: Scrotum Swelling-A-OH    "

## 2025-07-12 ENCOUNTER — HEALTH MAINTENANCE LETTER (OUTPATIENT)
Age: 45
End: 2025-07-12

## 2025-07-20 ENCOUNTER — PATIENT OUTREACH (OUTPATIENT)
Dept: CARE COORDINATION | Facility: CLINIC | Age: 45
End: 2025-07-20
Payer: COMMERCIAL

## (undated) DEVICE — PREP SKIN SCRUB TRAY 4461A

## (undated) DEVICE — BAG CLEAR TRASH 1.3M 39X33" P4040C

## (undated) DEVICE — GLOVE PROTEXIS POWDER FREE 9.0 ORTHOPEDIC 2D73ET90

## (undated) DEVICE — SPONGE SURGIFOAM 01GM POWDER 1978

## (undated) DEVICE — SPONGE RAY-TEC 4X8" 7318

## (undated) DEVICE — TUBING SUCTION 12"X1/4" N612

## (undated) DEVICE — DRAPE SHEET REV FOLD 3/4 9349

## (undated) DEVICE — DRSG BANDAID 1X3" FABRIC CURITY LATEX FREE KC44101

## (undated) DEVICE — PREP POVIDONE IODINE SOLUTION 10% 4OZ

## (undated) DEVICE — LINEN HALF SHEET 5512

## (undated) DEVICE — PIN DISTRACTION ANCHOR FOR SCR 14MM MDS9091414

## (undated) DEVICE — NDL ANGIOCATH 14GA 2" 4088

## (undated) DEVICE — SU VICRYL 3-0 SH 27" J316H

## (undated) DEVICE — TAPE CLOTH ADHESIVE 3" LATEX FREE

## (undated) DEVICE — DRAPE LAP PEDS DISP 29492

## (undated) DEVICE — IMM COLLAR CERVICAL MED UNIVERSAL 3X24" 79-83500

## (undated) DEVICE — SUCTION TIP YANKAUER W/O VENT K86

## (undated) DEVICE — ESU GROUND PAD UNIVERSAL W/O CORD

## (undated) DEVICE — GLOVE PROTEXIS W/NEU-THERA 7.0  2D73TE70

## (undated) DEVICE — PANTIES MESH LG/XLG 2PK 706M2

## (undated) DEVICE — GLOVE PROTEXIS BLUE W/NEU-THERA 7.5  2D73EB75

## (undated) DEVICE — LINEN TOWEL PACK X10 5473

## (undated) DEVICE — SU PROLENE 5-0 P-2 18" 8686G

## (undated) DEVICE — DRSG STERI STRIP 1/2X4" R1547

## (undated) DEVICE — BLADE CLIPPER 4412A

## (undated) DEVICE — NDL SPINAL 18GA 3.5" 405184

## (undated) DEVICE — LINEN TOWEL PACK X5 5464

## (undated) DEVICE — BUR ROUND 5MM CARBIDE XLONG 5093-230

## (undated) DEVICE — LINEN FULL SHEET 5511

## (undated) DEVICE — SYR BULB IRRIG 50ML LATEX FREE 0035280

## (undated) DEVICE — DRSG ABDOMINAL 07 1/2X8" 7197D

## (undated) DEVICE — PACK MINOR CUSTOM RIDGES SBA32RMRMA

## (undated) DEVICE — DRAPE STERI TOWEL SM 1000

## (undated) DEVICE — SUCTION MANIFOLD NEPTUNE SGL

## (undated) DEVICE — SPONGE KITTNER 31001010

## (undated) DEVICE — SYR 20ML LL W/O NDL 302830

## (undated) DEVICE — SOL WATER IRRIG 1000ML BOTTLE 2F7114

## (undated) DEVICE — SUCTION CANISTER MEDIVAC LINER 3000ML W/LID 65651-530

## (undated) DEVICE — DRSG ADAPTIC 3X3" 6112

## (undated) DEVICE — ESU GROUND PAD ADULT W/CORD E7507

## (undated) DEVICE — SU VICRYL 4-0 PS-2 18" UND J496H

## (undated) DEVICE — PACK SPINE SM CUSTOM SNE15SSFSK

## (undated) RX ORDER — FENTANYL CITRATE 50 UG/ML
INJECTION, SOLUTION INTRAMUSCULAR; INTRAVENOUS
Status: DISPENSED
Start: 2019-03-19

## (undated) RX ORDER — BUPIVACAINE HYDROCHLORIDE AND EPINEPHRINE 5; 5 MG/ML; UG/ML
INJECTION, SOLUTION EPIDURAL; INTRACAUDAL; PERINEURAL
Status: DISPENSED
Start: 2019-03-19

## (undated) RX ORDER — ACETAMINOPHEN 325 MG/1
TABLET ORAL
Status: DISPENSED
Start: 2019-03-19